# Patient Record
Sex: FEMALE | Race: BLACK OR AFRICAN AMERICAN | NOT HISPANIC OR LATINO
[De-identification: names, ages, dates, MRNs, and addresses within clinical notes are randomized per-mention and may not be internally consistent; named-entity substitution may affect disease eponyms.]

---

## 2017-01-05 ENCOUNTER — APPOINTMENT (OUTPATIENT)
Dept: OBGYN | Facility: HOSPITAL | Age: 27
End: 2017-01-05

## 2017-01-05 ENCOUNTER — OUTPATIENT (OUTPATIENT)
Dept: OUTPATIENT SERVICES | Facility: HOSPITAL | Age: 27
LOS: 1 days | End: 2017-01-05

## 2017-01-05 VITALS
HEIGHT: 63 IN | SYSTOLIC BLOOD PRESSURE: 100 MMHG | WEIGHT: 212.4 LBS | DIASTOLIC BLOOD PRESSURE: 82 MMHG | BODY MASS INDEX: 37.63 KG/M2

## 2017-01-05 DIAGNOSIS — O03.9 COMPLETE OR UNSPECIFIED SPONTANEOUS ABORTION WITHOUT COMPLICATION: Chronic | ICD-10-CM

## 2017-01-05 DIAGNOSIS — Z98.89 OTHER SPECIFIED POSTPROCEDURAL STATES: Chronic | ICD-10-CM

## 2017-01-05 DIAGNOSIS — Z34.83 ENCOUNTER FOR SUPERVISION OF OTHER NORMAL PREGNANCY, THIRD TRIMESTER: ICD-10-CM

## 2017-01-08 ENCOUNTER — OUTPATIENT (OUTPATIENT)
Dept: OUTPATIENT SERVICES | Facility: HOSPITAL | Age: 27
LOS: 1 days | End: 2017-01-08
Payer: MEDICAID

## 2017-01-08 DIAGNOSIS — Z98.89 OTHER SPECIFIED POSTPROCEDURAL STATES: Chronic | ICD-10-CM

## 2017-01-08 DIAGNOSIS — O03.9 COMPLETE OR UNSPECIFIED SPONTANEOUS ABORTION WITHOUT COMPLICATION: Chronic | ICD-10-CM

## 2017-01-08 DIAGNOSIS — O26.90 PREGNANCY RELATED CONDITIONS, UNSPECIFIED, UNSPECIFIED TRIMESTER: ICD-10-CM

## 2017-01-08 PROCEDURE — 99214 OFFICE O/P EST MOD 30 MIN: CPT

## 2017-01-12 ENCOUNTER — APPOINTMENT (OUTPATIENT)
Dept: OBGYN | Facility: HOSPITAL | Age: 27
End: 2017-01-12

## 2017-01-12 ENCOUNTER — OUTPATIENT (OUTPATIENT)
Dept: OUTPATIENT SERVICES | Facility: HOSPITAL | Age: 27
LOS: 1 days | End: 2017-01-12

## 2017-01-12 VITALS
DIASTOLIC BLOOD PRESSURE: 80 MMHG | BODY MASS INDEX: 37.92 KG/M2 | WEIGHT: 214 LBS | SYSTOLIC BLOOD PRESSURE: 116 MMHG | HEIGHT: 63 IN

## 2017-01-12 DIAGNOSIS — Z98.89 OTHER SPECIFIED POSTPROCEDURAL STATES: Chronic | ICD-10-CM

## 2017-01-12 DIAGNOSIS — O03.9 COMPLETE OR UNSPECIFIED SPONTANEOUS ABORTION WITHOUT COMPLICATION: Chronic | ICD-10-CM

## 2017-01-13 DIAGNOSIS — Z34.83 ENCOUNTER FOR SUPERVISION OF OTHER NORMAL PREGNANCY, THIRD TRIMESTER: ICD-10-CM

## 2017-01-19 ENCOUNTER — APPOINTMENT (OUTPATIENT)
Dept: OBGYN | Facility: HOSPITAL | Age: 27
End: 2017-01-19

## 2017-01-19 ENCOUNTER — OUTPATIENT (OUTPATIENT)
Dept: OUTPATIENT SERVICES | Facility: HOSPITAL | Age: 27
LOS: 1 days | End: 2017-01-19

## 2017-01-19 VITALS — WEIGHT: 210 LBS | SYSTOLIC BLOOD PRESSURE: 120 MMHG | DIASTOLIC BLOOD PRESSURE: 79 MMHG | BODY MASS INDEX: 37.2 KG/M2

## 2017-01-19 DIAGNOSIS — Z98.89 OTHER SPECIFIED POSTPROCEDURAL STATES: Chronic | ICD-10-CM

## 2017-01-19 DIAGNOSIS — Z34.83 ENCOUNTER FOR SUPERVISION OF OTHER NORMAL PREGNANCY, THIRD TRIMESTER: ICD-10-CM

## 2017-01-19 DIAGNOSIS — O03.9 COMPLETE OR UNSPECIFIED SPONTANEOUS ABORTION WITHOUT COMPLICATION: Chronic | ICD-10-CM

## 2017-01-24 ENCOUNTER — INPATIENT (INPATIENT)
Facility: HOSPITAL | Age: 27
LOS: 2 days | Discharge: HOME | End: 2017-01-27
Attending: OBSTETRICS & GYNECOLOGY | Admitting: OBSTETRICS & GYNECOLOGY
Payer: MEDICAID

## 2017-01-24 ENCOUNTER — APPOINTMENT (OUTPATIENT)
Dept: OBGYN | Facility: HOSPITAL | Age: 27
End: 2017-01-24

## 2017-01-24 ENCOUNTER — OTHER (OUTPATIENT)
Age: 27
End: 2017-01-24

## 2017-01-24 ENCOUNTER — OUTPATIENT (OUTPATIENT)
Dept: OUTPATIENT SERVICES | Facility: HOSPITAL | Age: 27
LOS: 1 days | End: 2017-01-24

## 2017-01-24 ENCOUNTER — RESULT REVIEW (OUTPATIENT)
Age: 27
End: 2017-01-24

## 2017-01-24 VITALS — SYSTOLIC BLOOD PRESSURE: 136 MMHG | DIASTOLIC BLOOD PRESSURE: 94 MMHG | BODY MASS INDEX: 38.26 KG/M2 | WEIGHT: 216 LBS

## 2017-01-24 VITALS — WEIGHT: 213.85 LBS | HEIGHT: 63 IN

## 2017-01-24 DIAGNOSIS — Z98.89 OTHER SPECIFIED POSTPROCEDURAL STATES: Chronic | ICD-10-CM

## 2017-01-24 DIAGNOSIS — O03.9 COMPLETE OR UNSPECIFIED SPONTANEOUS ABORTION WITHOUT COMPLICATION: Chronic | ICD-10-CM

## 2017-01-24 DIAGNOSIS — O26.90 PREGNANCY RELATED CONDITIONS, UNSPECIFIED, UNSPECIFIED TRIMESTER: ICD-10-CM

## 2017-01-24 LAB
ALBUMIN SERPL ELPH-MCNC: 3.6 G/DL — SIGNIFICANT CHANGE UP (ref 3.3–5)
ALP SERPL-CCNC: 235 U/L — HIGH (ref 40–120)
ALT FLD-CCNC: 10 U/L — SIGNIFICANT CHANGE UP (ref 4–33)
APPEARANCE UR: SIGNIFICANT CHANGE UP
APTT BLD: 31 SEC — SIGNIFICANT CHANGE UP (ref 27.5–37.4)
AST SERPL-CCNC: 16 U/L — SIGNIFICANT CHANGE UP (ref 4–32)
BACTERIA # UR AUTO: SIGNIFICANT CHANGE UP
BASOPHILS # BLD AUTO: 0.05 K/UL — SIGNIFICANT CHANGE UP (ref 0–0.2)
BASOPHILS NFR BLD AUTO: 0.9 % — SIGNIFICANT CHANGE UP (ref 0–2)
BILIRUB SERPL-MCNC: 0.2 MG/DL — SIGNIFICANT CHANGE UP (ref 0.2–1.2)
BILIRUB UR-MCNC: NEGATIVE — SIGNIFICANT CHANGE UP
BLD GP AB SCN SERPL QL: NEGATIVE — SIGNIFICANT CHANGE UP
BLOOD UR QL VISUAL: HIGH
BUN SERPL-MCNC: 9 MG/DL — SIGNIFICANT CHANGE UP (ref 7–23)
CALCIUM SERPL-MCNC: 9.3 MG/DL — SIGNIFICANT CHANGE UP (ref 8.4–10.5)
CHLORIDE SERPL-SCNC: 101 MMOL/L — SIGNIFICANT CHANGE UP (ref 98–107)
CO2 SERPL-SCNC: 21 MMOL/L — LOW (ref 22–31)
COLOR SPEC: SIGNIFICANT CHANGE UP
CREAT SERPL-MCNC: 0.45 MG/DL — LOW (ref 0.5–1.3)
EOSINOPHIL # BLD AUTO: 0.16 K/UL — SIGNIFICANT CHANGE UP (ref 0–0.5)
EOSINOPHIL NFR BLD AUTO: 3 % — SIGNIFICANT CHANGE UP (ref 0–6)
FIBRINOGEN PPP-MCNC: 662 MG/DL — HIGH (ref 255–510)
GLUCOSE SERPL-MCNC: 84 MG/DL — SIGNIFICANT CHANGE UP (ref 70–99)
GLUCOSE UR-MCNC: NEGATIVE — SIGNIFICANT CHANGE UP
HCT VFR BLD CALC: 36.9 % — SIGNIFICANT CHANGE UP (ref 34.5–45)
HGB BLD-MCNC: 12 G/DL — SIGNIFICANT CHANGE UP (ref 11.5–15.5)
IMM GRANULOCYTES NFR BLD AUTO: 0.6 % — SIGNIFICANT CHANGE UP (ref 0–1.5)
INR BLD: 0.95 — SIGNIFICANT CHANGE UP (ref 0.87–1.18)
KETONES UR-MCNC: NEGATIVE — SIGNIFICANT CHANGE UP
LDH SERPL L TO P-CCNC: 185 U/L — SIGNIFICANT CHANGE UP (ref 135–225)
LEUKOCYTE ESTERASE UR-ACNC: HIGH
LYMPHOCYTES # BLD AUTO: 1.84 K/UL — SIGNIFICANT CHANGE UP (ref 1–3.3)
LYMPHOCYTES # BLD AUTO: 34.1 % — SIGNIFICANT CHANGE UP (ref 13–44)
MAGNESIUM SERPL-MCNC: 4.4 MG/DL — HIGH (ref 1.6–2.6)
MCHC RBC-ENTMCNC: 27.7 PG — SIGNIFICANT CHANGE UP (ref 27–34)
MCHC RBC-ENTMCNC: 32.5 % — SIGNIFICANT CHANGE UP (ref 32–36)
MCV RBC AUTO: 85.2 FL — SIGNIFICANT CHANGE UP (ref 80–100)
MONOCYTES # BLD AUTO: 0.83 K/UL — SIGNIFICANT CHANGE UP (ref 0–0.9)
MONOCYTES NFR BLD AUTO: 15.4 % — HIGH (ref 2–14)
MUCOUS THREADS # UR AUTO: SIGNIFICANT CHANGE UP
NEUTROPHILS # BLD AUTO: 2.49 K/UL — SIGNIFICANT CHANGE UP (ref 1.8–7.4)
NEUTROPHILS NFR BLD AUTO: 46 % — SIGNIFICANT CHANGE UP (ref 43–77)
NITRITE UR-MCNC: NEGATIVE — SIGNIFICANT CHANGE UP
PH UR: 7 — SIGNIFICANT CHANGE UP (ref 4.6–8)
PLATELET # BLD AUTO: 239 K/UL — SIGNIFICANT CHANGE UP (ref 150–400)
PMV BLD: 12.4 FL — SIGNIFICANT CHANGE UP (ref 7–13)
POTASSIUM SERPL-MCNC: 4.5 MMOL/L — SIGNIFICANT CHANGE UP (ref 3.5–5.3)
POTASSIUM SERPL-SCNC: 4.5 MMOL/L — SIGNIFICANT CHANGE UP (ref 3.5–5.3)
PROT SERPL-MCNC: 7.2 G/DL — SIGNIFICANT CHANGE UP (ref 6–8.3)
PROT UR-MCNC: 10 — SIGNIFICANT CHANGE UP
PROTHROM AB SERPL-ACNC: 10.8 SEC — SIGNIFICANT CHANGE UP (ref 10–13.1)
RBC # BLD: 4.33 M/UL — SIGNIFICANT CHANGE UP (ref 3.8–5.2)
RBC # FLD: 14.8 % — HIGH (ref 10.3–14.5)
RBC CASTS # UR COMP ASSIST: SIGNIFICANT CHANGE UP (ref 0–?)
RH IG SCN BLD-IMP: POSITIVE — SIGNIFICANT CHANGE UP
SODIUM SERPL-SCNC: 139 MMOL/L — SIGNIFICANT CHANGE UP (ref 135–145)
SP GR SPEC: 1.02 — SIGNIFICANT CHANGE UP (ref 1–1.03)
SQUAMOUS # UR AUTO: SIGNIFICANT CHANGE UP
T PALLIDUM AB TITR SER: NEGATIVE — SIGNIFICANT CHANGE UP
URATE SERPL-MCNC: 5.5 MG/DL — SIGNIFICANT CHANGE UP (ref 2.5–7)
UROBILINOGEN FLD QL: NORMAL E.U. — SIGNIFICANT CHANGE UP (ref 0.1–0.2)
WBC # BLD: 5.4 K/UL — SIGNIFICANT CHANGE UP (ref 3.8–10.5)
WBC # FLD AUTO: 5.4 K/UL — SIGNIFICANT CHANGE UP (ref 3.8–10.5)
WBC UR QL: SIGNIFICANT CHANGE UP (ref 0–?)

## 2017-01-24 RX ORDER — OXYTOCIN 10 UNIT/ML
2 VIAL (ML) INJECTION
Qty: 30 | Refills: 0 | Status: DISCONTINUED | OUTPATIENT
Start: 2017-01-24 | End: 2017-01-25

## 2017-01-24 RX ORDER — SODIUM CHLORIDE 9 MG/ML
1000 INJECTION, SOLUTION INTRAVENOUS ONCE
Qty: 0 | Refills: 0 | Status: COMPLETED | OUTPATIENT
Start: 2017-01-24 | End: 2017-01-24

## 2017-01-24 RX ORDER — OXYTOCIN 10 UNIT/ML
333.33 VIAL (ML) INJECTION
Qty: 20 | Refills: 0 | Status: DISCONTINUED | OUTPATIENT
Start: 2017-01-24 | End: 2017-01-25

## 2017-01-24 RX ORDER — PENICILLIN G POTASSIUM 5000000 [IU]/1
5 POWDER, FOR SOLUTION INTRAMUSCULAR; INTRAPLEURAL; INTRATHECAL; INTRAVENOUS ONCE
Qty: 0 | Refills: 0 | Status: DISCONTINUED | OUTPATIENT
Start: 2017-01-24 | End: 2017-01-24

## 2017-01-24 RX ORDER — LABETALOL HCL 100 MG
20 TABLET ORAL ONCE
Qty: 0 | Refills: 0 | Status: COMPLETED | OUTPATIENT
Start: 2017-01-24 | End: 2017-01-24

## 2017-01-24 RX ORDER — SODIUM CHLORIDE 9 MG/ML
1000 INJECTION, SOLUTION INTRAVENOUS
Qty: 0 | Refills: 0 | Status: DISCONTINUED | OUTPATIENT
Start: 2017-01-24 | End: 2017-01-25

## 2017-01-24 RX ORDER — ACETAMINOPHEN 500 MG
975 TABLET ORAL EVERY 6 HOURS
Qty: 0 | Refills: 0 | Status: DISCONTINUED | OUTPATIENT
Start: 2017-01-24 | End: 2017-01-27

## 2017-01-24 RX ORDER — MAGNESIUM SULFATE 500 MG/ML
4 VIAL (ML) INJECTION ONCE
Qty: 0 | Refills: 0 | Status: COMPLETED | OUTPATIENT
Start: 2017-01-24 | End: 2017-01-24

## 2017-01-24 RX ORDER — ACETAMINOPHEN 500 MG
975 TABLET ORAL ONCE
Qty: 0 | Refills: 0 | Status: COMPLETED | OUTPATIENT
Start: 2017-01-24 | End: 2017-01-24

## 2017-01-24 RX ORDER — MAGNESIUM SULFATE 500 MG/ML
2 VIAL (ML) INJECTION
Qty: 40 | Refills: 0 | Status: DISCONTINUED | OUTPATIENT
Start: 2017-01-24 | End: 2017-01-27

## 2017-01-24 RX ORDER — PENICILLIN G POTASSIUM 5000000 [IU]/1
2.5 POWDER, FOR SOLUTION INTRAMUSCULAR; INTRAPLEURAL; INTRATHECAL; INTRAVENOUS EVERY 4 HOURS
Qty: 0 | Refills: 0 | Status: DISCONTINUED | OUTPATIENT
Start: 2017-01-24 | End: 2017-01-24

## 2017-01-24 RX ORDER — PENICILLIN G POTASSIUM 5000000 [IU]/1
POWDER, FOR SOLUTION INTRAMUSCULAR; INTRAPLEURAL; INTRATHECAL; INTRAVENOUS
Qty: 0 | Refills: 0 | Status: DISCONTINUED | OUTPATIENT
Start: 2017-01-24 | End: 2017-01-24

## 2017-01-24 RX ADMIN — Medication 300 GRAM(S): at 11:44

## 2017-01-24 RX ADMIN — SODIUM CHLORIDE 2000 MILLILITER(S): 9 INJECTION, SOLUTION INTRAVENOUS at 14:40

## 2017-01-24 RX ADMIN — SODIUM CHLORIDE 50 MILLILITER(S): 9 INJECTION, SOLUTION INTRAVENOUS at 19:40

## 2017-01-24 RX ADMIN — Medication 2 MILLIUNIT(S)/MIN: at 17:26

## 2017-01-24 RX ADMIN — SODIUM CHLORIDE 50 MILLILITER(S): 9 INJECTION, SOLUTION INTRAVENOUS at 11:00

## 2017-01-24 RX ADMIN — Medication 50 GM/HR: at 19:10

## 2017-01-24 RX ADMIN — Medication 975 MILLIGRAM(S): at 11:12

## 2017-01-24 RX ADMIN — Medication 20 MILLIGRAM(S): at 10:45

## 2017-01-24 RX ADMIN — Medication 975 MILLIGRAM(S): at 11:47

## 2017-01-24 RX ADMIN — Medication 50 GM/HR: at 12:05

## 2017-01-24 RX ADMIN — Medication 975 MILLIGRAM(S): at 17:25

## 2017-01-25 DIAGNOSIS — Z34.82 ENCOUNTER FOR SUPERVISION OF OTHER NORMAL PREGNANCY, SECOND TRIMESTER: ICD-10-CM

## 2017-01-25 DIAGNOSIS — I10 ESSENTIAL (PRIMARY) HYPERTENSION: ICD-10-CM

## 2017-01-25 LAB
MAGNESIUM SERPL-MCNC: 5.6 MG/DL — HIGH (ref 1.6–2.6)
MAGNESIUM SERPL-MCNC: 5.7 MG/DL — HIGH (ref 1.6–2.6)
MAGNESIUM SERPL-MCNC: 5.9 MG/DL — HIGH (ref 1.6–2.6)
MAGNESIUM SERPL-MCNC: 6.1 MG/DL — HIGH (ref 1.6–2.6)

## 2017-01-25 PROCEDURE — 59409 OBSTETRICAL CARE: CPT | Mod: U9

## 2017-01-25 PROCEDURE — 88307 TISSUE EXAM BY PATHOLOGIST: CPT | Mod: 26

## 2017-01-25 RX ORDER — DIPHENHYDRAMINE HCL 50 MG
25 CAPSULE ORAL EVERY 6 HOURS
Qty: 0 | Refills: 0 | Status: DISCONTINUED | OUTPATIENT
Start: 2017-01-25 | End: 2017-01-27

## 2017-01-25 RX ORDER — GLYCERIN ADULT
1 SUPPOSITORY, RECTAL RECTAL AT BEDTIME
Qty: 0 | Refills: 0 | Status: DISCONTINUED | OUTPATIENT
Start: 2017-01-25 | End: 2017-01-27

## 2017-01-25 RX ORDER — SODIUM CHLORIDE 9 MG/ML
3 INJECTION INTRAMUSCULAR; INTRAVENOUS; SUBCUTANEOUS EVERY 8 HOURS
Qty: 0 | Refills: 0 | Status: DISCONTINUED | OUTPATIENT
Start: 2017-01-25 | End: 2017-01-27

## 2017-01-25 RX ORDER — PRAMOXINE HYDROCHLORIDE 150 MG/15G
1 AEROSOL, FOAM RECTAL EVERY 4 HOURS
Qty: 0 | Refills: 0 | Status: DISCONTINUED | OUTPATIENT
Start: 2017-01-25 | End: 2017-01-25

## 2017-01-25 RX ORDER — MAGNESIUM HYDROXIDE 400 MG/1
30 TABLET, CHEWABLE ORAL
Qty: 0 | Refills: 0 | Status: DISCONTINUED | OUTPATIENT
Start: 2017-01-25 | End: 2017-01-27

## 2017-01-25 RX ORDER — OXYTOCIN 10 UNIT/ML
41.67 VIAL (ML) INJECTION
Qty: 20 | Refills: 0 | Status: DISCONTINUED | OUTPATIENT
Start: 2017-01-25 | End: 2017-01-25

## 2017-01-25 RX ORDER — OXYCODONE HYDROCHLORIDE 5 MG/1
5 TABLET ORAL
Qty: 0 | Refills: 0 | Status: DISCONTINUED | OUTPATIENT
Start: 2017-01-25 | End: 2017-01-27

## 2017-01-25 RX ORDER — IBUPROFEN 200 MG
600 TABLET ORAL EVERY 6 HOURS
Qty: 0 | Refills: 0 | Status: DISCONTINUED | OUTPATIENT
Start: 2017-01-25 | End: 2017-01-27

## 2017-01-25 RX ORDER — SODIUM CHLORIDE 9 MG/ML
1000 INJECTION, SOLUTION INTRAVENOUS
Qty: 0 | Refills: 0 | Status: DISCONTINUED | OUTPATIENT
Start: 2017-01-25 | End: 2017-01-26

## 2017-01-25 RX ORDER — KETOROLAC TROMETHAMINE 30 MG/ML
30 SYRINGE (ML) INJECTION ONCE
Qty: 0 | Refills: 0 | Status: DISCONTINUED | OUTPATIENT
Start: 2017-01-25 | End: 2017-01-25

## 2017-01-25 RX ORDER — OXYTOCIN 10 UNIT/ML
16.67 VIAL (ML) INJECTION
Qty: 20 | Refills: 0 | Status: DISCONTINUED | OUTPATIENT
Start: 2017-01-25 | End: 2017-01-27

## 2017-01-25 RX ORDER — MAGNESIUM SULFATE 500 MG/ML
2 VIAL (ML) INJECTION
Qty: 40 | Refills: 0 | Status: DISCONTINUED | OUTPATIENT
Start: 2017-01-25 | End: 2017-01-26

## 2017-01-25 RX ORDER — DIBUCAINE 1 %
1 OINTMENT (GRAM) RECTAL EVERY 4 HOURS
Qty: 0 | Refills: 0 | Status: DISCONTINUED | OUTPATIENT
Start: 2017-01-25 | End: 2017-01-25

## 2017-01-25 RX ORDER — OXYTOCIN 10 UNIT/ML
333.33 VIAL (ML) INJECTION
Qty: 20 | Refills: 0 | Status: DISCONTINUED | OUTPATIENT
Start: 2017-01-25 | End: 2017-01-27

## 2017-01-25 RX ORDER — SODIUM CHLORIDE 9 MG/ML
3 INJECTION INTRAMUSCULAR; INTRAVENOUS; SUBCUTANEOUS EVERY 8 HOURS
Qty: 0 | Refills: 0 | Status: DISCONTINUED | OUTPATIENT
Start: 2017-01-25 | End: 2017-01-25

## 2017-01-25 RX ORDER — AER TRAVELER 0.5 G/1
1 SOLUTION RECTAL; TOPICAL EVERY 4 HOURS
Qty: 0 | Refills: 0 | Status: DISCONTINUED | OUTPATIENT
Start: 2017-01-25 | End: 2017-01-27

## 2017-01-25 RX ORDER — OXYCODONE HYDROCHLORIDE 5 MG/1
5 TABLET ORAL EVERY 4 HOURS
Qty: 0 | Refills: 0 | Status: DISCONTINUED | OUTPATIENT
Start: 2017-01-25 | End: 2017-01-27

## 2017-01-25 RX ORDER — AER TRAVELER 0.5 G/1
1 SOLUTION RECTAL; TOPICAL EVERY 4 HOURS
Qty: 0 | Refills: 0 | Status: DISCONTINUED | OUTPATIENT
Start: 2017-01-25 | End: 2017-01-25

## 2017-01-25 RX ORDER — DOCUSATE SODIUM 100 MG
100 CAPSULE ORAL
Qty: 0 | Refills: 0 | Status: DISCONTINUED | OUTPATIENT
Start: 2017-01-25 | End: 2017-01-27

## 2017-01-25 RX ORDER — OXYTOCIN 10 UNIT/ML
333.33 VIAL (ML) INJECTION
Qty: 20 | Refills: 0 | Status: COMPLETED | OUTPATIENT
Start: 2017-01-25

## 2017-01-25 RX ORDER — SIMETHICONE 80 MG/1
80 TABLET, CHEWABLE ORAL EVERY 6 HOURS
Qty: 0 | Refills: 0 | Status: DISCONTINUED | OUTPATIENT
Start: 2017-01-25 | End: 2017-01-27

## 2017-01-25 RX ORDER — HYDROCORTISONE 1 %
1 OINTMENT (GRAM) TOPICAL EVERY 4 HOURS
Qty: 0 | Refills: 0 | Status: DISCONTINUED | OUTPATIENT
Start: 2017-01-25 | End: 2017-01-27

## 2017-01-25 RX ORDER — LANOLIN
1 OINTMENT (GRAM) TOPICAL EVERY 6 HOURS
Qty: 0 | Refills: 0 | Status: DISCONTINUED | OUTPATIENT
Start: 2017-01-25 | End: 2017-01-27

## 2017-01-25 RX ORDER — PRAMOXINE HYDROCHLORIDE 150 MG/15G
1 AEROSOL, FOAM RECTAL EVERY 4 HOURS
Qty: 0 | Refills: 0 | Status: DISCONTINUED | OUTPATIENT
Start: 2017-01-25 | End: 2017-01-27

## 2017-01-25 RX ORDER — HYDROCORTISONE 1 %
1 OINTMENT (GRAM) TOPICAL EVERY 4 HOURS
Qty: 0 | Refills: 0 | Status: DISCONTINUED | OUTPATIENT
Start: 2017-01-25 | End: 2017-01-25

## 2017-01-25 RX ORDER — DIBUCAINE 1 %
1 OINTMENT (GRAM) RECTAL EVERY 4 HOURS
Qty: 0 | Refills: 0 | Status: DISCONTINUED | OUTPATIENT
Start: 2017-01-25 | End: 2017-01-27

## 2017-01-25 RX ADMIN — Medication 100 MILLIGRAM(S): at 15:18

## 2017-01-25 RX ADMIN — OXYCODONE HYDROCHLORIDE 5 MILLIGRAM(S): 5 TABLET ORAL at 19:52

## 2017-01-25 RX ADMIN — Medication 50 GM/HR: at 20:37

## 2017-01-25 RX ADMIN — OXYCODONE HYDROCHLORIDE 5 MILLIGRAM(S): 5 TABLET ORAL at 12:21

## 2017-01-25 RX ADMIN — OXYCODONE HYDROCHLORIDE 5 MILLIGRAM(S): 5 TABLET ORAL at 16:21

## 2017-01-25 RX ADMIN — Medication 600 MILLIGRAM(S): at 20:38

## 2017-01-25 RX ADMIN — Medication 600 MILLIGRAM(S): at 15:18

## 2017-01-25 RX ADMIN — OXYCODONE HYDROCHLORIDE 5 MILLIGRAM(S): 5 TABLET ORAL at 07:49

## 2017-01-25 RX ADMIN — Medication 1 TABLET(S): at 12:21

## 2017-01-25 RX ADMIN — SODIUM CHLORIDE 3 MILLILITER(S): 9 INJECTION INTRAMUSCULAR; INTRAVENOUS; SUBCUTANEOUS at 09:51

## 2017-01-25 RX ADMIN — Medication 125 MILLIUNIT(S)/MIN: at 06:15

## 2017-01-25 RX ADMIN — Medication 50 GM/HR: at 09:47

## 2017-01-25 RX ADMIN — OXYCODONE HYDROCHLORIDE 5 MILLIGRAM(S): 5 TABLET ORAL at 08:14

## 2017-01-25 RX ADMIN — Medication 600 MILLIGRAM(S): at 15:51

## 2017-01-25 RX ADMIN — OXYCODONE HYDROCHLORIDE 5 MILLIGRAM(S): 5 TABLET ORAL at 20:38

## 2017-01-25 RX ADMIN — Medication 50 MILLIUNIT(S)/MIN: at 08:24

## 2017-01-25 RX ADMIN — SODIUM CHLORIDE 50 MILLILITER(S): 9 INJECTION, SOLUTION INTRAVENOUS at 20:36

## 2017-01-25 RX ADMIN — Medication 1000 MILLIUNIT(S)/MIN: at 06:14

## 2017-01-25 RX ADMIN — Medication 50 GM/HR: at 07:15

## 2017-01-25 RX ADMIN — Medication 600 MILLIGRAM(S): at 19:52

## 2017-01-25 RX ADMIN — Medication 30 MILLIGRAM(S): at 07:50

## 2017-01-25 RX ADMIN — Medication 30 MILLIGRAM(S): at 07:20

## 2017-01-25 RX ADMIN — OXYCODONE HYDROCHLORIDE 5 MILLIGRAM(S): 5 TABLET ORAL at 15:51

## 2017-01-25 RX ADMIN — Medication 975 MILLIGRAM(S): at 10:28

## 2017-01-25 RX ADMIN — OXYCODONE HYDROCHLORIDE 5 MILLIGRAM(S): 5 TABLET ORAL at 12:51

## 2017-01-25 RX ADMIN — SODIUM CHLORIDE 3 MILLILITER(S): 9 INJECTION INTRAMUSCULAR; INTRAVENOUS; SUBCUTANEOUS at 20:39

## 2017-01-25 NOTE — PROVIDER CONTACT NOTE (OTHER) - ACTION/TREATMENT ORDERED:
MD at bedside, assessed pt., felt for uterus, and saw vaginal bleeding on pad. No new orders given, will continue to monitor status.

## 2017-01-26 ENCOUNTER — TRANSCRIPTION ENCOUNTER (OUTPATIENT)
Age: 27
End: 2017-01-26

## 2017-01-26 LAB — MAGNESIUM SERPL-MCNC: 5.7 MG/DL — HIGH (ref 1.6–2.6)

## 2017-01-26 RX ORDER — ACETAMINOPHEN 500 MG
3 TABLET ORAL
Qty: 0 | Refills: 0 | COMMUNITY
Start: 2017-01-26

## 2017-01-26 RX ORDER — IBUPROFEN 200 MG
1 TABLET ORAL
Qty: 0 | Refills: 0 | COMMUNITY
Start: 2017-01-26

## 2017-01-26 RX ADMIN — Medication 1 TABLET(S): at 13:50

## 2017-01-26 RX ADMIN — Medication 600 MILLIGRAM(S): at 13:49

## 2017-01-26 RX ADMIN — OXYCODONE HYDROCHLORIDE 5 MILLIGRAM(S): 5 TABLET ORAL at 03:19

## 2017-01-26 RX ADMIN — Medication 100 MILLIGRAM(S): at 01:07

## 2017-01-26 RX ADMIN — SODIUM CHLORIDE 3 MILLILITER(S): 9 INJECTION INTRAMUSCULAR; INTRAVENOUS; SUBCUTANEOUS at 06:17

## 2017-01-26 RX ADMIN — Medication 600 MILLIGRAM(S): at 01:06

## 2017-01-26 RX ADMIN — Medication 600 MILLIGRAM(S): at 02:00

## 2017-01-26 RX ADMIN — OXYCODONE HYDROCHLORIDE 5 MILLIGRAM(S): 5 TABLET ORAL at 04:00

## 2017-01-26 RX ADMIN — Medication 975 MILLIGRAM(S): at 01:07

## 2017-01-26 RX ADMIN — Medication 975 MILLIGRAM(S): at 13:49

## 2017-01-26 RX ADMIN — SODIUM CHLORIDE 3 MILLILITER(S): 9 INJECTION INTRAMUSCULAR; INTRAVENOUS; SUBCUTANEOUS at 13:51

## 2017-01-26 RX ADMIN — Medication 600 MILLIGRAM(S): at 14:40

## 2017-01-26 NOTE — DISCHARGE NOTE OB - PLAN OF CARE
Recovery Regular diet.  Resume normal activity as tolerated. Follow up at [facility] in 6 weeks.  No heavy lifting, driving, or strenuous activity for 6 weeks.  Nothing per vagina such as tampons, intercourse, douches or tub baths for 6 weeks or until you see your doctor.  Call your doctor with any signs and symptoms of infection such as fever, chills, nausea or vomiting.  Call your doctor if you're unable to tolerate food, increase in vaginal bleeding or have difficulty urinating.  Call your doctor if you have pain that is not relieved by your prescribed medications.  Notify your doctor with any other concerns.

## 2017-01-26 NOTE — DISCHARGE NOTE OB - CARE PROVIDERS DIRECT ADDRESSES
,DirectAddress_Unknown,[email or web address]

## 2017-01-26 NOTE — LACTATION INITIAL EVALUATION - INTERVENTION OUTCOME
verbalizes understanding/good return demonstration/demonstrates understanding of teaching/Worked with mother on deeper latch and position and  latched well and swallowing noted , mother educated on feeding cues and to breastfeed 8- 12 times in 24 hours and discussed breastfeeding log and assist prn, Rn aware of plan Worked with mother on deeper latch and position and  latched well and swallowing noted , mother educated on feeding cues and to breastfeed 8- 12 times in 24 hours and discussed breastfeeding log and assist prn, Rn aware of plan/demonstrates understanding of teaching/verbalizes understanding/good return demonstration/needs met

## 2017-01-26 NOTE — DISCHARGE NOTE OB - CARE PLAN
Principal Discharge DX:	Vaginal delivery  Goal:	Recovery  Instructions for follow-up, activity and diet:	Regular diet.  Resume normal activity as tolerated. Follow up at [facility] in 6 weeks.  No heavy lifting, driving, or strenuous activity for 6 weeks.  Nothing per vagina such as tampons, intercourse, douches or tub baths for 6 weeks or until you see your doctor.  Call your doctor with any signs and symptoms of infection such as fever, chills, nausea or vomiting.  Call your doctor if you're unable to tolerate food, increase in vaginal bleeding or have difficulty urinating.  Call your doctor if you have pain that is not relieved by your prescribed medications.  Notify your doctor with any other concerns.

## 2017-01-26 NOTE — DISCHARGE NOTE OB - HOSPITAL COURSE
Patient underwent uncomplicated  of male infant.    HCT 36.9  Course complicated by severe PEC by BP with normal HELLP labs requiring Labetolol 20 mg IVP x 1 and Magnesium 24 hour infusion postpartum.  Patient desired male circumcision which was performed.  Patient counseled on contraceptive options and desired 6 wk Paraguard PP.    The patient met all appropriate post partum milestones.  The patient was able to void, tolerated a regular diet, and ambulated on her own.  The patient was discharged on PPD#2 afebrile, with stable vital signs, and appropriate pain control.

## 2017-01-26 NOTE — DISCHARGE NOTE OB - PATIENT PORTAL LINK FT
“You can access the [facility] Patient Portal, offered by [facility], by registering with the following website: [email or web address]”

## 2017-01-26 NOTE — DISCHARGE NOTE OB - MEDICATION SUMMARY - MEDICATIONS TO TAKE
I will START or STAY ON the medications listed below when I get home from the hospital:    acetaminophen 325 mg oral tablet  -- 3 tab(s) by mouth every 6 hours  -- Indication: For Pain    ibuprofen 600 mg oral tablet  -- 1 tab(s) by mouth every 6 hours  -- Indication: For Pain    Prenatabs Rx oral tablet  -- 1 tab(s) by mouth once a day  -- Indication: For Breastfeeding

## 2017-01-27 ENCOUNTER — OTHER (OUTPATIENT)
Age: 27
End: 2017-01-27

## 2017-01-27 VITALS
TEMPERATURE: 99 F | HEART RATE: 108 BPM | OXYGEN SATURATION: 100 % | SYSTOLIC BLOOD PRESSURE: 129 MMHG | DIASTOLIC BLOOD PRESSURE: 81 MMHG | RESPIRATION RATE: 18 BRPM

## 2017-01-27 RX ADMIN — Medication 975 MILLIGRAM(S): at 09:11

## 2017-01-27 RX ADMIN — Medication 600 MILLIGRAM(S): at 09:11

## 2017-01-27 RX ADMIN — SODIUM CHLORIDE 3 MILLILITER(S): 9 INJECTION INTRAMUSCULAR; INTRAVENOUS; SUBCUTANEOUS at 00:06

## 2017-01-27 RX ADMIN — SODIUM CHLORIDE 3 MILLILITER(S): 9 INJECTION INTRAMUSCULAR; INTRAVENOUS; SUBCUTANEOUS at 05:44

## 2017-01-27 RX ADMIN — Medication 600 MILLIGRAM(S): at 10:10

## 2017-01-27 NOTE — PROVIDER CONTACT NOTE (OTHER) - ASSESSMENT
VS WNL, /81, , complains of perineum pain/soreness, fundus is 1 below, vaginal bleeding is moderate.

## 2017-01-31 ENCOUNTER — APPOINTMENT (OUTPATIENT)
Dept: OBGYN | Facility: HOSPITAL | Age: 27
End: 2017-01-31

## 2017-02-01 LAB — SURGICAL PATHOLOGY STUDY: SIGNIFICANT CHANGE UP

## 2017-03-03 ENCOUNTER — LABORATORY RESULT (OUTPATIENT)
Age: 27
End: 2017-03-03

## 2017-03-03 ENCOUNTER — OUTPATIENT (OUTPATIENT)
Dept: OUTPATIENT SERVICES | Facility: HOSPITAL | Age: 27
LOS: 1 days | End: 2017-03-03

## 2017-03-03 ENCOUNTER — APPOINTMENT (OUTPATIENT)
Dept: OBGYN | Facility: HOSPITAL | Age: 27
End: 2017-03-03

## 2017-03-03 VITALS
BODY MASS INDEX: 33.13 KG/M2 | HEART RATE: 75 BPM | DIASTOLIC BLOOD PRESSURE: 64 MMHG | WEIGHT: 187 LBS | SYSTOLIC BLOOD PRESSURE: 128 MMHG | HEIGHT: 63 IN

## 2017-03-03 DIAGNOSIS — Z98.89 OTHER SPECIFIED POSTPROCEDURAL STATES: Chronic | ICD-10-CM

## 2017-03-03 DIAGNOSIS — O03.9 COMPLETE OR UNSPECIFIED SPONTANEOUS ABORTION WITHOUT COMPLICATION: Chronic | ICD-10-CM

## 2017-03-04 LAB
C TRACH RRNA SPEC QL NAA+PROBE: SIGNIFICANT CHANGE UP
N GONORRHOEA RRNA SPEC QL NAA+PROBE: SIGNIFICANT CHANGE UP
SPECIMEN SOURCE: SIGNIFICANT CHANGE UP

## 2017-03-22 ENCOUNTER — APPOINTMENT (OUTPATIENT)
Dept: OBGYN | Facility: HOSPITAL | Age: 27
End: 2017-03-22

## 2017-03-29 ENCOUNTER — LABORATORY RESULT (OUTPATIENT)
Age: 27
End: 2017-03-29

## 2017-03-29 LAB
BASOPHILS # BLD AUTO: 0.1 K/UL — SIGNIFICANT CHANGE UP (ref 0–0.2)
BASOPHILS NFR BLD AUTO: 1.9 % — SIGNIFICANT CHANGE UP (ref 0–2)
EOSINOPHIL # BLD AUTO: 0.27 K/UL — SIGNIFICANT CHANGE UP (ref 0–0.5)
EOSINOPHIL NFR BLD AUTO: 5.1 % — SIGNIFICANT CHANGE UP (ref 0–6)
HCT VFR BLD CALC: 34.3 % — LOW (ref 34.5–45)
HGB BLD-MCNC: 10.6 G/DL — LOW (ref 11.5–15.5)
IMM GRANULOCYTES NFR BLD AUTO: 0.2 % — SIGNIFICANT CHANGE UP (ref 0–1.5)
LYMPHOCYTES # BLD AUTO: 2.04 K/UL — SIGNIFICANT CHANGE UP (ref 1–3.3)
LYMPHOCYTES # BLD AUTO: 38.3 % — SIGNIFICANT CHANGE UP (ref 13–44)
MCHC RBC-ENTMCNC: 25.8 PG — LOW (ref 27–34)
MCHC RBC-ENTMCNC: 30.9 % — LOW (ref 32–36)
MCV RBC AUTO: 83.5 FL — SIGNIFICANT CHANGE UP (ref 80–100)
MONOCYTES # BLD AUTO: 0.47 K/UL — SIGNIFICANT CHANGE UP (ref 0–0.9)
MONOCYTES NFR BLD AUTO: 8.8 % — SIGNIFICANT CHANGE UP (ref 2–14)
NEUTROPHILS # BLD AUTO: 2.43 K/UL — SIGNIFICANT CHANGE UP (ref 1.8–7.4)
NEUTROPHILS NFR BLD AUTO: 45.7 % — SIGNIFICANT CHANGE UP (ref 43–77)
PLATELET # BLD AUTO: 353 K/UL — SIGNIFICANT CHANGE UP (ref 150–400)
PMV BLD: 11.8 FL — SIGNIFICANT CHANGE UP (ref 7–13)
RBC # BLD: 4.11 M/UL — SIGNIFICANT CHANGE UP (ref 3.8–5.2)
RBC # FLD: 15.4 % — HIGH (ref 10.3–14.5)
WBC # BLD: 5.32 K/UL — SIGNIFICANT CHANGE UP (ref 3.8–10.5)
WBC # FLD AUTO: 5.32 K/UL — SIGNIFICANT CHANGE UP (ref 3.8–10.5)

## 2017-11-07 ENCOUNTER — TRANSCRIPTION ENCOUNTER (OUTPATIENT)
Age: 27
End: 2017-11-07

## 2018-04-14 ENCOUNTER — TRANSCRIPTION ENCOUNTER (OUTPATIENT)
Age: 28
End: 2018-04-14

## 2018-07-02 ENCOUNTER — EMERGENCY (EMERGENCY)
Facility: HOSPITAL | Age: 28
LOS: 1 days | Discharge: HOME | End: 2018-07-02
Attending: EMERGENCY MEDICINE | Admitting: EMERGENCY MEDICINE
Payer: COMMERCIAL

## 2018-07-02 VITALS
RESPIRATION RATE: 16 BRPM | SYSTOLIC BLOOD PRESSURE: 137 MMHG | OXYGEN SATURATION: 100 % | DIASTOLIC BLOOD PRESSURE: 94 MMHG | HEART RATE: 78 BPM | TEMPERATURE: 98 F

## 2018-07-02 VITALS
DIASTOLIC BLOOD PRESSURE: 78 MMHG | HEART RATE: 76 BPM | TEMPERATURE: 99 F | SYSTOLIC BLOOD PRESSURE: 128 MMHG | OXYGEN SATURATION: 100 % | RESPIRATION RATE: 16 BRPM

## 2018-07-02 DIAGNOSIS — Z98.89 OTHER SPECIFIED POSTPROCEDURAL STATES: Chronic | ICD-10-CM

## 2018-07-02 DIAGNOSIS — O03.9 COMPLETE OR UNSPECIFIED SPONTANEOUS ABORTION WITHOUT COMPLICATION: Chronic | ICD-10-CM

## 2018-07-02 LAB
ALBUMIN SERPL ELPH-MCNC: 4.2 G/DL — SIGNIFICANT CHANGE UP (ref 3.3–5)
ALP SERPL-CCNC: 76 U/L — SIGNIFICANT CHANGE UP (ref 40–120)
ALT FLD-CCNC: 8 U/L — SIGNIFICANT CHANGE UP (ref 4–33)
APPEARANCE UR: SIGNIFICANT CHANGE UP
AST SERPL-CCNC: 20 U/L — SIGNIFICANT CHANGE UP (ref 4–32)
BACTERIA # UR AUTO: SIGNIFICANT CHANGE UP
BASOPHILS # BLD AUTO: 0.1 K/UL — SIGNIFICANT CHANGE UP (ref 0–0.2)
BASOPHILS NFR BLD AUTO: 1.4 % — SIGNIFICANT CHANGE UP (ref 0–2)
BILIRUB SERPL-MCNC: < 0.2 MG/DL — LOW (ref 0.2–1.2)
BILIRUB UR-MCNC: NEGATIVE — SIGNIFICANT CHANGE UP
BLD GP AB SCN SERPL QL: NEGATIVE — SIGNIFICANT CHANGE UP
BLOOD UR QL VISUAL: HIGH
BUN SERPL-MCNC: 12 MG/DL — SIGNIFICANT CHANGE UP (ref 7–23)
CALCIUM SERPL-MCNC: 9.4 MG/DL — SIGNIFICANT CHANGE UP (ref 8.4–10.5)
CHLORIDE SERPL-SCNC: 99 MMOL/L — SIGNIFICANT CHANGE UP (ref 98–107)
CO2 SERPL-SCNC: 24 MMOL/L — SIGNIFICANT CHANGE UP (ref 22–31)
COLOR SPEC: YELLOW — SIGNIFICANT CHANGE UP
CREAT SERPL-MCNC: 0.66 MG/DL — SIGNIFICANT CHANGE UP (ref 0.5–1.3)
EOSINOPHIL # BLD AUTO: 0.19 K/UL — SIGNIFICANT CHANGE UP (ref 0–0.5)
EOSINOPHIL NFR BLD AUTO: 2.7 % — SIGNIFICANT CHANGE UP (ref 0–6)
GLUCOSE SERPL-MCNC: 89 MG/DL — SIGNIFICANT CHANGE UP (ref 70–99)
GLUCOSE UR-MCNC: NEGATIVE — SIGNIFICANT CHANGE UP
HCG SERPL-ACNC: SIGNIFICANT CHANGE UP MIU/ML
HCT VFR BLD CALC: 34 % — LOW (ref 34.5–45)
HGB BLD-MCNC: 10.7 G/DL — LOW (ref 11.5–15.5)
IMM GRANULOCYTES # BLD AUTO: 0.02 # — SIGNIFICANT CHANGE UP
IMM GRANULOCYTES NFR BLD AUTO: 0.3 % — SIGNIFICANT CHANGE UP (ref 0–1.5)
KETONES UR-MCNC: NEGATIVE — SIGNIFICANT CHANGE UP
LEUKOCYTE ESTERASE UR-ACNC: HIGH
LYMPHOCYTES # BLD AUTO: 2.34 K/UL — SIGNIFICANT CHANGE UP (ref 1–3.3)
LYMPHOCYTES # BLD AUTO: 33.2 % — SIGNIFICANT CHANGE UP (ref 13–44)
MCHC RBC-ENTMCNC: 27.1 PG — SIGNIFICANT CHANGE UP (ref 27–34)
MCHC RBC-ENTMCNC: 31.5 % — LOW (ref 32–36)
MCV RBC AUTO: 86.1 FL — SIGNIFICANT CHANGE UP (ref 80–100)
MONOCYTES # BLD AUTO: 0.7 K/UL — SIGNIFICANT CHANGE UP (ref 0–0.9)
MONOCYTES NFR BLD AUTO: 9.9 % — SIGNIFICANT CHANGE UP (ref 2–14)
NEUTROPHILS # BLD AUTO: 3.7 K/UL — SIGNIFICANT CHANGE UP (ref 1.8–7.4)
NEUTROPHILS NFR BLD AUTO: 52.5 % — SIGNIFICANT CHANGE UP (ref 43–77)
NITRITE UR-MCNC: NEGATIVE — SIGNIFICANT CHANGE UP
NRBC # FLD: 0 — SIGNIFICANT CHANGE UP
PH UR: 6.5 — SIGNIFICANT CHANGE UP (ref 4.6–8)
PLATELET # BLD AUTO: 337 K/UL — SIGNIFICANT CHANGE UP (ref 150–400)
PMV BLD: 11.5 FL — SIGNIFICANT CHANGE UP (ref 7–13)
POTASSIUM SERPL-MCNC: 4.5 MMOL/L — SIGNIFICANT CHANGE UP (ref 3.5–5.3)
POTASSIUM SERPL-SCNC: 4.5 MMOL/L — SIGNIFICANT CHANGE UP (ref 3.5–5.3)
PROT SERPL-MCNC: 7.1 G/DL — SIGNIFICANT CHANGE UP (ref 6–8.3)
PROT UR-MCNC: 100 MG/DL — HIGH
RBC # BLD: 3.95 M/UL — SIGNIFICANT CHANGE UP (ref 3.8–5.2)
RBC # FLD: 15.2 % — HIGH (ref 10.3–14.5)
RBC CASTS # UR COMP ASSIST: >50 — HIGH (ref 0–?)
RH IG SCN BLD-IMP: POSITIVE — SIGNIFICANT CHANGE UP
SODIUM SERPL-SCNC: 135 MMOL/L — SIGNIFICANT CHANGE UP (ref 135–145)
SP GR SPEC: 1.02 — SIGNIFICANT CHANGE UP (ref 1–1.04)
SQUAMOUS # UR AUTO: SIGNIFICANT CHANGE UP
UROBILINOGEN FLD QL: NORMAL MG/DL — SIGNIFICANT CHANGE UP
WBC # BLD: 7.05 K/UL — SIGNIFICANT CHANGE UP (ref 3.8–10.5)
WBC # FLD AUTO: 7.05 K/UL — SIGNIFICANT CHANGE UP (ref 3.8–10.5)
WBC CLUMPS #/AREA URNS HPF: PRESENT — HIGH (ref 0–?)
WBC UR QL: SIGNIFICANT CHANGE UP (ref 0–?)

## 2018-07-02 PROCEDURE — 76830 TRANSVAGINAL US NON-OB: CPT | Mod: 26

## 2018-07-02 PROCEDURE — 99284 EMERGENCY DEPT VISIT MOD MDM: CPT | Mod: 25

## 2018-07-02 RX ORDER — CEPHALEXIN 500 MG
1 CAPSULE ORAL
Qty: 14 | Refills: 0 | OUTPATIENT
Start: 2018-07-02 | End: 2018-07-08

## 2018-07-02 RX ORDER — CEPHALEXIN 500 MG
500 CAPSULE ORAL EVERY 12 HOURS
Qty: 0 | Refills: 0 | Status: DISCONTINUED | OUTPATIENT
Start: 2018-07-02 | End: 2018-07-06

## 2018-07-02 RX ORDER — ACETAMINOPHEN 500 MG
650 TABLET ORAL ONCE
Qty: 0 | Refills: 0 | Status: COMPLETED | OUTPATIENT
Start: 2018-07-02 | End: 2018-07-02

## 2018-07-02 RX ADMIN — Medication 650 MILLIGRAM(S): at 07:41

## 2018-07-02 RX ADMIN — Medication 500 MILLIGRAM(S): at 07:41

## 2018-07-02 NOTE — ED PROVIDER NOTE - CARE PLAN
Principal Discharge DX:	Threatened  in first trimester Principal Discharge DX:	UTI (urinary tract infection)  Assessment and plan of treatment:	Drink plenty of fluids - stay hydrated. Take Keflex 500mg twice daily for 7 days. Follow up with your OBGYN within 1 week for further evaluation. Return to the Emergency Department for any new, worsening or concerning symptoms.

## 2018-07-02 NOTE — ED PROVIDER NOTE - PLAN OF CARE
Drink plenty of fluids - stay hydrated. Take Keflex 500mg twice daily for 7 days. Follow up with your OBGYN within 1 week for further evaluation. Return to the Emergency Department for any new, worsening or concerning symptoms.

## 2018-07-02 NOTE — ED PROVIDER NOTE - OBJECTIVE STATEMENT
27y F  EGA 5w5d by LMP  now p/w mid lower abd pain assoc w/dysuria/frequency, w/ blood on the TP.  No OB care this pregnancy, OB apt scheduled for later today.

## 2018-07-02 NOTE — ED ADULT TRIAGE NOTE - CHIEF COMPLAINT QUOTE
5 wks preg, complaining of pelvic pain, vaginal bleeding and discomfort starting yesterday.  She notice some blood in the urine and a clot when she wiped.  Experienced nausea and vomiting yesterday.  No fever or chills.  LMP: May 23

## 2018-07-02 NOTE — ED ADULT NURSE NOTE - OBJECTIVE STATEMENT
Patient endorsing vaginal spotting in urine and when she wipes today at 300 am accompanied with dysuria. Endorses recent +home pregnancy test. LMP  . Denies any fever, chills. Odor or discharge. Urine appears cloudy. IV placed. MD Dubin aware patient requesting pain medication. Report endorsed to ELLEN maria.

## 2018-07-02 NOTE — ED PROVIDER NOTE - PROGRESS NOTE DETAILS
ROBBI Austin: pt was signed out to me by the night team. Pt NAD, VSS - discussed the results of the labs/imaging. Will dc pt with abx. Pt stable for dc.

## 2018-07-03 LAB — SPECIMEN SOURCE: SIGNIFICANT CHANGE UP

## 2018-07-04 LAB
-  AMIKACIN: SIGNIFICANT CHANGE UP
-  AMPICILLIN/SULBACTAM: SIGNIFICANT CHANGE UP
-  AMPICILLIN: SIGNIFICANT CHANGE UP
-  AZTREONAM: SIGNIFICANT CHANGE UP
-  CEFAZOLIN: SIGNIFICANT CHANGE UP
-  CEFEPIME: SIGNIFICANT CHANGE UP
-  CEFOXITIN: SIGNIFICANT CHANGE UP
-  CEFTAZIDIME: SIGNIFICANT CHANGE UP
-  CEFTRIAXONE: SIGNIFICANT CHANGE UP
-  CIPROFLOXACIN: SIGNIFICANT CHANGE UP
-  ERTAPENEM: SIGNIFICANT CHANGE UP
-  GENTAMICIN: SIGNIFICANT CHANGE UP
-  IMIPENEM: SIGNIFICANT CHANGE UP
-  LEVOFLOXACIN: SIGNIFICANT CHANGE UP
-  MEROPENEM: SIGNIFICANT CHANGE UP
-  NITROFURANTOIN: SIGNIFICANT CHANGE UP
-  PIPERACILLIN/TAZOBACTAM: SIGNIFICANT CHANGE UP
-  TIGECYCLINE: SIGNIFICANT CHANGE UP
-  TOBRAMYCIN: SIGNIFICANT CHANGE UP
-  TRIMETHOPRIM/SULFAMETHOXAZOLE: SIGNIFICANT CHANGE UP
BACTERIA UR CULT: SIGNIFICANT CHANGE UP
METHOD TYPE: SIGNIFICANT CHANGE UP
ORGANISM # SPEC MICROSCOPIC CNT: SIGNIFICANT CHANGE UP

## 2018-07-05 NOTE — ED POST DISCHARGE NOTE - RESULT SUMMARY
UCX: Klebsiella pneumonia > 100,000.  Pt discharged on Keflex which is sensitive.  Appropriate care in ED.  No call back necessary.

## 2018-08-24 ENCOUNTER — APPOINTMENT (OUTPATIENT)
Dept: ANTEPARTUM | Facility: CLINIC | Age: 28
End: 2018-08-24
Payer: COMMERCIAL

## 2018-08-24 ENCOUNTER — ASOB RESULT (OUTPATIENT)
Age: 28
End: 2018-08-24

## 2018-08-24 PROCEDURE — 76801 OB US < 14 WKS SINGLE FETUS: CPT

## 2018-08-24 PROCEDURE — 36416 COLLJ CAPILLARY BLOOD SPEC: CPT

## 2018-08-24 PROCEDURE — 76813 OB US NUCHAL MEAS 1 GEST: CPT

## 2018-10-10 ENCOUNTER — ASOB RESULT (OUTPATIENT)
Age: 28
End: 2018-10-10

## 2018-10-10 ENCOUNTER — APPOINTMENT (OUTPATIENT)
Dept: ANTEPARTUM | Facility: CLINIC | Age: 28
End: 2018-10-10
Payer: COMMERCIAL

## 2018-10-10 PROCEDURE — 76811 OB US DETAILED SNGL FETUS: CPT

## 2018-11-10 ENCOUNTER — APPOINTMENT (OUTPATIENT)
Dept: MATERNAL FETAL MEDICINE | Facility: CLINIC | Age: 28
End: 2018-11-10

## 2018-11-14 ENCOUNTER — APPOINTMENT (OUTPATIENT)
Dept: ANTEPARTUM | Facility: CLINIC | Age: 28
End: 2018-11-14

## 2018-12-03 ENCOUNTER — OUTPATIENT (OUTPATIENT)
Dept: OUTPATIENT SERVICES | Facility: HOSPITAL | Age: 28
LOS: 1 days | End: 2018-12-03
Payer: COMMERCIAL

## 2018-12-03 DIAGNOSIS — O03.9 COMPLETE OR UNSPECIFIED SPONTANEOUS ABORTION WITHOUT COMPLICATION: Chronic | ICD-10-CM

## 2018-12-03 DIAGNOSIS — Z98.89 OTHER SPECIFIED POSTPROCEDURAL STATES: Chronic | ICD-10-CM

## 2018-12-03 DIAGNOSIS — O26.899 OTHER SPECIFIED PREGNANCY RELATED CONDITIONS, UNSPECIFIED TRIMESTER: ICD-10-CM

## 2018-12-03 DIAGNOSIS — Z3A.00 WEEKS OF GESTATION OF PREGNANCY NOT SPECIFIED: ICD-10-CM

## 2018-12-03 LAB
ALBUMIN SERPL ELPH-MCNC: 3.8 G/DL — SIGNIFICANT CHANGE UP (ref 3.3–5)
ALP SERPL-CCNC: 76 U/L — SIGNIFICANT CHANGE UP (ref 40–120)
ALT FLD-CCNC: 8 U/L — SIGNIFICANT CHANGE UP (ref 4–33)
APPEARANCE UR: SIGNIFICANT CHANGE UP
APTT BLD: 30.5 SEC — SIGNIFICANT CHANGE UP (ref 27.5–36.3)
AST SERPL-CCNC: 14 U/L — SIGNIFICANT CHANGE UP (ref 4–32)
BACTERIA # UR AUTO: NEGATIVE — SIGNIFICANT CHANGE UP
BASOPHILS # BLD AUTO: 0.1 K/UL — SIGNIFICANT CHANGE UP (ref 0–0.2)
BASOPHILS NFR BLD AUTO: 1.2 % — SIGNIFICANT CHANGE UP (ref 0–2)
BILIRUB SERPL-MCNC: 0.2 MG/DL — SIGNIFICANT CHANGE UP (ref 0.2–1.2)
BILIRUB UR-MCNC: NEGATIVE — SIGNIFICANT CHANGE UP
BLOOD UR QL VISUAL: SIGNIFICANT CHANGE UP
BUN SERPL-MCNC: 9 MG/DL — SIGNIFICANT CHANGE UP (ref 7–23)
CALCIUM SERPL-MCNC: 9.4 MG/DL — SIGNIFICANT CHANGE UP (ref 8.4–10.5)
CHLORIDE SERPL-SCNC: 102 MMOL/L — SIGNIFICANT CHANGE UP (ref 98–107)
CO2 SERPL-SCNC: 21 MMOL/L — LOW (ref 22–31)
COLOR SPEC: YELLOW — SIGNIFICANT CHANGE UP
CREAT ?TM UR-MCNC: 158.5 MG/DL — SIGNIFICANT CHANGE UP
CREAT SERPL-MCNC: 0.5 MG/DL — SIGNIFICANT CHANGE UP (ref 0.5–1.3)
EOSINOPHIL # BLD AUTO: 0.2 K/UL — SIGNIFICANT CHANGE UP (ref 0–0.5)
EOSINOPHIL NFR BLD AUTO: 2.4 % — SIGNIFICANT CHANGE UP (ref 0–6)
FIBRINOGEN PPP-MCNC: 650.5 MG/DL — HIGH (ref 350–510)
GLUCOSE SERPL-MCNC: 84 MG/DL — SIGNIFICANT CHANGE UP (ref 70–99)
GLUCOSE UR-MCNC: NEGATIVE — SIGNIFICANT CHANGE UP
HCT VFR BLD CALC: 36.2 % — SIGNIFICANT CHANGE UP (ref 34.5–45)
HGB BLD-MCNC: 11.6 G/DL — SIGNIFICANT CHANGE UP (ref 11.5–15.5)
HYALINE CASTS # UR AUTO: SIGNIFICANT CHANGE UP
IMM GRANULOCYTES # BLD AUTO: 0.12 # — SIGNIFICANT CHANGE UP
IMM GRANULOCYTES NFR BLD AUTO: 1.4 % — SIGNIFICANT CHANGE UP (ref 0–1.5)
INR BLD: 1.07 — SIGNIFICANT CHANGE UP (ref 0.88–1.17)
KETONES UR-MCNC: HIGH
LDH SERPL L TO P-CCNC: 182 U/L — SIGNIFICANT CHANGE UP (ref 135–225)
LEUKOCYTE ESTERASE UR-ACNC: NEGATIVE — SIGNIFICANT CHANGE UP
LYMPHOCYTES # BLD AUTO: 2.09 K/UL — SIGNIFICANT CHANGE UP (ref 1–3.3)
LYMPHOCYTES # BLD AUTO: 25 % — SIGNIFICANT CHANGE UP (ref 13–44)
MCHC RBC-ENTMCNC: 28.4 PG — SIGNIFICANT CHANGE UP (ref 27–34)
MCHC RBC-ENTMCNC: 32 % — SIGNIFICANT CHANGE UP (ref 32–36)
MCV RBC AUTO: 88.7 FL — SIGNIFICANT CHANGE UP (ref 80–100)
MONOCYTES # BLD AUTO: 0.99 K/UL — HIGH (ref 0–0.9)
MONOCYTES NFR BLD AUTO: 11.8 % — SIGNIFICANT CHANGE UP (ref 2–14)
NEUTROPHILS # BLD AUTO: 4.86 K/UL — SIGNIFICANT CHANGE UP (ref 1.8–7.4)
NEUTROPHILS NFR BLD AUTO: 58.2 % — SIGNIFICANT CHANGE UP (ref 43–77)
NITRITE UR-MCNC: NEGATIVE — SIGNIFICANT CHANGE UP
NRBC # FLD: 0 — SIGNIFICANT CHANGE UP
PH UR: 7 — SIGNIFICANT CHANGE UP (ref 5–8)
PLATELET # BLD AUTO: 254 K/UL — SIGNIFICANT CHANGE UP (ref 150–400)
PMV BLD: 11.2 FL — SIGNIFICANT CHANGE UP (ref 7–13)
POTASSIUM SERPL-MCNC: 3.9 MMOL/L — SIGNIFICANT CHANGE UP (ref 3.5–5.3)
POTASSIUM SERPL-SCNC: 3.9 MMOL/L — SIGNIFICANT CHANGE UP (ref 3.5–5.3)
PROT SERPL-MCNC: 7.4 G/DL — SIGNIFICANT CHANGE UP (ref 6–8.3)
PROT UR-MCNC: 20 — SIGNIFICANT CHANGE UP
PROT UR-MCNC: 22.2 MG/DL — SIGNIFICANT CHANGE UP
PROTHROM AB SERPL-ACNC: 11.9 SEC — SIGNIFICANT CHANGE UP (ref 9.8–13.1)
RBC # BLD: 4.08 M/UL — SIGNIFICANT CHANGE UP (ref 3.8–5.2)
RBC # FLD: 13.5 % — SIGNIFICANT CHANGE UP (ref 10.3–14.5)
RBC CASTS # UR COMP ASSIST: SIGNIFICANT CHANGE UP (ref 0–?)
SODIUM SERPL-SCNC: 138 MMOL/L — SIGNIFICANT CHANGE UP (ref 135–145)
SP GR SPEC: 1.02 — SIGNIFICANT CHANGE UP (ref 1–1.04)
SQUAMOUS # UR AUTO: SIGNIFICANT CHANGE UP
URATE SERPL-MCNC: 5 MG/DL — SIGNIFICANT CHANGE UP (ref 2.5–7)
UROBILINOGEN FLD QL: NORMAL — SIGNIFICANT CHANGE UP
WBC # BLD: 8.36 K/UL — SIGNIFICANT CHANGE UP (ref 3.8–10.5)
WBC # FLD AUTO: 8.36 K/UL — SIGNIFICANT CHANGE UP (ref 3.8–10.5)
WBC UR QL: HIGH (ref 0–?)

## 2018-12-03 PROCEDURE — 59025 FETAL NON-STRESS TEST: CPT | Mod: 26

## 2018-12-03 PROCEDURE — 93010 ELECTROCARDIOGRAM REPORT: CPT

## 2018-12-03 RX ORDER — ACETAMINOPHEN 500 MG
650 TABLET ORAL ONCE
Qty: 0 | Refills: 0 | Status: DISCONTINUED | OUTPATIENT
Start: 2018-12-03 | End: 2018-12-03

## 2018-12-03 RX ORDER — ACETAMINOPHEN 500 MG
650 TABLET ORAL ONCE
Qty: 0 | Refills: 0 | Status: COMPLETED | OUTPATIENT
Start: 2018-12-03 | End: 2018-12-03

## 2018-12-03 RX ADMIN — Medication 650 MILLIGRAM(S): at 21:37

## 2018-12-03 RX ADMIN — Medication 650 MILLIGRAM(S): at 22:28

## 2018-12-05 ENCOUNTER — ASOB RESULT (OUTPATIENT)
Age: 28
End: 2018-12-05

## 2018-12-05 ENCOUNTER — APPOINTMENT (OUTPATIENT)
Dept: ANTEPARTUM | Facility: CLINIC | Age: 28
End: 2018-12-05
Payer: COMMERCIAL

## 2018-12-05 PROCEDURE — 76816 OB US FOLLOW-UP PER FETUS: CPT

## 2018-12-05 PROCEDURE — 76819 FETAL BIOPHYS PROFIL W/O NST: CPT

## 2018-12-10 ENCOUNTER — TRANSCRIPTION ENCOUNTER (OUTPATIENT)
Age: 28
End: 2018-12-10

## 2019-01-02 ENCOUNTER — APPOINTMENT (OUTPATIENT)
Dept: ANTEPARTUM | Facility: CLINIC | Age: 29
End: 2019-01-02
Payer: COMMERCIAL

## 2019-01-02 ENCOUNTER — ASOB RESULT (OUTPATIENT)
Age: 29
End: 2019-01-02

## 2019-01-02 PROCEDURE — 76819 FETAL BIOPHYS PROFIL W/O NST: CPT

## 2019-01-02 PROCEDURE — 76816 OB US FOLLOW-UP PER FETUS: CPT

## 2019-01-30 ENCOUNTER — APPOINTMENT (OUTPATIENT)
Dept: ANTEPARTUM | Facility: CLINIC | Age: 29
End: 2019-01-30
Payer: COMMERCIAL

## 2019-01-30 ENCOUNTER — ASOB RESULT (OUTPATIENT)
Age: 29
End: 2019-01-30

## 2019-01-30 PROCEDURE — 76816 OB US FOLLOW-UP PER FETUS: CPT

## 2019-01-30 PROCEDURE — 76819 FETAL BIOPHYS PROFIL W/O NST: CPT

## 2019-02-10 ENCOUNTER — INPATIENT (INPATIENT)
Facility: HOSPITAL | Age: 29
LOS: 3 days | Discharge: HOME | End: 2019-02-14
Attending: OBSTETRICS & GYNECOLOGY | Admitting: OBSTETRICS & GYNECOLOGY

## 2019-02-10 DIAGNOSIS — O26.899 OTHER SPECIFIED PREGNANCY RELATED CONDITIONS, UNSPECIFIED TRIMESTER: ICD-10-CM

## 2019-02-10 DIAGNOSIS — O03.9 COMPLETE OR UNSPECIFIED SPONTANEOUS ABORTION WITHOUT COMPLICATION: Chronic | ICD-10-CM

## 2019-02-10 DIAGNOSIS — Z3A.00 WEEKS OF GESTATION OF PREGNANCY NOT SPECIFIED: ICD-10-CM

## 2019-02-10 DIAGNOSIS — Z98.89 OTHER SPECIFIED POSTPROCEDURAL STATES: Chronic | ICD-10-CM

## 2019-02-10 LAB
ALBUMIN SERPL ELPH-MCNC: 3.5 G/DL — SIGNIFICANT CHANGE UP (ref 3.3–5)
ALP SERPL-CCNC: 156 U/L — HIGH (ref 40–120)
ALT FLD-CCNC: 7 U/L — SIGNIFICANT CHANGE UP (ref 4–33)
ANION GAP SERPL CALC-SCNC: 14 MMO/L — SIGNIFICANT CHANGE UP (ref 7–14)
APPEARANCE UR: CLEAR — SIGNIFICANT CHANGE UP
APTT BLD: 30 SEC — SIGNIFICANT CHANGE UP (ref 27.5–36.3)
AST SERPL-CCNC: 15 U/L — SIGNIFICANT CHANGE UP (ref 4–32)
BACTERIA # UR AUTO: NEGATIVE — SIGNIFICANT CHANGE UP
BASOPHILS # BLD AUTO: 0.08 K/UL — SIGNIFICANT CHANGE UP (ref 0–0.2)
BASOPHILS NFR BLD AUTO: 1.1 % — SIGNIFICANT CHANGE UP (ref 0–2)
BILIRUB SERPL-MCNC: < 0.2 MG/DL — LOW (ref 0.2–1.2)
BILIRUB UR-MCNC: NEGATIVE — SIGNIFICANT CHANGE UP
BLOOD UR QL VISUAL: NEGATIVE — SIGNIFICANT CHANGE UP
BUN SERPL-MCNC: 8 MG/DL — SIGNIFICANT CHANGE UP (ref 7–23)
CALCIUM SERPL-MCNC: 9.3 MG/DL — SIGNIFICANT CHANGE UP (ref 8.4–10.5)
CHLORIDE SERPL-SCNC: 100 MMOL/L — SIGNIFICANT CHANGE UP (ref 98–107)
CO2 SERPL-SCNC: 22 MMOL/L — SIGNIFICANT CHANGE UP (ref 22–31)
COLOR SPEC: YELLOW — SIGNIFICANT CHANGE UP
CREAT ?TM UR-MCNC: 115.3 MG/DL — SIGNIFICANT CHANGE UP
CREAT SERPL-MCNC: 0.53 MG/DL — SIGNIFICANT CHANGE UP (ref 0.5–1.3)
EOSINOPHIL # BLD AUTO: 0.25 K/UL — SIGNIFICANT CHANGE UP (ref 0–0.5)
EOSINOPHIL NFR BLD AUTO: 3.5 % — SIGNIFICANT CHANGE UP (ref 0–6)
FIBRINOGEN PPP-MCNC: 679.6 MG/DL — HIGH (ref 350–510)
GLUCOSE SERPL-MCNC: 125 MG/DL — HIGH (ref 70–99)
GLUCOSE UR-MCNC: NEGATIVE — SIGNIFICANT CHANGE UP
HCT VFR BLD CALC: 34.4 % — LOW (ref 34.5–45)
HGB BLD-MCNC: 10.9 G/DL — LOW (ref 11.5–15.5)
HYALINE CASTS # UR AUTO: NEGATIVE — SIGNIFICANT CHANGE UP
IMM GRANULOCYTES NFR BLD AUTO: 1.2 % — SIGNIFICANT CHANGE UP (ref 0–1.5)
INR BLD: 0.99 — SIGNIFICANT CHANGE UP (ref 0.88–1.17)
KETONES UR-MCNC: NEGATIVE — SIGNIFICANT CHANGE UP
LDH SERPL L TO P-CCNC: 178 U/L — SIGNIFICANT CHANGE UP (ref 135–225)
LEUKOCYTE ESTERASE UR-ACNC: NEGATIVE — SIGNIFICANT CHANGE UP
LYMPHOCYTES # BLD AUTO: 2.26 K/UL — SIGNIFICANT CHANGE UP (ref 1–3.3)
LYMPHOCYTES # BLD AUTO: 31.2 % — SIGNIFICANT CHANGE UP (ref 13–44)
MCHC RBC-ENTMCNC: 27.4 PG — SIGNIFICANT CHANGE UP (ref 27–34)
MCHC RBC-ENTMCNC: 31.7 % — LOW (ref 32–36)
MCV RBC AUTO: 86.4 FL — SIGNIFICANT CHANGE UP (ref 80–100)
MONOCYTES # BLD AUTO: 1.06 K/UL — HIGH (ref 0–0.9)
MONOCYTES NFR BLD AUTO: 14.6 % — HIGH (ref 2–14)
NEUTROPHILS # BLD AUTO: 3.5 K/UL — SIGNIFICANT CHANGE UP (ref 1.8–7.4)
NEUTROPHILS NFR BLD AUTO: 48.4 % — SIGNIFICANT CHANGE UP (ref 43–77)
NITRITE UR-MCNC: NEGATIVE — SIGNIFICANT CHANGE UP
NRBC # FLD: 0 K/UL — LOW (ref 25–125)
PH UR: 7 — SIGNIFICANT CHANGE UP (ref 5–8)
PLATELET # BLD AUTO: 242 K/UL — SIGNIFICANT CHANGE UP (ref 150–400)
PMV BLD: 11.7 FL — SIGNIFICANT CHANGE UP (ref 7–13)
POTASSIUM SERPL-MCNC: 4.2 MMOL/L — SIGNIFICANT CHANGE UP (ref 3.5–5.3)
POTASSIUM SERPL-SCNC: 4.2 MMOL/L — SIGNIFICANT CHANGE UP (ref 3.5–5.3)
PROT SERPL-MCNC: 6.6 G/DL — SIGNIFICANT CHANGE UP (ref 6–8.3)
PROT UR-MCNC: 20 — SIGNIFICANT CHANGE UP
PROT UR-MCNC: 21.1 MG/DL — SIGNIFICANT CHANGE UP
PROTHROM AB SERPL-ACNC: 11 SEC — SIGNIFICANT CHANGE UP (ref 9.8–13.1)
RBC # BLD: 3.98 M/UL — SIGNIFICANT CHANGE UP (ref 3.8–5.2)
RBC # FLD: 14.9 % — HIGH (ref 10.3–14.5)
RBC CASTS # UR COMP ASSIST: SIGNIFICANT CHANGE UP (ref 0–?)
SODIUM SERPL-SCNC: 136 MMOL/L — SIGNIFICANT CHANGE UP (ref 135–145)
SP GR SPEC: 1.02 — SIGNIFICANT CHANGE UP (ref 1–1.04)
SQUAMOUS # UR AUTO: SIGNIFICANT CHANGE UP
URATE SERPL-MCNC: 4.9 MG/DL — SIGNIFICANT CHANGE UP (ref 2.5–7)
UROBILINOGEN FLD QL: NORMAL — SIGNIFICANT CHANGE UP
WBC # BLD: 7.24 K/UL — SIGNIFICANT CHANGE UP (ref 3.8–10.5)
WBC # FLD AUTO: 7.24 K/UL — SIGNIFICANT CHANGE UP (ref 3.8–10.5)
WBC UR QL: SIGNIFICANT CHANGE UP (ref 0–?)

## 2019-02-10 RX ORDER — SODIUM CHLORIDE 9 MG/ML
1000 INJECTION, SOLUTION INTRAVENOUS
Qty: 0 | Refills: 0 | Status: DISCONTINUED | OUTPATIENT
Start: 2019-02-10 | End: 2019-02-11

## 2019-02-10 RX ORDER — LABETALOL HCL 100 MG
200 TABLET ORAL
Qty: 0 | Refills: 0 | Status: DISCONTINUED | OUTPATIENT
Start: 2019-02-10 | End: 2019-02-11

## 2019-02-10 RX ADMIN — Medication 200 MILLIGRAM(S): at 22:45

## 2019-02-11 VITALS — WEIGHT: 218.26 LBS | HEIGHT: 63 IN

## 2019-02-11 DIAGNOSIS — Z04.9 ENCOUNTER FOR EXAMINATION AND OBSERVATION FOR UNSPECIFIED REASON: ICD-10-CM

## 2019-02-11 LAB
ALBUMIN SERPL ELPH-MCNC: 3.2 G/DL — LOW (ref 3.3–5)
ALP SERPL-CCNC: 138 U/L — HIGH (ref 40–120)
ALT FLD-CCNC: 9 U/L — SIGNIFICANT CHANGE UP (ref 4–33)
ANION GAP SERPL CALC-SCNC: 12 MMO/L — SIGNIFICANT CHANGE UP (ref 7–14)
APTT BLD: 28.8 SEC — SIGNIFICANT CHANGE UP (ref 27.5–36.3)
AST SERPL-CCNC: 16 U/L — SIGNIFICANT CHANGE UP (ref 4–32)
BASOPHILS # BLD AUTO: 0.06 K/UL — SIGNIFICANT CHANGE UP (ref 0–0.2)
BASOPHILS NFR BLD AUTO: 1 % — SIGNIFICANT CHANGE UP (ref 0–2)
BILIRUB SERPL-MCNC: < 0.2 MG/DL — LOW (ref 0.2–1.2)
BLD GP AB SCN SERPL QL: NEGATIVE — SIGNIFICANT CHANGE UP
BUN SERPL-MCNC: 6 MG/DL — LOW (ref 7–23)
CALCIUM SERPL-MCNC: 8.5 MG/DL — SIGNIFICANT CHANGE UP (ref 8.4–10.5)
CHLORIDE SERPL-SCNC: 103 MMOL/L — SIGNIFICANT CHANGE UP (ref 98–107)
CO2 SERPL-SCNC: 22 MMOL/L — SIGNIFICANT CHANGE UP (ref 22–31)
CREAT SERPL-MCNC: 0.47 MG/DL — LOW (ref 0.5–1.3)
EOSINOPHIL # BLD AUTO: 0.16 K/UL — SIGNIFICANT CHANGE UP (ref 0–0.5)
EOSINOPHIL NFR BLD AUTO: 2.6 % — SIGNIFICANT CHANGE UP (ref 0–6)
FIBRINOGEN PPP-MCNC: 694.9 MG/DL — HIGH (ref 350–510)
GLUCOSE SERPL-MCNC: 85 MG/DL — SIGNIFICANT CHANGE UP (ref 70–99)
HCT VFR BLD CALC: 33.3 % — LOW (ref 34.5–45)
HGB BLD-MCNC: 10.4 G/DL — LOW (ref 11.5–15.5)
IMM GRANULOCYTES NFR BLD AUTO: 1.1 % — SIGNIFICANT CHANGE UP (ref 0–1.5)
INR BLD: 0.97 — SIGNIFICANT CHANGE UP (ref 0.88–1.17)
LDH SERPL L TO P-CCNC: 162 U/L — SIGNIFICANT CHANGE UP (ref 135–225)
LYMPHOCYTES # BLD AUTO: 1.61 K/UL — SIGNIFICANT CHANGE UP (ref 1–3.3)
LYMPHOCYTES # BLD AUTO: 26 % — SIGNIFICANT CHANGE UP (ref 13–44)
MAGNESIUM SERPL-MCNC: 4.3 MG/DL — HIGH (ref 1.6–2.6)
MAGNESIUM SERPL-MCNC: 4.8 MG/DL — HIGH (ref 1.6–2.6)
MCHC RBC-ENTMCNC: 26.9 PG — LOW (ref 27–34)
MCHC RBC-ENTMCNC: 31.2 % — LOW (ref 32–36)
MCV RBC AUTO: 86 FL — SIGNIFICANT CHANGE UP (ref 80–100)
MONOCYTES # BLD AUTO: 0.88 K/UL — SIGNIFICANT CHANGE UP (ref 0–0.9)
MONOCYTES NFR BLD AUTO: 14.2 % — HIGH (ref 2–14)
NEUTROPHILS # BLD AUTO: 3.42 K/UL — SIGNIFICANT CHANGE UP (ref 1.8–7.4)
NEUTROPHILS NFR BLD AUTO: 55.1 % — SIGNIFICANT CHANGE UP (ref 43–77)
NRBC # FLD: 0 K/UL — LOW (ref 25–125)
PLATELET # BLD AUTO: 231 K/UL — SIGNIFICANT CHANGE UP (ref 150–400)
PMV BLD: 12 FL — SIGNIFICANT CHANGE UP (ref 7–13)
POTASSIUM SERPL-MCNC: 3.8 MMOL/L — SIGNIFICANT CHANGE UP (ref 3.5–5.3)
POTASSIUM SERPL-SCNC: 3.8 MMOL/L — SIGNIFICANT CHANGE UP (ref 3.5–5.3)
PROT SERPL-MCNC: 6.3 G/DL — SIGNIFICANT CHANGE UP (ref 6–8.3)
PROTHROM AB SERPL-ACNC: 10.8 SEC — SIGNIFICANT CHANGE UP (ref 9.8–13.1)
RBC # BLD: 3.87 M/UL — SIGNIFICANT CHANGE UP (ref 3.8–5.2)
RBC # FLD: 15.1 % — HIGH (ref 10.3–14.5)
RH IG SCN BLD-IMP: POSITIVE — SIGNIFICANT CHANGE UP
SODIUM SERPL-SCNC: 137 MMOL/L — SIGNIFICANT CHANGE UP (ref 135–145)
T PALLIDUM AB TITR SER: NEGATIVE — SIGNIFICANT CHANGE UP
URATE SERPL-MCNC: 5.7 MG/DL — SIGNIFICANT CHANGE UP (ref 2.5–7)
WBC # BLD: 6.2 K/UL — SIGNIFICANT CHANGE UP (ref 3.8–10.5)
WBC # FLD AUTO: 6.2 K/UL — SIGNIFICANT CHANGE UP (ref 3.8–10.5)

## 2019-02-11 RX ORDER — SODIUM CHLORIDE 9 MG/ML
1000 INJECTION, SOLUTION INTRAVENOUS
Qty: 0 | Refills: 0 | Status: DISCONTINUED | OUTPATIENT
Start: 2019-02-11 | End: 2019-02-12

## 2019-02-11 RX ORDER — MAGNESIUM SULFATE 500 MG/ML
2 VIAL (ML) INJECTION
Qty: 40 | Refills: 0 | Status: COMPLETED | OUTPATIENT
Start: 2019-02-11 | End: 2019-02-12

## 2019-02-11 RX ORDER — DIPHENHYDRAMINE HCL 50 MG
25 CAPSULE ORAL ONCE
Qty: 0 | Refills: 0 | Status: COMPLETED | OUTPATIENT
Start: 2019-02-11 | End: 2019-02-11

## 2019-02-11 RX ORDER — CITRIC ACID/SODIUM CITRATE 300-500 MG
15 SOLUTION, ORAL ORAL EVERY 4 HOURS
Qty: 0 | Refills: 0 | Status: DISCONTINUED | OUTPATIENT
Start: 2019-02-11 | End: 2019-02-12

## 2019-02-11 RX ORDER — OXYTOCIN 10 UNIT/ML
333.33 VIAL (ML) INJECTION
Qty: 20 | Refills: 0 | Status: COMPLETED | OUTPATIENT
Start: 2019-02-11

## 2019-02-11 RX ORDER — SODIUM CHLORIDE 9 MG/ML
1000 INJECTION, SOLUTION INTRAVENOUS ONCE
Qty: 0 | Refills: 0 | Status: COMPLETED | OUTPATIENT
Start: 2019-02-11 | End: 2019-02-12

## 2019-02-11 RX ORDER — ACETAMINOPHEN 500 MG
975 TABLET ORAL ONCE
Qty: 0 | Refills: 0 | Status: COMPLETED | OUTPATIENT
Start: 2019-02-11 | End: 2019-02-11

## 2019-02-11 RX ORDER — MAGNESIUM SULFATE 500 MG/ML
4 VIAL (ML) INJECTION ONCE
Qty: 0 | Refills: 0 | Status: COMPLETED | OUTPATIENT
Start: 2019-02-11 | End: 2019-02-11

## 2019-02-11 RX ORDER — METOCLOPRAMIDE HCL 10 MG
10 TABLET ORAL ONCE
Qty: 0 | Refills: 0 | Status: COMPLETED | OUTPATIENT
Start: 2019-02-11 | End: 2019-02-11

## 2019-02-11 RX ADMIN — Medication 10 MILLIGRAM(S): at 05:17

## 2019-02-11 RX ADMIN — SODIUM CHLORIDE 50 MILLILITER(S): 9 INJECTION, SOLUTION INTRAVENOUS at 06:00

## 2019-02-11 RX ADMIN — SODIUM CHLORIDE 125 MILLILITER(S): 9 INJECTION, SOLUTION INTRAVENOUS at 00:10

## 2019-02-11 RX ADMIN — Medication 15 MILLILITER(S): at 21:14

## 2019-02-11 RX ADMIN — Medication 25 MILLIGRAM(S): at 05:17

## 2019-02-11 RX ADMIN — Medication 50 GM/HR: at 08:04

## 2019-02-11 RX ADMIN — Medication 975 MILLIGRAM(S): at 03:30

## 2019-02-11 RX ADMIN — Medication 975 MILLIGRAM(S): at 02:32

## 2019-02-11 RX ADMIN — Medication 50 GM/HR: at 05:49

## 2019-02-11 RX ADMIN — Medication 300 GRAM(S): at 05:24

## 2019-02-12 LAB
ALBUMIN SERPL ELPH-MCNC: 3.3 G/DL — SIGNIFICANT CHANGE UP (ref 3.3–5)
ALBUMIN SERPL ELPH-MCNC: 3.6 G/DL — SIGNIFICANT CHANGE UP (ref 3.3–5)
ALP SERPL-CCNC: 150 U/L — HIGH (ref 40–120)
ALP SERPL-CCNC: 156 U/L — HIGH (ref 40–120)
ALT FLD-CCNC: 6 U/L — SIGNIFICANT CHANGE UP (ref 4–33)
ALT FLD-CCNC: 9 U/L — SIGNIFICANT CHANGE UP (ref 4–33)
ANION GAP SERPL CALC-SCNC: 15 MMO/L — HIGH (ref 7–14)
ANION GAP SERPL CALC-SCNC: 16 MMO/L — HIGH (ref 7–14)
APTT BLD: 25.9 SEC — LOW (ref 27.5–36.3)
APTT BLD: 30.1 SEC — SIGNIFICANT CHANGE UP (ref 27.5–36.3)
AST SERPL-CCNC: 17 U/L — SIGNIFICANT CHANGE UP (ref 4–32)
AST SERPL-CCNC: 18 U/L — SIGNIFICANT CHANGE UP (ref 4–32)
BASOPHILS # BLD AUTO: 0.06 K/UL — SIGNIFICANT CHANGE UP (ref 0–0.2)
BASOPHILS # BLD AUTO: 0.08 K/UL — SIGNIFICANT CHANGE UP (ref 0–0.2)
BASOPHILS NFR BLD AUTO: 0.9 % — SIGNIFICANT CHANGE UP (ref 0–2)
BASOPHILS NFR BLD AUTO: 1.4 % — SIGNIFICANT CHANGE UP (ref 0–2)
BILIRUB SERPL-MCNC: < 0.2 MG/DL — LOW (ref 0.2–1.2)
BILIRUB SERPL-MCNC: < 0.2 MG/DL — LOW (ref 0.2–1.2)
BUN SERPL-MCNC: 4 MG/DL — LOW (ref 7–23)
BUN SERPL-MCNC: 4 MG/DL — LOW (ref 7–23)
CALCIUM SERPL-MCNC: 8 MG/DL — LOW (ref 8.4–10.5)
CALCIUM SERPL-MCNC: 8.2 MG/DL — LOW (ref 8.4–10.5)
CHLORIDE SERPL-SCNC: 98 MMOL/L — SIGNIFICANT CHANGE UP (ref 98–107)
CHLORIDE SERPL-SCNC: 99 MMOL/L — SIGNIFICANT CHANGE UP (ref 98–107)
CO2 SERPL-SCNC: 21 MMOL/L — LOW (ref 22–31)
CO2 SERPL-SCNC: 24 MMOL/L — SIGNIFICANT CHANGE UP (ref 22–31)
CREAT SERPL-MCNC: 0.46 MG/DL — LOW (ref 0.5–1.3)
CREAT SERPL-MCNC: 0.56 MG/DL — SIGNIFICANT CHANGE UP (ref 0.5–1.3)
EOSINOPHIL # BLD AUTO: 0.2 K/UL — SIGNIFICANT CHANGE UP (ref 0–0.5)
EOSINOPHIL # BLD AUTO: 0.28 K/UL — SIGNIFICANT CHANGE UP (ref 0–0.5)
EOSINOPHIL NFR BLD AUTO: 3.4 % — SIGNIFICANT CHANGE UP (ref 0–6)
EOSINOPHIL NFR BLD AUTO: 4.3 % — SIGNIFICANT CHANGE UP (ref 0–6)
FIBRINOGEN PPP-MCNC: 754.5 MG/DL — HIGH (ref 350–510)
FIBRINOGEN PPP-MCNC: 776 MG/DL — HIGH (ref 350–510)
GLUCOSE SERPL-MCNC: 100 MG/DL — HIGH (ref 70–99)
GLUCOSE SERPL-MCNC: 87 MG/DL — SIGNIFICANT CHANGE UP (ref 70–99)
HCT VFR BLD CALC: 33.8 % — LOW (ref 34.5–45)
HCT VFR BLD CALC: 35.9 % — SIGNIFICANT CHANGE UP (ref 34.5–45)
HGB BLD-MCNC: 10.8 G/DL — LOW (ref 11.5–15.5)
HGB BLD-MCNC: 11.1 G/DL — LOW (ref 11.5–15.5)
IMM GRANULOCYTES NFR BLD AUTO: 0.7 % — SIGNIFICANT CHANGE UP (ref 0–1.5)
IMM GRANULOCYTES NFR BLD AUTO: 0.8 % — SIGNIFICANT CHANGE UP (ref 0–1.5)
INR BLD: 0.94 — SIGNIFICANT CHANGE UP (ref 0.88–1.17)
INR BLD: 0.97 — SIGNIFICANT CHANGE UP (ref 0.88–1.17)
LDH SERPL L TO P-CCNC: 182 U/L — SIGNIFICANT CHANGE UP (ref 135–225)
LDH SERPL L TO P-CCNC: 188 U/L — SIGNIFICANT CHANGE UP (ref 135–225)
LYMPHOCYTES # BLD AUTO: 1.24 K/UL — SIGNIFICANT CHANGE UP (ref 1–3.3)
LYMPHOCYTES # BLD AUTO: 1.5 K/UL — SIGNIFICANT CHANGE UP (ref 1–3.3)
LYMPHOCYTES # BLD AUTO: 20.9 % — SIGNIFICANT CHANGE UP (ref 13–44)
LYMPHOCYTES # BLD AUTO: 23.1 % — SIGNIFICANT CHANGE UP (ref 13–44)
M PROTEIN 24H MFR UR ELPH: 263 MG/24 HR — SIGNIFICANT CHANGE UP
MAGNESIUM SERPL-MCNC: 4.8 MG/DL — HIGH (ref 1.6–2.6)
MAGNESIUM SERPL-MCNC: 5.2 MG/DL — HIGH (ref 1.6–2.6)
MAGNESIUM SERPL-MCNC: 5.3 MG/DL — HIGH (ref 1.6–2.6)
MAGNESIUM SERPL-MCNC: 5.4 MG/DL — HIGH (ref 1.6–2.6)
MCHC RBC-ENTMCNC: 27.1 PG — SIGNIFICANT CHANGE UP (ref 27–34)
MCHC RBC-ENTMCNC: 27.1 PG — SIGNIFICANT CHANGE UP (ref 27–34)
MCHC RBC-ENTMCNC: 30.9 % — LOW (ref 32–36)
MCHC RBC-ENTMCNC: 32 % — SIGNIFICANT CHANGE UP (ref 32–36)
MCV RBC AUTO: 84.9 FL — SIGNIFICANT CHANGE UP (ref 80–100)
MCV RBC AUTO: 87.6 FL — SIGNIFICANT CHANGE UP (ref 80–100)
MONOCYTES # BLD AUTO: 0.65 K/UL — SIGNIFICANT CHANGE UP (ref 0–0.9)
MONOCYTES # BLD AUTO: 0.88 K/UL — SIGNIFICANT CHANGE UP (ref 0–0.9)
MONOCYTES NFR BLD AUTO: 11 % — SIGNIFICANT CHANGE UP (ref 2–14)
MONOCYTES NFR BLD AUTO: 13.6 % — SIGNIFICANT CHANGE UP (ref 2–14)
NEUTROPHILS # BLD AUTO: 3.71 K/UL — SIGNIFICANT CHANGE UP (ref 1.8–7.4)
NEUTROPHILS # BLD AUTO: 3.72 K/UL — SIGNIFICANT CHANGE UP (ref 1.8–7.4)
NEUTROPHILS NFR BLD AUTO: 57.3 % — SIGNIFICANT CHANGE UP (ref 43–77)
NEUTROPHILS NFR BLD AUTO: 62.6 % — SIGNIFICANT CHANGE UP (ref 43–77)
NRBC # FLD: 0 K/UL — LOW (ref 25–125)
NRBC # FLD: 0 K/UL — LOW (ref 25–125)
PLATELET # BLD AUTO: 232 K/UL — SIGNIFICANT CHANGE UP (ref 150–400)
PLATELET # BLD AUTO: 274 K/UL — SIGNIFICANT CHANGE UP (ref 150–400)
PMV BLD: 12 FL — SIGNIFICANT CHANGE UP (ref 7–13)
PMV BLD: 12.2 FL — SIGNIFICANT CHANGE UP (ref 7–13)
POTASSIUM SERPL-MCNC: 4 MMOL/L — SIGNIFICANT CHANGE UP (ref 3.5–5.3)
POTASSIUM SERPL-MCNC: 4.4 MMOL/L — SIGNIFICANT CHANGE UP (ref 3.5–5.3)
POTASSIUM SERPL-SCNC: 4 MMOL/L — SIGNIFICANT CHANGE UP (ref 3.5–5.3)
POTASSIUM SERPL-SCNC: 4.4 MMOL/L — SIGNIFICANT CHANGE UP (ref 3.5–5.3)
PROT SERPL-MCNC: 6.5 G/DL — SIGNIFICANT CHANGE UP (ref 6–8.3)
PROT SERPL-MCNC: 6.7 G/DL — SIGNIFICANT CHANGE UP (ref 6–8.3)
PROTHROM AB SERPL-ACNC: 10.7 SEC — SIGNIFICANT CHANGE UP (ref 9.8–13.1)
PROTHROM AB SERPL-ACNC: 10.7 SEC — SIGNIFICANT CHANGE UP (ref 9.8–13.1)
RBC # BLD: 3.98 M/UL — SIGNIFICANT CHANGE UP (ref 3.8–5.2)
RBC # BLD: 4.1 M/UL — SIGNIFICANT CHANGE UP (ref 3.8–5.2)
RBC # FLD: 15.1 % — HIGH (ref 10.3–14.5)
RBC # FLD: 15.3 % — HIGH (ref 10.3–14.5)
SODIUM SERPL-SCNC: 136 MMOL/L — SIGNIFICANT CHANGE UP (ref 135–145)
SODIUM SERPL-SCNC: 137 MMOL/L — SIGNIFICANT CHANGE UP (ref 135–145)
SPECIMEN VOL 24H UR: 2925 ML — SIGNIFICANT CHANGE UP
URATE SERPL-MCNC: 5.8 MG/DL — SIGNIFICANT CHANGE UP (ref 2.5–7)
URATE SERPL-MCNC: 5.8 MG/DL — SIGNIFICANT CHANGE UP (ref 2.5–7)
WBC # BLD: 5.92 K/UL — SIGNIFICANT CHANGE UP (ref 3.8–10.5)
WBC # BLD: 6.49 K/UL — SIGNIFICANT CHANGE UP (ref 3.8–10.5)
WBC # FLD AUTO: 5.92 K/UL — SIGNIFICANT CHANGE UP (ref 3.8–10.5)
WBC # FLD AUTO: 6.49 K/UL — SIGNIFICANT CHANGE UP (ref 3.8–10.5)

## 2019-02-12 RX ORDER — OXYTOCIN 10 UNIT/ML
2 VIAL (ML) INJECTION
Qty: 30 | Refills: 0 | Status: DISCONTINUED | OUTPATIENT
Start: 2019-02-12 | End: 2019-02-13

## 2019-02-12 RX ORDER — MAGNESIUM SULFATE 500 MG/ML
2 VIAL (ML) INJECTION
Qty: 40 | Refills: 0 | Status: DISCONTINUED | OUTPATIENT
Start: 2019-02-12 | End: 2019-02-13

## 2019-02-12 RX ORDER — MAGNESIUM HYDROXIDE 400 MG/1
30 TABLET, CHEWABLE ORAL
Qty: 0 | Refills: 0 | Status: DISCONTINUED | OUTPATIENT
Start: 2019-02-12 | End: 2019-02-14

## 2019-02-12 RX ORDER — DIBUCAINE 1 %
1 OINTMENT (GRAM) RECTAL EVERY 4 HOURS
Qty: 0 | Refills: 0 | Status: DISCONTINUED | OUTPATIENT
Start: 2019-02-12 | End: 2019-02-12

## 2019-02-12 RX ORDER — AER TRAVELER 0.5 G/1
1 SOLUTION RECTAL; TOPICAL EVERY 4 HOURS
Qty: 0 | Refills: 0 | Status: DISCONTINUED | OUTPATIENT
Start: 2019-02-12 | End: 2019-02-12

## 2019-02-12 RX ORDER — DOCUSATE SODIUM 100 MG
100 CAPSULE ORAL
Qty: 0 | Refills: 0 | Status: DISCONTINUED | OUTPATIENT
Start: 2019-02-12 | End: 2019-02-14

## 2019-02-12 RX ORDER — IBUPROFEN 200 MG
600 TABLET ORAL EVERY 6 HOURS
Qty: 0 | Refills: 0 | Status: COMPLETED | OUTPATIENT
Start: 2019-02-12 | End: 2020-01-11

## 2019-02-12 RX ORDER — HYDROCORTISONE 1 %
1 OINTMENT (GRAM) TOPICAL EVERY 4 HOURS
Qty: 0 | Refills: 0 | Status: DISCONTINUED | OUTPATIENT
Start: 2019-02-12 | End: 2019-02-12

## 2019-02-12 RX ORDER — PRAMOXINE HYDROCHLORIDE 150 MG/15G
1 AEROSOL, FOAM RECTAL EVERY 4 HOURS
Qty: 0 | Refills: 0 | Status: DISCONTINUED | OUTPATIENT
Start: 2019-02-12 | End: 2019-02-12

## 2019-02-12 RX ORDER — HYDROCORTISONE 1 %
1 OINTMENT (GRAM) TOPICAL EVERY 4 HOURS
Qty: 0 | Refills: 0 | Status: DISCONTINUED | OUTPATIENT
Start: 2019-02-12 | End: 2019-02-13

## 2019-02-12 RX ORDER — DIPHENHYDRAMINE HCL 50 MG
25 CAPSULE ORAL EVERY 6 HOURS
Qty: 0 | Refills: 0 | Status: DISCONTINUED | OUTPATIENT
Start: 2019-02-12 | End: 2019-02-14

## 2019-02-12 RX ORDER — OXYCODONE HYDROCHLORIDE 5 MG/1
5 TABLET ORAL
Qty: 0 | Refills: 0 | Status: DISCONTINUED | OUTPATIENT
Start: 2019-02-12 | End: 2019-02-14

## 2019-02-12 RX ORDER — ACETAMINOPHEN 500 MG
975 TABLET ORAL EVERY 6 HOURS
Qty: 0 | Refills: 0 | Status: DISCONTINUED | OUTPATIENT
Start: 2019-02-12 | End: 2019-02-14

## 2019-02-12 RX ORDER — GLYCERIN ADULT
1 SUPPOSITORY, RECTAL RECTAL AT BEDTIME
Qty: 0 | Refills: 0 | Status: DISCONTINUED | OUTPATIENT
Start: 2019-02-12 | End: 2019-02-14

## 2019-02-12 RX ORDER — ACETAMINOPHEN 500 MG
975 TABLET ORAL EVERY 6 HOURS
Qty: 0 | Refills: 0 | Status: COMPLETED | OUTPATIENT
Start: 2019-02-12 | End: 2020-01-11

## 2019-02-12 RX ORDER — LANOLIN
1 OINTMENT (GRAM) TOPICAL EVERY 6 HOURS
Qty: 0 | Refills: 0 | Status: DISCONTINUED | OUTPATIENT
Start: 2019-02-12 | End: 2019-02-14

## 2019-02-12 RX ORDER — DIBUCAINE 1 %
1 OINTMENT (GRAM) RECTAL EVERY 4 HOURS
Qty: 0 | Refills: 0 | Status: DISCONTINUED | OUTPATIENT
Start: 2019-02-12 | End: 2019-02-14

## 2019-02-12 RX ORDER — OXYTOCIN 10 UNIT/ML
333.33 VIAL (ML) INJECTION
Qty: 20 | Refills: 0 | Status: COMPLETED | OUTPATIENT
Start: 2019-02-12 | End: 2019-02-12

## 2019-02-12 RX ORDER — IBUPROFEN 200 MG
600 TABLET ORAL EVERY 6 HOURS
Qty: 0 | Refills: 0 | Status: DISCONTINUED | OUTPATIENT
Start: 2019-02-12 | End: 2019-02-14

## 2019-02-12 RX ORDER — TETANUS TOXOID, REDUCED DIPHTHERIA TOXOID AND ACELLULAR PERTUSSIS VACCINE, ADSORBED 5; 2.5; 8; 8; 2.5 [IU]/.5ML; [IU]/.5ML; UG/.5ML; UG/.5ML; UG/.5ML
0.5 SUSPENSION INTRAMUSCULAR ONCE
Qty: 0 | Refills: 0 | Status: DISCONTINUED | OUTPATIENT
Start: 2019-02-12 | End: 2019-02-14

## 2019-02-12 RX ORDER — LABETALOL HCL 100 MG
200 TABLET ORAL EVERY 8 HOURS
Qty: 0 | Refills: 0 | Status: DISCONTINUED | OUTPATIENT
Start: 2019-02-12 | End: 2019-02-14

## 2019-02-12 RX ORDER — ACETAMINOPHEN 500 MG
975 TABLET ORAL ONCE
Qty: 0 | Refills: 0 | Status: COMPLETED | OUTPATIENT
Start: 2019-02-12 | End: 2019-02-12

## 2019-02-12 RX ORDER — OXYTOCIN 10 UNIT/ML
41.67 VIAL (ML) INJECTION
Qty: 20 | Refills: 0 | Status: DISCONTINUED | OUTPATIENT
Start: 2019-02-12 | End: 2019-02-13

## 2019-02-12 RX ORDER — KETOROLAC TROMETHAMINE 30 MG/ML
30 SYRINGE (ML) INJECTION ONCE
Qty: 0 | Refills: 0 | Status: DISCONTINUED | OUTPATIENT
Start: 2019-02-12 | End: 2019-02-13

## 2019-02-12 RX ORDER — OXYCODONE HYDROCHLORIDE 5 MG/1
5 TABLET ORAL EVERY 4 HOURS
Qty: 0 | Refills: 0 | Status: DISCONTINUED | OUTPATIENT
Start: 2019-02-12 | End: 2019-02-14

## 2019-02-12 RX ORDER — SODIUM CHLORIDE 9 MG/ML
3 INJECTION INTRAMUSCULAR; INTRAVENOUS; SUBCUTANEOUS EVERY 8 HOURS
Qty: 0 | Refills: 0 | Status: DISCONTINUED | OUTPATIENT
Start: 2019-02-12 | End: 2019-02-12

## 2019-02-12 RX ORDER — LABETALOL HCL 100 MG
20 TABLET ORAL ONCE
Qty: 0 | Refills: 0 | Status: COMPLETED | OUTPATIENT
Start: 2019-02-12 | End: 2019-02-12

## 2019-02-12 RX ORDER — OXYTOCIN 10 UNIT/ML
41.67 VIAL (ML) INJECTION
Qty: 20 | Refills: 0 | Status: DISCONTINUED | OUTPATIENT
Start: 2019-02-12 | End: 2019-02-12

## 2019-02-12 RX ORDER — ONDANSETRON 8 MG/1
4 TABLET, FILM COATED ORAL ONCE
Qty: 0 | Refills: 0 | Status: COMPLETED | OUTPATIENT
Start: 2019-02-12 | End: 2019-02-12

## 2019-02-12 RX ORDER — SODIUM CHLORIDE 9 MG/ML
3 INJECTION INTRAMUSCULAR; INTRAVENOUS; SUBCUTANEOUS EVERY 8 HOURS
Qty: 0 | Refills: 0 | Status: DISCONTINUED | OUTPATIENT
Start: 2019-02-12 | End: 2019-02-14

## 2019-02-12 RX ORDER — AER TRAVELER 0.5 G/1
1 SOLUTION RECTAL; TOPICAL EVERY 4 HOURS
Qty: 0 | Refills: 0 | Status: DISCONTINUED | OUTPATIENT
Start: 2019-02-12 | End: 2019-02-14

## 2019-02-12 RX ORDER — PRAMOXINE HYDROCHLORIDE 150 MG/15G
1 AEROSOL, FOAM RECTAL EVERY 4 HOURS
Qty: 0 | Refills: 0 | Status: DISCONTINUED | OUTPATIENT
Start: 2019-02-12 | End: 2019-02-13

## 2019-02-12 RX ORDER — SIMETHICONE 80 MG/1
80 TABLET, CHEWABLE ORAL EVERY 6 HOURS
Qty: 0 | Refills: 0 | Status: DISCONTINUED | OUTPATIENT
Start: 2019-02-12 | End: 2019-02-14

## 2019-02-12 RX ADMIN — Medication 975 MILLIGRAM(S): at 06:48

## 2019-02-12 RX ADMIN — Medication 2 MILLIUNIT(S)/MIN: at 02:56

## 2019-02-12 RX ADMIN — ONDANSETRON 4 MILLIGRAM(S): 8 TABLET, FILM COATED ORAL at 20:30

## 2019-02-12 RX ADMIN — Medication 200 MILLIGRAM(S): at 14:06

## 2019-02-12 RX ADMIN — SODIUM CHLORIDE 2000 MILLILITER(S): 9 INJECTION, SOLUTION INTRAVENOUS at 09:49

## 2019-02-12 RX ADMIN — Medication 200 MILLIGRAM(S): at 22:20

## 2019-02-12 RX ADMIN — Medication 125 MILLIUNIT(S)/MIN: at 19:19

## 2019-02-12 RX ADMIN — Medication 50 GM/HR: at 07:16

## 2019-02-12 RX ADMIN — Medication 975 MILLIGRAM(S): at 17:00

## 2019-02-12 RX ADMIN — Medication 975 MILLIGRAM(S): at 07:30

## 2019-02-12 RX ADMIN — Medication 50 GM/HR: at 21:24

## 2019-02-12 RX ADMIN — Medication 20 MILLIGRAM(S): at 05:40

## 2019-02-12 RX ADMIN — Medication 1000 MILLIUNIT(S)/MIN: at 18:21

## 2019-02-12 RX ADMIN — Medication 50 GM/HR: at 13:16

## 2019-02-12 RX ADMIN — SODIUM CHLORIDE 3 MILLILITER(S): 9 INJECTION INTRAMUSCULAR; INTRAVENOUS; SUBCUTANEOUS at 22:20

## 2019-02-12 RX ADMIN — Medication 975 MILLIGRAM(S): at 16:20

## 2019-02-13 ENCOUNTER — TRANSCRIPTION ENCOUNTER (OUTPATIENT)
Age: 29
End: 2019-02-13

## 2019-02-13 LAB
MAGNESIUM SERPL-MCNC: 4.8 MG/DL — HIGH (ref 1.6–2.6)
MAGNESIUM SERPL-MCNC: 5 MG/DL — HIGH (ref 1.6–2.6)
MAGNESIUM SERPL-MCNC: 5.3 MG/DL — HIGH (ref 1.6–2.6)

## 2019-02-13 RX ORDER — SODIUM CHLORIDE 9 MG/ML
1000 INJECTION, SOLUTION INTRAVENOUS
Qty: 0 | Refills: 0 | Status: DISCONTINUED | OUTPATIENT
Start: 2019-02-13 | End: 2019-02-14

## 2019-02-13 RX ORDER — HYDROCORTISONE 1 %
1 OINTMENT (GRAM) TOPICAL EVERY 4 HOURS
Qty: 0 | Refills: 0 | Status: DISCONTINUED | OUTPATIENT
Start: 2019-02-13 | End: 2019-02-14

## 2019-02-13 RX ORDER — PRAMOXINE HYDROCHLORIDE 150 MG/15G
1 AEROSOL, FOAM RECTAL EVERY 4 HOURS
Qty: 0 | Refills: 0 | Status: DISCONTINUED | OUTPATIENT
Start: 2019-02-13 | End: 2019-02-14

## 2019-02-13 RX ORDER — LABETALOL HCL 100 MG
1 TABLET ORAL
Qty: 90 | Refills: 1
Start: 2019-02-13 | End: 2019-04-13

## 2019-02-13 RX ORDER — IBUPROFEN 200 MG
1 TABLET ORAL
Qty: 20 | Refills: 0 | OUTPATIENT
Start: 2019-02-13 | End: 2019-02-17

## 2019-02-13 RX ADMIN — Medication 975 MILLIGRAM(S): at 10:30

## 2019-02-13 RX ADMIN — Medication 975 MILLIGRAM(S): at 17:56

## 2019-02-13 RX ADMIN — Medication 975 MILLIGRAM(S): at 11:30

## 2019-02-13 RX ADMIN — SODIUM CHLORIDE 3 MILLILITER(S): 9 INJECTION INTRAMUSCULAR; INTRAVENOUS; SUBCUTANEOUS at 17:43

## 2019-02-13 RX ADMIN — SIMETHICONE 80 MILLIGRAM(S): 80 TABLET, CHEWABLE ORAL at 17:44

## 2019-02-13 RX ADMIN — Medication 200 MILLIGRAM(S): at 21:30

## 2019-02-13 RX ADMIN — Medication 50 GM/HR: at 07:30

## 2019-02-13 RX ADMIN — Medication 600 MILLIGRAM(S): at 11:30

## 2019-02-13 RX ADMIN — Medication 200 MILLIGRAM(S): at 14:49

## 2019-02-13 RX ADMIN — Medication 975 MILLIGRAM(S): at 18:56

## 2019-02-13 RX ADMIN — SODIUM CHLORIDE 3 MILLILITER(S): 9 INJECTION INTRAMUSCULAR; INTRAVENOUS; SUBCUTANEOUS at 06:19

## 2019-02-13 RX ADMIN — Medication 1 TABLET(S): at 12:07

## 2019-02-13 RX ADMIN — Medication 200 MILLIGRAM(S): at 06:18

## 2019-02-13 RX ADMIN — Medication 600 MILLIGRAM(S): at 10:30

## 2019-02-13 RX ADMIN — SODIUM CHLORIDE 3 MILLILITER(S): 9 INJECTION INTRAMUSCULAR; INTRAVENOUS; SUBCUTANEOUS at 21:30

## 2019-02-13 NOTE — DISCHARGE NOTE OB - CARE PLAN
Principal Discharge DX:	 (normal spontaneous vaginal delivery)  Goal:	Full recovery  Assessment and plan of treatment:	Regular diet, activity as tolerated, Pelvic rest x 6 weeks  F/U with Dr. Evans in 1 week for BP check  Secondary Diagnosis:	Preeclampsia, third trimester  Goal:	Resolution  Assessment and plan of treatment:	Labetalol 200 mg PO TID  F/U BP daily

## 2019-02-13 NOTE — DISCHARGE NOTE OB - PATIENT PORTAL LINK FT
You can access the [facility], offered by [facility], by registering with the following website: [email or web address]

## 2019-02-13 NOTE — DISCHARGE NOTE OB - MATERIALS PROVIDED
Guide to Postpartum Care/Shaken Baby Prevention Handout/Vaccinations/Birth Certificate Instructions/[facility] Hearing Screen Program/  Immunization Record

## 2019-02-13 NOTE — PROGRESS NOTE ADULT - ASSESSMENT
A/P: 27yo PPD#1 s/p . Antepartum c/b SPEC, currently on Mg. HELLP labs wnl. BPs well controlled postpartum.  Patient is doing well postpartum.

## 2019-02-13 NOTE — DISCHARGE NOTE OB - HOSPITAL COURSE
27 yo  at 37+4/7 weeks IUP admitted on 19 for IOL due to PEC. Started with PO Cytotec then Pitocin. Delivered a live Male on 19 via Vaginal Delivery. Apgar was 8/9 in 1/5 minutes. Weight 3240 Gms. She was magnesium before and 24 hours after the delivery. Overall PEC labs were normal.  PP H/H was 11.1/35.9. Her BP was well controlled on PO labetalol 200 mg TID. Her pain was controlled, VS were stable. She was D/Cd home on PPD # 2 in stable condition.

## 2019-02-13 NOTE — DISCHARGE NOTE OB - PLAN OF CARE
Full recovery Regular diet, activity as tolerated, Pelvic rest x 6 weeks  F/U with Dr. Evans in 1 week for BP check Resolution Labetalol 200 mg PO TID  F/U BP daily

## 2019-02-13 NOTE — DISCHARGE NOTE OB - CARE PROVIDER_API CALL
Kohanim, Behnam (MD)  Obstetrics and Gynecology  [address], [address]  [place] [address]  Phone: [phone]  Fax: [phone]  Follow Up Time:

## 2019-02-13 NOTE — PROGRESS NOTE ADULT - ASSESSMENT
A/P : PPD#1 s/p , IOL for PEC    BP better  D/C Mag  Doing well  Anticipate D/C in am  D/C instruction given  F/U with Dr. Evans in 1 week for BP check  Labetalol 200 mg BID and Motrin PRN A/P : PPD#1 s/p , IOL for PEC    BP better  D/C Mag  Doing well  Anticipate D/C in am  D/C instruction given  F/U with Dr. Evans in 1 week for BP check  Labetalol 200 mg TID and Motrin PRN

## 2019-02-13 NOTE — DISCHARGE NOTE OB - MEDICATION SUMMARY - MEDICATIONS TO TAKE
I will START or STAY ON the medications listed below when I get home from the hospital:    ibuprofen 600 mg oral tablet  -- 1 tab(s) by mouth every 6 hours, As Needed MDD:4  -- Indication: For  (normal spontaneous vaginal delivery)    labetalol 200 mg oral tablet  -- 1 tab(s) by mouth every 8 hours  -- Indication: For  (normal spontaneous vaginal delivery)

## 2019-02-13 NOTE — PROGRESS NOTE ADULT - SUBJECTIVE AND OBJECTIVE BOX
INTERVAL HPI/OVERNIGHT EVENTS:  28y Female s/p labor epidural on     Vital Signs Last 24 Hrs  T(C): 36.9 (13 Feb 2019 06:12), Max: 37.2 (12 Feb 2019 18:05)  T(F): 98.4 (13 Feb 2019 06:12), Max: 99 (12 Feb 2019 18:05)  HR: 88 (13 Feb 2019 06:12) (83 - 97)  BP: 118/64 (13 Feb 2019 06:12) (105/56 - 143/78)  BP(mean): --  RR: 17 (13 Feb 2019 06:12) (16 - 18)  SpO2: 98% (13 Feb 2019 06:12) (94% - 100%)    Patient seen, doing well, no headache, no residual numbness or weakness, no anesthetic complications or complaints noted or reported.

## 2019-02-13 NOTE — PROGRESS NOTE ADULT - SUBJECTIVE AND OBJECTIVE BOX
OB Attending on call: PPD#1 s/p , IOL for PEC    Patient evaluated at bedside. Patient's pain is well controlled with Motrin. S/P Magnesium.   Patient denies headache, dizziness, chest pain, palpitations, shortness of breath, nausea, vomiting, heavy vaginal bleeding or perineal discomfort.  Patient has been ambulating without assistance, voiding spontaneously, tolerating diet, and is breastfeeding.    Physical Exam:  Vital Signs Last 24 Hrs  T(C): 36.8 (2019 14:15), Max: 37.2 (2019 18:05)  T(F): 98.2 (2019 14:15), Max: 99 (2019 18:05)  HR: 84 (2019 16:00) (79 - 97)  BP: 126/62 (2019 16:00) (105/56 - 143/78)  BP(mean): --  RR: 18 (2019 16:00) (16 - 20)  SpO2: 99% (2019 16:00) (94% - 100%)  I&O's Summary    2019 07:01  -  2019 07:00  --------------------------------------------------------  IN: 2650 mL / OUT: 3485 mL / NET: -835 mL    2019 07:01  -  2019 17:16  --------------------------------------------------------  IN: 1000 mL / OUT: 900 mL / NET: 100 mL        NAD, A+0 x 3  Breasts: soft, nontender, no palpable masses, nipples intact and everted  Abd:  soft, nontender, nondistended, no rebound or guarding, uterus firm at midline,   : lochia WNL  Extremities: no edema or calf tenderness bilaterally                        11.1   5.92  )-----------( 274      ( 2019 10:45 )             35.9                         10.8   6.49  )-----------( 232      ( 2019 05:50 )             33.8                         10.4   6.20  )-----------( 231      ( 2019 12:35 )             33.3                         10.9   7.24  )-----------( 242      ( 10 Feb 2019 20:59 )             34.4     02-12    137  |  98  |  4<L>  ----------------------------<  87  4.4   |  24  |  0.56    Ca    8.2<L>      2019 10:45  Mg     5.3     02-13    TPro  6.7  /  Alb  3.6  /  TBili  < 0.2<L>  /  DBili  x   /  AST  17  /  ALT  6   /  AlkPhos  156<H>  02-12    PT/INR - ( 2019 10:45 )   PT: 10.7 SEC;   INR: 0.94          PTT - ( 2019 10:45 )  PTT:30.1 SEC      Assessment:  stable postpartum  labs wnl  breastfeeding well    Plan:  encourage ambulation and po fluids  Encourage breastfeeding  anticipate discharge home tomorrow OB Attending on call: PPD#1 s/p , IOL for PEC    Patient evaluated at bedside. Patient's pain is well controlled with Motrin. S/P Magnesium.   Patient denies headache, dizziness, chest pain, palpitations, shortness of breath, nausea, vomiting, heavy vaginal bleeding or perineal discomfort.  Patient has been ambulating without assistance, voiding spontaneously, tolerating diet, and is breastfeeding.     Physical Exam:  Vital Signs Last 24 Hrs  T(C): 36.8 (2019 14:15), Max: 37.2 (2019 18:05)  T(F): 98.2 (2019 14:15), Max: 99 (2019 18:05)  HR: 84 (2019 16:00) (79 - 97)  BP: 126/62 (2019 16:00) (105/56 - 143/78)  BP(mean): --  RR: 18 (2019 16:00) (16 - 20)  SpO2: 99% (2019 16:00) (94% - 100%)  I&O's Summary    2019 07:01  -  2019 07:00  --------------------------------------------------------  IN: 2650 mL / OUT: 3485 mL / NET: -835 mL    2019 07:01  -  2019 17:16  --------------------------------------------------------  IN: 1000 mL / OUT: 900 mL / NET: 100 mL        NAD, A+0 x 3  Breasts: soft, nontender, no palpable masses, nipples intact and everted  Abd:  soft, nontender, nondistended, no rebound or guarding, uterus firm at midline,   : lochia WNL  Extremities: no edema or calf tenderness bilaterally                        11.1   5.92  )-----------( 274      ( 2019 10:45 )             35.9                         10.8   6.49  )-----------( 232      ( 2019 05:50 )             33.8                         10.4   6.20  )-----------( 231      ( 2019 12:35 )             33.3                         10.9   7.24  )-----------( 242      ( 10 Feb 2019 20:59 )             34.4     02-12    137  |  98  |  4<L>  ----------------------------<  87  4.4   |  24  |  0.56    Ca    8.2<L>      2019 10:45  Mg     5.3     02-13    TPro  6.7  /  Alb  3.6  /  TBili  < 0.2<L>  /  DBili  x   /  AST  17  /  ALT  6   /  AlkPhos  156<H>  02-12    PT/INR - ( 2019 10:45 )   PT: 10.7 SEC;   INR: 0.94          PTT - ( 2019 10:45 )  PTT:30.1 SEC

## 2019-02-13 NOTE — PROGRESS NOTE ADULT - SUBJECTIVE AND OBJECTIVE BOX
OB Progress Note:  PPD#1    S: 27yo  PPD#1 s/p . Patient feels well. Pain is well controlled. She is tolerating a regular diet and passing flatus. She is ambulating without difficulty. Denies CP/SOB. Denies lightheadedness/dizziness. Denies N/V.    O:  Vitals:  Vital Signs Last 24 Hrs  T(C): 36.8 (2019 04:18), Max: 37.2 (2019 18:05)  T(F): 98.3 (2019 04:18), Max: 99 (2019 18:05)  HR: 94 (2019 04:18) (83 - 97)  BP: 131/72 (2019 04:18) (105/56 - 143/78)  BP(mean): --  RR: 17 (2019 04:18) (16 - 18)  SpO2: 98% (2019 04:18) (94% - 100%)    MEDICATIONS  (STANDING):  acetaminophen   Tablet .. 975 milliGRAM(s) Oral every 6 hours  diphtheria/tetanus/pertussis (acellular) Vaccine (ADAcel) 0.5 milliLiter(s) IntraMuscular once  ibuprofen  Tablet. 600 milliGRAM(s) Oral every 6 hours  ketorolac   Injectable 30 milliGRAM(s) IV Push once  labetalol 200 milliGRAM(s) Oral every 8 hours  Lysine 1000mg Tablet 1 Tablet(s) 1 Tablet(s) Oral daily  magnesium sulfate Infusion 2 Gm/Hr (50 mL/Hr) IV Continuous <Continuous>  oxyCODONE    IR 5 milliGRAM(s) Oral every 3 hours  prenatal multivitamin 1 Tablet(s) Oral daily  sodium chloride 0.9% lock flush 3 milliLiter(s) IV Push every 8 hours      Labs:  Blood type: O Positive  Rubella IgG: RPR: Negative                          11.1<L>   5.92 >-----------< 274    (  @ 10:45 )             35.9                        10.8<L>   6.49 >-----------< 232    (  @ 05:50 )             33.8<L>                        10.4<L>   6.20 >-----------< 231    (  @ 12:35 )             33.3<L>                        10.9<L>   7.24 >-----------< 242    ( 02-10 @ 20:59 )             34.4<L>    19 @ 10:45      137  |  98  |  4<L>  ----------------------------<  87  4.4   |  24  |  0.56    19 @ 05:50      136  |  99  |  4<L>  ----------------------------<  100<H>  4.0   |  21<L>  |  0.46<L>    19 @ 12:35      137  |  103  |  6<L>  ----------------------------<  85  3.8   |  22  |  0.47<L>    02-10-19 @ 20:59      136  |  100  |  8   ----------------------------<  125<H>  4.2   |  22  |  0.53        Ca    8.2<L>      2019 10:45  Ca    8.0<L>      2019 05:50  Ca    8.5      2019 12:35  Ca    9.3      10 Feb 2019 20:59  Mg     4.8<H>     02-13  Mg     5.2<H>     02-12  Mg     5.3<H>     02-12  Mg     5.4<H>     02-12  Mg     4.8<H>     02-12  Mg     4.8<H>     02-11  Mg     4.3<H>         TPro  6.7  /  Alb  3.6  /  TBili  < 0.2<L>  /  DBili  x   /  AST  17  /  ALT  6   /  AlkPhos  156<H>  19 @ 10:45  TPro  6.5  /  Alb  3.3  /  TBili  < 0.2<L>  /  DBili  x   /  AST  18  /  ALT  9   /  AlkPhos  150<H>  19 @ 05:50  TPro  6.3  /  Alb  3.2<L>  /  TBili  < 0.2<L>  /  DBili  x   /  AST  16  /  ALT  9   /  AlkPhos  138<H>  19 @ 12:35  TPro  6.6  /  Alb  3.5  /  TBili  < 0.2<L>  /  DBili  x   /  AST  15  /  ALT  7   /  AlkPhos  156<H>  02-10-19 @ 20:59          Physical Exam:  General: NAD  Abdomen: soft, non-tender, non-distended, fundus firm  Vaginal: Lochia wnl  Extremities: No erythema/edema

## 2019-02-14 VITALS — HEART RATE: 84 BPM

## 2019-02-14 DIAGNOSIS — O14.93 UNSPECIFIED PRE-ECLAMPSIA, THIRD TRIMESTER: ICD-10-CM

## 2019-02-14 RX ADMIN — Medication 1 TABLET(S): at 12:07

## 2019-02-14 RX ADMIN — Medication 200 MILLIGRAM(S): at 06:25

## 2019-02-14 RX ADMIN — SODIUM CHLORIDE 3 MILLILITER(S): 9 INJECTION INTRAMUSCULAR; INTRAVENOUS; SUBCUTANEOUS at 06:27

## 2019-02-14 RX ADMIN — SIMETHICONE 80 MILLIGRAM(S): 80 TABLET, CHEWABLE ORAL at 00:25

## 2019-02-14 RX ADMIN — Medication 100 MILLIGRAM(S): at 00:25

## 2019-02-14 RX ADMIN — Medication 975 MILLIGRAM(S): at 09:30

## 2019-02-14 RX ADMIN — Medication 975 MILLIGRAM(S): at 08:50

## 2019-02-14 NOTE — PROGRESS NOTE ADULT - SUBJECTIVE AND OBJECTIVE BOX
S: 28y PPD#2  Patient doing well. Minimal to moderate lochia. Pain controlled. Voiding. Passing Flatus. Tolerating a regular diet.   Denies chest pain, palpitations, h/a, changes in vision or RUQ pain, no leg pain  O: Vital Signs Last 24 Hrs  T(C): 36.9 (2019 10:03), Max: 36.9 (2019 05:15)  T(F): 98.4 (2019 10:03), Max: 98.4 (2019 05:15)  HR: 84 (2019 10:20) (79 - 105)  BP: 120/75 (2019 10:03) (119/66 - 140/80)  RR: 18 (2019 10:03) (16 - 18)  SpO2: 100% (2019 10:03) (98% - 100%)    Gen: NAD  Abd: soft, NT, ND, fundus firm below umbilicus  Lochia: moderate  Ext: no tenderness    Labs: reviewed        A: 28y PPD#2 s/p  doing well. Diagnosed and treated with PEC. s/p Mg SO4 for seizure prophylaxis. Blood pressures controlled with Labetalol. Patient is asymptomatic. Plan to discharge home to follow up with obstetrician in 7-10 days.

## 2019-02-14 NOTE — PROGRESS NOTE ADULT - SUBJECTIVE AND OBJECTIVE BOX
OB Progress Note:  PPD#2    S: 29yo PPD#2 s/p . Patient feels well. Pain is well controlled. She is tolerating a regular diet and passing flatus. She is voiding spontaneously, and ambulating without difficulty. She is breastfeeding. Denies CP/SOB. Denies lightheadedness/dizziness. Denies N/V.    O:  Vitals:  Vital Signs Last 24 Hrs  T(C): 36.9 (2019 05:15), Max: 36.9 (2019 10:00)  T(F): 98.4 (2019 05:15), Max: 98.4 (2019 10:00)  HR: 86 (2019 06:24) (79 - 90)  BP: 121/72 (2019 06:24) (118/72 - 140/80)  BP(mean): --  RR: 17 (2019 06:24) (16 - 20)  SpO2: 98% (2019 06:24) (98% - 100%)    MEDICATIONS  (STANDING):  acetaminophen   Tablet .. 975 milliGRAM(s) Oral every 6 hours  diphtheria/tetanus/pertussis (acellular) Vaccine (ADAcel) 0.5 milliLiter(s) IntraMuscular once  ibuprofen  Tablet. 600 milliGRAM(s) Oral every 6 hours  labetalol 200 milliGRAM(s) Oral every 8 hours  lactated ringers. 1000 milliLiter(s) (50 mL/Hr) IV Continuous <Continuous>  Lysine 1000mg Tablet 1 Tablet(s) 1 Tablet(s) Oral daily  oxyCODONE    IR 5 milliGRAM(s) Oral every 3 hours  prenatal multivitamin 1 Tablet(s) Oral daily  sodium chloride 0.9% lock flush 3 milliLiter(s) IV Push every 8 hours      Labs:  Blood type: O Positive  Rubella IgG: RPR: Negative                          11.1<L>   5.92 >-----------< 274    (  @ 10:45 )             35.9                        10.8<L>   6.49 >-----------< 232    (  @ 05:50 )             33.8<L>                        10.4<L>   6.20 >-----------< 231    (  @ 12:35 )             33.3<L>    19 @ 10:45      137  |  98  |  4<L>  ----------------------------<  87  4.4   |  24  |  0.56    19 @ 05:50      136  |  99  |  4<L>  ----------------------------<  100<H>  4.0   |  21<L>  |  0.46<L>    19 @ 12:35      137  |  103  |  6<L>  ----------------------------<  85  3.8   |  22  |  0.47<L>        Ca    8.2<L>      2019 10:45  Ca    8.0<L>      2019 05:50  Ca    8.5      2019 12:35  Mg     5.3<H>     02-13  Mg     5.0<H>     02-13  Mg     4.8<H>     02-13  Mg     5.2<H>     02-12  Mg     5.3<H>     02-12  Mg     5.4<H>     02-12  Mg     4.8<H>     02-12  Mg     4.8<H>     02-11  Mg     4.3<H>     11    TPro  6.7  /  Alb  3.6  /  TBili  < 0.2<L>  /  DBili  x   /  AST  17  /  ALT  6   /  AlkPhos  156<H>  19 @ 10:45  TPro  6.5  /  Alb  3.3  /  TBili  < 0.2<L>  /  DBili  x   /  AST  18  /  ALT  9   /  AlkPhos  150<H>  19 @ 05:50  TPro  6.3  /  Alb  3.2<L>  /  TBili  < 0.2<L>  /  DBili  x   /  AST  16  /  ALT  9   /  AlkPhos  138<H>  19 @ 12:35          Physical Exam:  General: NAD  Abdomen: soft, non-tender, non-distended, fundus firm  Vaginal: Lochia wnl  Extremities: No erythema/edema

## 2019-02-14 NOTE — PROGRESS NOTE ADULT - ASSESSMENT
A/P: 27yo PPD#2 s/p . Antepartum c/b sPEC s/p Mg. BPs well controlled on Labetalol 200 TID. HELLP labs wnl. Patient asymptomatic. Patient doing well postpartum.

## 2019-02-14 NOTE — PROGRESS NOTE ADULT - PROBLEM SELECTOR PLAN 1
- Pain well controlled, continue current pain regimen  - Increase ambulation, SCDs when not ambulating  - Continue regular diet  - Discharge Planning
- C/w Labetalol 200 BID   - D/c Mg at 6PM  - Pain well controlled, continue current pain regimen  - Increase ambulation, SCDs when not ambulating  - Continue regular diet  - Standing colace

## 2019-02-28 ENCOUNTER — APPOINTMENT (OUTPATIENT)
Dept: ANTEPARTUM | Facility: CLINIC | Age: 29
End: 2019-02-28

## 2019-03-20 ENCOUNTER — EMERGENCY (EMERGENCY)
Age: 29
LOS: 1 days | Discharge: HOME | End: 2019-03-20
Attending: EMERGENCY MEDICINE | Admitting: EMERGENCY MEDICINE
Payer: COMMERCIAL

## 2019-03-20 VITALS
TEMPERATURE: 98 F | SYSTOLIC BLOOD PRESSURE: 130 MMHG | OXYGEN SATURATION: 100 % | RESPIRATION RATE: 15 BRPM | DIASTOLIC BLOOD PRESSURE: 90 MMHG | HEART RATE: 77 BPM

## 2019-03-20 VITALS
RESPIRATION RATE: 18 BRPM | OXYGEN SATURATION: 100 % | DIASTOLIC BLOOD PRESSURE: 86 MMHG | SYSTOLIC BLOOD PRESSURE: 144 MMHG | HEART RATE: 74 BPM | TEMPERATURE: 98 F

## 2019-03-20 DIAGNOSIS — Z98.89 OTHER SPECIFIED POSTPROCEDURAL STATES: Chronic | ICD-10-CM

## 2019-03-20 DIAGNOSIS — O03.9 COMPLETE OR UNSPECIFIED SPONTANEOUS ABORTION WITHOUT COMPLICATION: Chronic | ICD-10-CM

## 2019-03-20 PROCEDURE — 99283 EMERGENCY DEPT VISIT LOW MDM: CPT

## 2019-03-20 RX ORDER — DIAZEPAM 5 MG
5 TABLET ORAL ONCE
Qty: 0 | Refills: 0 | Status: DISCONTINUED | OUTPATIENT
Start: 2019-03-20 | End: 2019-03-20

## 2019-03-20 RX ORDER — IBUPROFEN 200 MG
1 TABLET ORAL
Qty: 12 | Refills: 0 | OUTPATIENT
Start: 2019-03-20 | End: 2019-03-22

## 2019-03-20 RX ORDER — IBUPROFEN 200 MG
600 TABLET ORAL ONCE
Qty: 0 | Refills: 0 | Status: COMPLETED | OUTPATIENT
Start: 2019-03-20 | End: 2019-03-20

## 2019-03-20 RX ORDER — METHOCARBAMOL 500 MG/1
2 TABLET, FILM COATED ORAL
Qty: 30 | Refills: 0 | OUTPATIENT
Start: 2019-03-20 | End: 2019-03-24

## 2019-03-20 RX ORDER — METHOCARBAMOL 500 MG/1
2 TABLET, FILM COATED ORAL
Qty: 30 | Refills: 0 | COMMUNITY
Start: 2019-03-20 | End: 2019-03-24

## 2019-03-20 RX ORDER — KETOROLAC TROMETHAMINE 30 MG/ML
30 SYRINGE (ML) INJECTION ONCE
Qty: 0 | Refills: 0 | Status: DISCONTINUED | OUTPATIENT
Start: 2019-03-20 | End: 2019-03-20

## 2019-03-20 RX ORDER — LIDOCAINE 4 G/100G
1 CREAM TOPICAL ONCE
Qty: 0 | Refills: 0 | Status: COMPLETED | OUTPATIENT
Start: 2019-03-20 | End: 2019-03-20

## 2019-03-20 RX ADMIN — Medication 30 MILLIGRAM(S): at 15:55

## 2019-03-20 RX ADMIN — Medication 5 MILLIGRAM(S): at 12:46

## 2019-03-20 RX ADMIN — LIDOCAINE 1 PATCH: 4 CREAM TOPICAL at 12:46

## 2019-03-20 RX ADMIN — Medication 600 MILLIGRAM(S): at 12:46
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
## 2019-03-20 NOTE — ED PROVIDER NOTE - CONDITION AT DISCHARGE:
"Allyssa Wright  : 1980   MRN: [id number]  Date: 2018     Electroconvulsive Therapy  History & Physical    Chief complaint: MDD, recurrent,in partial remission Procedure: ECT #35    SUBJECTIVE:     HPI:   Allyssa Wright is a 38 y.o. female with MDD, recurrent, in partial remission who presents for ECT. The patient reports that her mood is improved since starting what she calls her "booster series." She is concerned about making sure that she will come back on Monday for her next treatment and how the treatments will be spaced out after that.  Please see Dr. Boyce's full pre-ECT evaluation for further details. The patient does not need any prescriptions as she follows with Dr. Jc. Patient saw Dr. Jc yesterday. Her Remeron is at 7.5mg nightly, and the effexor has been switched to 225mg daily. She is supposed to start Tritellix 5mg later today.The patient has not had anything by mouth since midnight and is ready for ECT.    Psychiatric Review of Systems:  Mood: "better"  Appetite: No problem  Psychomotor: No problem  Cognitive Impairment: minimal memory problems-not worse than normal  Insomnia: None  Psychosis: None  Diurnal Variation: None  Suicidal Ideation: Absent    Medical Review Of Systems:  Constitutional: negative for fatigue  Respiratory: negative for cough  Cardiovascular: negative for chest pain  Gastrointestinal: negative for abdominal pain  Musculoskeletal:negative for muscle weakness    Current Medications:   No current facility-administered medications on file prior to encounter.      Current Outpatient Prescriptions on File Prior to Encounter   Medication Sig Dispense Refill    clozapine (CLOZARIL) 50 MG tablet Take 100 mg by mouth once daily.       dapsone 7.5 % GlwP Apply topically once daily.      dextroamphetamine-amphetamine 30 mg Tab Take 30 mg by mouth 2 (two) times daily.       famciclovir (FAMVIR) 500 MG tablet Take 1 tablet (500 mg total) by mouth 2 (two) times daily. 30 " tablet 12    mirtazapine (REMERON) 15 MG tablet Take 1 tablet (15 mg total) by mouth every evening. (Patient taking differently: Take 7.5 mg by mouth every evening. ) 90 tablet 0    spironolactone (ALDACTONE) 50 MG tablet 50 mg 2 (two) times daily. 50  mg qAM, 50 mg qHS.      thyroid (ARMOUR THYROID) 30 mg Tab Take 1 tablet (30 mg total) by mouth every morning. 30 tablet 11    trazodone (DESYREL) 100 MG tablet Take 200 mg by mouth every evening.       venlafaxine (EFFEXOR) 100 MG Tab Take 225 mg by mouth once daily.       hydrOXYzine pamoate (VISTARIL) 25 MG Cap Take 1 capsule (25 mg total) by mouth every 8 (eight) hours as needed (anxiety). (Patient taking differently: Take 50 mg by mouth every 8 (eight) hours as needed (anxiety). ) 90 capsule 1    UNABLE TO FIND 2 (two) times daily. n-azetyl-cysteine      ZOVIRAX 5 % Crea   5      Allergies:   Benzodiazepines; Ampicillin; Erythromycin; Levaquin [levofloxacin]; Penicillins; Pristiq [desvenlafaxine]; Sulfa (sulfonamide antibiotics); and Azithromycin    Past Medical/Surgical History:   Past Medical History:   Diagnosis Date    Anxiety     Depression     History of psychiatric hospitalization     HSV-1 (herpes simplex virus 1) infection     Hx of psychiatric care     Moderate depressed bipolar II disorder 06/13/2016    reports no history of bipolar    Obsessive-compulsive disorder     Psychiatric problem     Self-harming behavior     Therapy      Past Surgical History:   Procedure Laterality Date    ANKLE SURGERY Right     BREAST augmentation      OVARIAN CYST REMOVAL Bilateral       OBJECTIVE:     Vitals (pre-procedure):  Vitals:    02/23/18 0622   BP: 111/65   Pulse: 95   Resp: 20   Temp: 97.9 °F (36.6 °C)        Labs/Imaging/Studies:   Recent Results (from the past 48 hour(s))   POCT urine pregnancy    Collection Time: 02/23/18  6:42 AM   Result Value Ref Range    POC Preg Test, Ur Negative Negative     Acceptable Yes       No  "results found for: PHENYTOIN, PHENOBARB, VALPROATE, CBMZ    Physical Exam:   Gen: AAOx4, NAD  HEENT: MMM, PERRL, EOMI, O/P clear  CV: RRR, S1/S2 nml, no M/R/G  Chest: CTAB, no R/R/W, unlabored breathing  Abd: S/NT/ND, +BS, no HSM  Ext: No C/C/E, pulse 2+ throughout  Neuro: CN II-XII grossly intact, no focal deficits    Mental Status Exam:   Appearance: unremarkable, age appropriate, casually dressed, neatly groomed  Behavior: friendly and cooperative, eye contact normal  Speech: normal tone, normal rate, normal pitch, normal volume  Mood: "better"  Affect: Normal   Thought Process: normal and logical  Thought Content: normal, no suicidality, no homicidality, delusions, or paranoia  Cognition: grossly intact  Insight: fair  Judgment: fair    ASSESSMENT/PLAN:     Allyssa Wright is a 38 y.o. female with MDD, recurrent, in partial remission who presents for ECT.    Recommendations:   Proceed with ECT #35.      Hanane Remy MD  2/23/2018  " Satisfactory
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
## 2019-04-02 ENCOUNTER — EMERGENCY (EMERGENCY)
Facility: HOSPITAL | Age: 29
LOS: 1 days | Discharge: HOME | End: 2019-04-02
Attending: EMERGENCY MEDICINE | Admitting: INTERNAL MEDICINE
Payer: COMMERCIAL

## 2019-04-02 VITALS
OXYGEN SATURATION: 100 % | TEMPERATURE: 97 F | HEART RATE: 65 BPM | RESPIRATION RATE: 14 BRPM | SYSTOLIC BLOOD PRESSURE: 154 MMHG | DIASTOLIC BLOOD PRESSURE: 97 MMHG

## 2019-04-02 DIAGNOSIS — Z98.89 OTHER SPECIFIED POSTPROCEDURAL STATES: Chronic | ICD-10-CM

## 2019-04-02 DIAGNOSIS — O03.9 COMPLETE OR UNSPECIFIED SPONTANEOUS ABORTION WITHOUT COMPLICATION: Chronic | ICD-10-CM

## 2019-04-02 LAB
ALBUMIN SERPL ELPH-MCNC: 4.6 G/DL — SIGNIFICANT CHANGE UP (ref 3.3–5)
ALP SERPL-CCNC: 109 U/L — SIGNIFICANT CHANGE UP (ref 40–120)
ALT FLD-CCNC: 20 U/L — SIGNIFICANT CHANGE UP (ref 4–33)
ANION GAP SERPL CALC-SCNC: 13 MMO/L — SIGNIFICANT CHANGE UP (ref 7–14)
AST SERPL-CCNC: 18 U/L — SIGNIFICANT CHANGE UP (ref 4–32)
BASOPHILS # BLD AUTO: 0.14 K/UL — SIGNIFICANT CHANGE UP (ref 0–0.2)
BASOPHILS NFR BLD AUTO: 2.5 % — HIGH (ref 0–2)
BILIRUB SERPL-MCNC: < 0.2 MG/DL — LOW (ref 0.2–1.2)
BUN SERPL-MCNC: 14 MG/DL — SIGNIFICANT CHANGE UP (ref 7–23)
CALCIUM SERPL-MCNC: 9.6 MG/DL — SIGNIFICANT CHANGE UP (ref 8.4–10.5)
CHLORIDE SERPL-SCNC: 103 MMOL/L — SIGNIFICANT CHANGE UP (ref 98–107)
CK SERPL-CCNC: 177 U/L — HIGH (ref 25–170)
CO2 SERPL-SCNC: 24 MMOL/L — SIGNIFICANT CHANGE UP (ref 22–31)
CREAT SERPL-MCNC: 0.76 MG/DL — SIGNIFICANT CHANGE UP (ref 0.5–1.3)
D DIMER BLD IA.RAPID-MCNC: < 150 NG/ML — SIGNIFICANT CHANGE UP
EOSINOPHIL # BLD AUTO: 0.3 K/UL — SIGNIFICANT CHANGE UP (ref 0–0.5)
EOSINOPHIL NFR BLD AUTO: 5.3 % — SIGNIFICANT CHANGE UP (ref 0–6)
GLUCOSE SERPL-MCNC: 93 MG/DL — SIGNIFICANT CHANGE UP (ref 70–99)
HCT VFR BLD CALC: 37.3 % — SIGNIFICANT CHANGE UP (ref 34.5–45)
HGB BLD-MCNC: 11 G/DL — LOW (ref 11.5–15.5)
IMM GRANULOCYTES NFR BLD AUTO: 0.2 % — SIGNIFICANT CHANGE UP (ref 0–1.5)
LYMPHOCYTES # BLD AUTO: 2.5 K/UL — SIGNIFICANT CHANGE UP (ref 1–3.3)
LYMPHOCYTES # BLD AUTO: 43.9 % — SIGNIFICANT CHANGE UP (ref 13–44)
MCHC RBC-ENTMCNC: 25.8 PG — LOW (ref 27–34)
MCHC RBC-ENTMCNC: 29.5 % — LOW (ref 32–36)
MCV RBC AUTO: 87.6 FL — SIGNIFICANT CHANGE UP (ref 80–100)
MONOCYTES # BLD AUTO: 0.56 K/UL — SIGNIFICANT CHANGE UP (ref 0–0.9)
MONOCYTES NFR BLD AUTO: 9.8 % — SIGNIFICANT CHANGE UP (ref 2–14)
NEUTROPHILS # BLD AUTO: 2.18 K/UL — SIGNIFICANT CHANGE UP (ref 1.8–7.4)
NEUTROPHILS NFR BLD AUTO: 38.3 % — LOW (ref 43–77)
NRBC # FLD: 0 K/UL — SIGNIFICANT CHANGE UP (ref 0–0)
PLATELET # BLD AUTO: 293 K/UL — SIGNIFICANT CHANGE UP (ref 150–400)
PMV BLD: 11.5 FL — SIGNIFICANT CHANGE UP (ref 7–13)
POTASSIUM SERPL-MCNC: 4 MMOL/L — SIGNIFICANT CHANGE UP (ref 3.5–5.3)
POTASSIUM SERPL-SCNC: 4 MMOL/L — SIGNIFICANT CHANGE UP (ref 3.5–5.3)
PROT SERPL-MCNC: 7.5 G/DL — SIGNIFICANT CHANGE UP (ref 6–8.3)
RBC # BLD: 4.26 M/UL — SIGNIFICANT CHANGE UP (ref 3.8–5.2)
RBC # FLD: 16 % — HIGH (ref 10.3–14.5)
SODIUM SERPL-SCNC: 140 MMOL/L — SIGNIFICANT CHANGE UP (ref 135–145)
TROPONIN T, HIGH SENSITIVITY: 6 NG/L — SIGNIFICANT CHANGE UP (ref ?–14)
WBC # BLD: 5.69 K/UL — SIGNIFICANT CHANGE UP (ref 3.8–10.5)
WBC # FLD AUTO: 5.69 K/UL — SIGNIFICANT CHANGE UP (ref 3.8–10.5)

## 2019-04-02 PROCEDURE — 71046 X-RAY EXAM CHEST 2 VIEWS: CPT | Mod: 26

## 2019-04-02 PROCEDURE — 99285 EMERGENCY DEPT VISIT HI MDM: CPT

## 2019-04-02 RX ORDER — ASPIRIN/CALCIUM CARB/MAGNESIUM 324 MG
162 TABLET ORAL DAILY
Qty: 0 | Refills: 0 | Status: DISCONTINUED | OUTPATIENT
Start: 2019-04-02 | End: 2019-04-03

## 2019-04-02 RX ORDER — LABETALOL HCL 100 MG
10 TABLET ORAL ONCE
Qty: 0 | Refills: 0 | Status: COMPLETED | OUTPATIENT
Start: 2019-04-02 | End: 2019-04-02

## 2019-04-02 RX ADMIN — Medication 162 MILLIGRAM(S): at 23:01

## 2019-04-02 RX ADMIN — Medication 10 MILLIGRAM(S): at 23:01
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
## 2019-04-02 NOTE — ED PROVIDER NOTE - PROGRESS NOTE DETAILS
ROBBI Tee: Paged OB and discussed case, will see pt in ED while R/O ACS. ROBBI Tee: Pt signed out to MD Murry with MD Davalos. Klepfish: initial trop 6, other labs, cxr grossly wnl. Pt well appearing. pain unchanged. BP improving. Awaiting repeat trop, CTA, reassessment. Updated pt. Postpartum 7 weeks. Unlikely postpartum preeclampisa. Klepfish: ct lmtd but no acute pathology. pt currently sleeping. will admit tele dod for futher care (?syncope, H sudden death). resident d/w tele DOD and text paged tele pa

## 2019-04-02 NOTE — ED PROVIDER NOTE - CLINICAL SUMMARY MEDICAL DECISION MAKING FREE TEXT BOX
29 Y/O F presents with CP worse with deep breathing and previously had R arm tingling. Pt neurologically intact on exam EKG concerning for LVH and pt is hypertensive. Discussed case with MD Davalos, pt will be signed out to main ED for further eval.
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
## 2019-04-02 NOTE — ED PROVIDER NOTE - CRANIAL NERVE AND PUPILLARY EXAM
cranial nerves 2-12 intact/tongue is midline/central and peripheral vision intact/peripheral vision intact
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
## 2019-04-02 NOTE — ED PROVIDER NOTE - CARE PLAN
Principal Discharge DX:	Chest pain Principal Discharge DX:	Chest pain  Secondary Diagnosis:	Syncope and collapse  Secondary Diagnosis:	Hypertension

## 2019-04-03 ENCOUNTER — TRANSCRIPTION ENCOUNTER (OUTPATIENT)
Age: 29
End: 2019-04-03

## 2019-04-03 VITALS
TEMPERATURE: 98 F | SYSTOLIC BLOOD PRESSURE: 128 MMHG | OXYGEN SATURATION: 100 % | HEART RATE: 69 BPM | DIASTOLIC BLOOD PRESSURE: 84 MMHG | RESPIRATION RATE: 18 BRPM

## 2019-04-03 DIAGNOSIS — R07.9 CHEST PAIN, UNSPECIFIED: ICD-10-CM

## 2019-04-03 DIAGNOSIS — R55 SYNCOPE AND COLLAPSE: ICD-10-CM

## 2019-04-03 DIAGNOSIS — R07.1 CHEST PAIN ON BREATHING: ICD-10-CM

## 2019-04-03 DIAGNOSIS — Z29.9 ENCOUNTER FOR PROPHYLACTIC MEASURES, UNSPECIFIED: ICD-10-CM

## 2019-04-03 DIAGNOSIS — I24.9 ACUTE ISCHEMIC HEART DISEASE, UNSPECIFIED: ICD-10-CM

## 2019-04-03 DIAGNOSIS — O14.90 UNSPECIFIED PRE-ECLAMPSIA, UNSPECIFIED TRIMESTER: ICD-10-CM

## 2019-04-03 LAB
APTT BLD: 36.1 SEC — SIGNIFICANT CHANGE UP (ref 27.5–36.3)
INR BLD: 1.06 — SIGNIFICANT CHANGE UP (ref 0.88–1.17)
PROTHROM AB SERPL-ACNC: 11.8 SEC — SIGNIFICANT CHANGE UP (ref 9.8–13.1)
TROPONIN T, HIGH SENSITIVITY: < 6 NG/L — SIGNIFICANT CHANGE UP (ref ?–14)

## 2019-04-03 PROCEDURE — 93306 TTE W/DOPPLER COMPLETE: CPT | Mod: 26

## 2019-04-03 PROCEDURE — 71275 CT ANGIOGRAPHY CHEST: CPT | Mod: 26

## 2019-04-03 PROCEDURE — 70450 CT HEAD/BRAIN W/O DYE: CPT | Mod: 26

## 2019-04-03 RX ORDER — LABETALOL HCL 100 MG
200 TABLET ORAL EVERY 8 HOURS
Qty: 0 | Refills: 0 | Status: DISCONTINUED | OUTPATIENT
Start: 2019-04-03 | End: 2019-04-03

## 2019-04-03 RX ORDER — FAMOTIDINE 10 MG/ML
20 INJECTION INTRAVENOUS ONCE
Qty: 0 | Refills: 0 | Status: COMPLETED | OUTPATIENT
Start: 2019-04-03 | End: 2019-04-03

## 2019-04-03 RX ORDER — HEPARIN SODIUM 5000 [USP'U]/ML
5000 INJECTION INTRAVENOUS; SUBCUTANEOUS EVERY 8 HOURS
Qty: 0 | Refills: 0 | Status: DISCONTINUED | OUTPATIENT
Start: 2019-04-03 | End: 2019-04-03

## 2019-04-03 RX ORDER — ACETAMINOPHEN 500 MG
650 TABLET ORAL ONCE
Qty: 0 | Refills: 0 | Status: COMPLETED | OUTPATIENT
Start: 2019-04-03 | End: 2019-04-03

## 2019-04-03 RX ORDER — IBUPROFEN 200 MG
600 TABLET ORAL EVERY 6 HOURS
Qty: 0 | Refills: 0 | Status: DISCONTINUED | OUTPATIENT
Start: 2019-04-03 | End: 2019-04-03

## 2019-04-03 RX ORDER — METHOCARBAMOL 500 MG/1
1500 TABLET, FILM COATED ORAL THREE TIMES A DAY
Qty: 0 | Refills: 0 | Status: DISCONTINUED | OUTPATIENT
Start: 2019-04-03 | End: 2019-04-03

## 2019-04-03 RX ADMIN — Medication 650 MILLIGRAM(S): at 00:46

## 2019-04-03 RX ADMIN — Medication 200 MILLIGRAM(S): at 10:54

## 2019-04-03 RX ADMIN — Medication 162 MILLIGRAM(S): at 13:01

## 2019-04-03 RX ADMIN — FAMOTIDINE 20 MILLIGRAM(S): 10 INJECTION INTRAVENOUS at 00:46

## 2019-04-03 NOTE — CONSULT NOTE ADULT - ASSESSMENT
28F with a history of preeclampsia who is now 7 weeks post-partum who presents to the ED due to chest pain and syncope. Neurological exam unremarkable. She continues to have chest pain. Troponin and EKG were wnl and patient was admitted to cardiology. CTA chest negative for PE. CTH showed no acute abnormalities.     Recommendations:  Telemetry monitoring  Orthostatic vital signs  Echocardiogram  IVF hydration
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
## 2019-04-03 NOTE — H&P ADULT - NEGATIVE ENMT SYMPTOMS
no ear pain/no dysphagia/no throat pain/no hearing difficulty/no tinnitus/no vertigo/no nasal congestion/no nose bleeds

## 2019-04-03 NOTE — H&P ADULT - PROBLEM SELECTOR PROBLEM 4
STREP THROAT INSTRUCTIONS   You have been diagnosed with strep throat. An antibiotic has been prescribed for you today and should be finished entirely, even if you're feeling better.   Probiotics are recommended while taking antibiotics. These can be found in pill form at the pharmacy or in some brands of yogurt.   For sore throat, you should use Ibuprofen, salt water, gargles and throat lozenges. Cool fluids may also ease throat pain. Ibuprofen or Tyleno may also be used for fever. You're considered contagious for 24 hours following starting antibiotics. After 48 hours, you should replace your toothbrush to prevent reinfection.   If you develop shortness of breath, drooling or inability to swallow, call 911 and go to the Emergency Department. Follow up with your primary care provider for worsening or persistent symptoms.   Patient verbalized understanding of plan. Visit Summary given.            Need for prophylactic measure
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
## 2019-04-03 NOTE — H&P ADULT - PROBLEM SELECTOR PLAN 3
Continue Labetalol 200mg  Monitor BP and adjust medications as necessary  GYN consult noted Continue Labetalol 200mg TID  Monitor BP and adjust medications as necessary  GYN consult noted

## 2019-04-03 NOTE — H&P ADULT - NSICDXFAMILYHX_GEN_ALL_CORE_FT
FAMILY HISTORY:  Family history of myocardial infarction, Maternal grandfather  of MI  FH: stroke, Paternal grandmother
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
## 2019-04-03 NOTE — DISCHARGE NOTE NURSING/CASE MANAGEMENT/SOCIAL WORK - NSDCDPATPORTLINK_GEN_ALL_CORE
You can access the [facility] Patient Portal, offered by [facility], by registering with the following website: [email or web address]

## 2019-04-03 NOTE — DISCHARGE NOTE PROVIDER - NSDCCPCAREPLAN_GEN_ALL_CORE_FT
PRINCIPAL DISCHARGE DIAGNOSIS  Diagnosis: Chest pain  Assessment and Plan of Treatment: Please Follow up with Cardiologist Dr Aldrich for further management      SECONDARY DISCHARGE DIAGNOSES  Diagnosis: Hypertension  Assessment and Plan of Treatment: Continue Labetalol    Diagnosis: Syncope and collapse  Assessment and Plan of Treatment: Dicontinue Methocarbamol, keep well hydrated PRINCIPAL DISCHARGE DIAGNOSIS  Diagnosis: Chest pain  Assessment and Plan of Treatment: Please Follow up with Cardiologist Dr Aldrich for further management in 1 week      SECONDARY DISCHARGE DIAGNOSES  Diagnosis: Hypertension  Assessment and Plan of Treatment: Continue Labetalol    Diagnosis: Syncope and collapse  Assessment and Plan of Treatment: Dicontinue Methocarbamol, keep well hydrated
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
## 2019-04-03 NOTE — H&P ADULT - NSHPSOCIALHISTORY_GEN_ALL_CORE
Pt is  with 4 children. Pt is currently a student. Pt admits to occasionally drinking EtOH. Pt does not smoke or use illicit drugs. Pt is post-partum and currently lactating. Pt had PAP smear last year. Pt does not practice self breast exam. Pt states she received her flu shot last year.

## 2019-04-03 NOTE — H&P ADULT - NEUROLOGICAL DETAILS
responds to verbal commands/cranial nerves intact/alert and oriented x 3 cranial nerves intact/responds to verbal commands/sensation intact/alert and oriented x 3
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
## 2019-04-03 NOTE — DISCHARGE NOTE PROVIDER - CARE PROVIDER_API CALL
Matty Aldrich (MD)  Cardiovascular Disease; Internal Medicine  [address] [address]  Phone: [phone]  Fax: [phone]  Follow Up Time:

## 2019-04-03 NOTE — H&P ADULT - PROBLEM SELECTOR PLAN 2
Admit to telemetry  CT head ordered  Check orthostatic vital signs   Serial EKG, Echo  Monitor FBS  Labs Admit to telemetry, monitor for arrhythmia, CT head, Echo, Orthostatics ordered, Call Neuro c/s

## 2019-04-03 NOTE — H&P ADULT - PROBLEM SELECTOR PLAN 1
Admit to telemetry  Continue aspirin 162mg  Give acetaminophen 650mg for pain PRN  CXR and troponin were negative  Pt refused angiogram.   Will get serial EKGs, Echo Admit to telemetry, CXR, CTPA and troponins were negative Give acetaminophen 650mg for pain PRN, start ASA.  Pt refused angiogram. Check Echo. F/U MD note

## 2019-04-03 NOTE — H&P ADULT - RS GEN PE MLT RESP DETAILS PC
chest wall tenderness/breath sounds equal/no wheezes/airway patent/normal/clear to auscultation bilaterally

## 2019-04-03 NOTE — H&P ADULT - NEGATIVE NEUROLOGICAL SYMPTOMS
no tremors/no syncope/no vertigo/no generalized seizures/no loss of consciousness/no hemiparesis/no facial palsy/no focal seizures

## 2019-06-13 PROBLEM — O14.90 UNSPECIFIED PRE-ECLAMPSIA, UNSPECIFIED TRIMESTER: Chronic | Status: ACTIVE | Noted: 2019-04-03

## 2019-06-26 ENCOUNTER — APPOINTMENT (OUTPATIENT)
Dept: UROLOGY | Facility: CLINIC | Age: 29
End: 2019-06-26
Payer: COMMERCIAL

## 2019-06-26 VITALS
BODY MASS INDEX: 33.13 KG/M2 | HEIGHT: 63 IN | SYSTOLIC BLOOD PRESSURE: 120 MMHG | DIASTOLIC BLOOD PRESSURE: 84 MMHG | RESPIRATION RATE: 16 BRPM | HEART RATE: 96 BPM | OXYGEN SATURATION: 97 % | WEIGHT: 187 LBS

## 2019-06-26 DIAGNOSIS — Z80.42 FAMILY HISTORY OF MALIGNANT NEOPLASM OF PROSTATE: ICD-10-CM

## 2019-06-26 PROCEDURE — 99203 OFFICE O/P NEW LOW 30 MIN: CPT

## 2019-06-26 NOTE — LETTER BODY
[Dear  ___] : Dear  [unfilled], [Consult Letter:] : I had the pleasure of evaluating your patient, [unfilled]. [Consult Closing:] : Thank you very much for allowing me to participate in the care of this patient.  If you have any questions, please do not hesitate to contact me. [FreeTextEntry1] : [facility]\par [address] \par [place], [place], [address]  \par [phone] \par

## 2019-06-26 NOTE — PHYSICAL EXAM
[General Appearance - Well Developed] : well developed [General Appearance - Well Nourished] : well nourished [Normal Appearance] : normal appearance [Well Groomed] : well groomed [General Appearance - In No Acute Distress] : no acute distress [Edema] : no peripheral edema [Respiration, Rhythm And Depth] : normal respiratory rhythm and effort [Exaggerated Use Of Accessory Muscles For Inspiration] : no accessory muscle use [Abdomen Soft] : soft [Abdomen Tenderness] : non-tender [Costovertebral Angle Tenderness] : no ~M costovertebral angle tenderness [FreeTextEntry1] : bladder not palpable [Normal Station and Gait] : the gait and station were normal for the patient's age [] : no rash [No Focal Deficits] : no focal deficits [Oriented To Time, Place, And Person] : oriented to person, place, and time [Affect] : the affect was normal [Mood] : the mood was normal [Not Anxious] : not anxious

## 2019-06-26 NOTE — ASSESSMENT
[FreeTextEntry1] : The difference between simple and complex renal cyst were discussed. Bosniak ossification was discussed. She will undergo CT scan with and without contrast for further assessment. Also if there is a small renal stone, it will be evaluated as well. Urinalysis will be sent. She can call me for the results discussed.\par \par Matty Pierre MD, FACS\par [facility]\par  of Urology\par \par [address], [address]\[place] [address]\par \par [address], [address]\[place] [address]\par \par Tel: [phone]\par Fax: [phone]
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
## 2019-06-26 NOTE — REVIEW OF SYSTEMS
[Eyesight Problems] : eyesight problems [Joint Pain] : joint pain [Muscle Weakness] : muscle weakness [Negative] : Heme/Lymph [FreeTextEntry2] : hypertension [FreeTextEntry3] : renal cyst/ pubic pressure

## 2019-06-27 LAB
APPEARANCE: CLEAR
BACTERIA: ABNORMAL
BILIRUBIN URINE: NEGATIVE
BLOOD URINE: ABNORMAL
COLOR: YELLOW
GLUCOSE QUALITATIVE U: NEGATIVE
HYALINE CASTS: 1 /LPF
KETONES URINE: NEGATIVE
LEUKOCYTE ESTERASE URINE: NEGATIVE
MICROSCOPIC-UA: NORMAL
NITRITE URINE: NEGATIVE
PH URINE: 7
PROTEIN URINE: ABNORMAL
RED BLOOD CELLS URINE: 10 /HPF
SPECIFIC GRAVITY URINE: 1.03
SQUAMOUS EPITHELIAL CELLS: 16 /HPF
UROBILINOGEN URINE: NORMAL
WHITE BLOOD CELLS URINE: 5 /HPF

## 2019-07-16 ENCOUNTER — APPOINTMENT (OUTPATIENT)
Dept: ENDOCRINOLOGY | Facility: CLINIC | Age: 29
End: 2019-07-16
Payer: COMMERCIAL

## 2019-07-16 VITALS
BODY MASS INDEX: 33.66 KG/M2 | OXYGEN SATURATION: 98 % | DIASTOLIC BLOOD PRESSURE: 80 MMHG | WEIGHT: 190 LBS | HEIGHT: 63 IN | SYSTOLIC BLOOD PRESSURE: 120 MMHG | HEART RATE: 90 BPM

## 2019-07-16 PROCEDURE — 99244 OFF/OP CNSLTJ NEW/EST MOD 40: CPT

## 2019-07-16 NOTE — PHYSICAL EXAM
[Alert] : alert [No Acute Distress] : no acute distress [Well Nourished] : well nourished [Well Developed] : well developed [Normal Sclera/Conjunctiva] : normal sclera/conjunctiva [PERRL] : pupils equal, round and reactive to light [EOMI] : extra ocular movement intact [No Proptosis] : no proptosis [Normal Outer Ear/Nose] : the ears and nose were normal in appearance [Normal TMs] : both tympanic membranes were normal [Normal Hearing] : hearing was normal [No Neck Mass] : no neck mass was observed [Supple] : the neck was supple [Thyroid Not Enlarged] : the thyroid was not enlarged [No Respiratory Distress] : no respiratory distress [Normal Rate and Effort] : normal respiratory rhythm and effort [Clear to Auscultation] : lungs were clear to auscultation bilaterally [Normal Rate] : heart rate was normal  [Normal S1, S2] : normal S1 and S2 [Regular Rhythm] : with a regular rhythm [Normal Bowel Sounds] : normal bowel sounds [Not Tender] : non-tender [Soft] : abdomen soft [Not Distended] : not distended [Normal Gait] : normal gait [No Joint Swelling] : no joint swelling seen [No Clubbing, Cyanosis] : no clubbing  or cyanosis of the fingernails [Normal Strength/Tone] : muscle strength and tone were normal [No Rash] : no rash [Foot Ulcers] : no foot ulcers [No Skin Lesions] : no skin lesions [Acne] : no acne [Normal Reflexes] : deep tendon reflexes were 2+ and symmetric [No Motor Deficits] : the motor exam was normal [No Tremors] : no tremors [Oriented x3] : oriented to person, place, and time [Normal Insight/Judgement] : insight and judgment were intact [Normal Affect] : the affect was normal [Normal Mood] : the mood was normal

## 2019-07-16 NOTE — REVIEW OF SYSTEMS
[Fatigue] : no fatigue [Decreased Appetite] : appetite not decreased [Recent Weight Gain (___ Lbs)] : no recent weight gain [Recent Weight Loss (___ Lbs)] : no recent weight loss [Visual Field Defect] : no visual field defect [Blurry Vision] : no blurred vision [Dry Eyes] : no dryness of the eyes [Eyes Itch] : no itching of the eyes [Dysphagia] : no dysphagia [Dysphonia] : no dysphonia [Neck Pain] : no neck pain [Chest Pain] : no chest pain [Palpitations] : no palpitations [Heart Rate Is Slow] : the heart rate was not slow [Heart Rate Is Fast] : the heart rate was not fast [Shortness Of Breath] : no shortness of breath [Wheezing] : no wheezing was heard [Cough] : no cough [SOB on Exertion] : no shortness of breath during exertion [Nausea] : no nausea [Vomiting] : no vomiting was observed [Polyuria] : no polyuria [Irregular Menses] : regular menses [Joint Pain] : no joint pain [Joint Stiffness] : no joint stiffness [Muscle Weakness] : no muscle weakness [Muscle Cramps] : no muscle cramps [Acanthosis] : no acanthosis  [Hirsutism] : no hirsutism [Headache] : no headaches [Tremors] : no tremors [Dizziness] : no dizziness [Pain/Numbness of Digits] : no pain/numbness of digits [Depression] : no depression [Anxiety] : no anxiety [Polydipsia] : no polydipsia [Galactorrhea] : no galactorrhea  [Cold Intolerance] : cold tolerant [Heat Intolerance] : heat tolerant [Easy Bleeding] : no ~M tendency for easy bleeding [Easy Bruising] : no tendency for easy bruising [Swelling] : no swelling

## 2019-07-16 NOTE — ASSESSMENT
[FreeTextEntry1] : 28 year old female with history of multiple thyroid nodules which were discovered incidentally on neck MRI. \par Appearance of the nodules are spongiform and cystic, most of them <1 cm. At this time, do not warrant FNA. \par Will check baseline TFTs. \par Thyroid parenchyma with appearance of heterogeneity. \par At this time, will check for underlying thyroid disease given ultrasound appearance and strong family history. \par \par -Follow up in one year for ultrasound

## 2019-07-17 ENCOUNTER — APPOINTMENT (OUTPATIENT)
Dept: CT IMAGING | Facility: CLINIC | Age: 29
End: 2019-07-17

## 2019-07-19 ENCOUNTER — OTHER (OUTPATIENT)
Age: 29
End: 2019-07-19

## 2019-07-22 LAB
T3 SERPL-MCNC: 264 NG/DL
T4 FREE SERPL-MCNC: 2.5 NG/DL
THYROGLOB AB SERPL-ACNC: <20 IU/ML
THYROPEROXIDASE AB SERPL IA-ACNC: <10 IU/ML
TSH SERPL-ACNC: <0.01 UIU/ML
TSI ACT/NOR SER: 0.49 IU/L

## 2019-07-30 ENCOUNTER — OUTPATIENT (OUTPATIENT)
Dept: OUTPATIENT SERVICES | Facility: HOSPITAL | Age: 29
LOS: 1 days | End: 2019-07-30
Payer: COMMERCIAL

## 2019-07-30 ENCOUNTER — FORM ENCOUNTER (OUTPATIENT)
Age: 29
End: 2019-07-30

## 2019-07-30 ENCOUNTER — APPOINTMENT (OUTPATIENT)
Dept: NUCLEAR MEDICINE | Facility: HOSPITAL | Age: 29
End: 2019-07-30
Payer: COMMERCIAL

## 2019-07-30 DIAGNOSIS — E04.2 NONTOXIC MULTINODULAR GOITER: ICD-10-CM

## 2019-07-30 DIAGNOSIS — Z00.00 ENCOUNTER FOR GENERAL ADULT MEDICAL EXAMINATION WITHOUT ABNORMAL FINDINGS: ICD-10-CM

## 2019-07-30 DIAGNOSIS — Z98.89 OTHER SPECIFIED POSTPROCEDURAL STATES: Chronic | ICD-10-CM

## 2019-07-30 DIAGNOSIS — O03.9 COMPLETE OR UNSPECIFIED SPONTANEOUS ABORTION WITHOUT COMPLICATION: Chronic | ICD-10-CM

## 2019-07-31 ENCOUNTER — APPOINTMENT (OUTPATIENT)
Dept: NUCLEAR MEDICINE | Facility: HOSPITAL | Age: 29
End: 2019-07-31

## 2019-07-31 PROCEDURE — 78014 THYROID IMAGING W/BLOOD FLOW: CPT | Mod: 26

## 2019-07-31 PROCEDURE — 78014 THYROID IMAGING W/BLOOD FLOW: CPT

## 2019-07-31 PROCEDURE — A9516: CPT

## 2019-08-19 ENCOUNTER — FORM ENCOUNTER (OUTPATIENT)
Age: 29
End: 2019-08-19

## 2019-08-20 ENCOUNTER — APPOINTMENT (OUTPATIENT)
Dept: CT IMAGING | Facility: CLINIC | Age: 29
End: 2019-08-20
Payer: COMMERCIAL

## 2019-08-20 ENCOUNTER — OUTPATIENT (OUTPATIENT)
Dept: OUTPATIENT SERVICES | Facility: HOSPITAL | Age: 29
LOS: 1 days | End: 2019-08-20
Payer: COMMERCIAL

## 2019-08-20 DIAGNOSIS — O03.9 COMPLETE OR UNSPECIFIED SPONTANEOUS ABORTION WITHOUT COMPLICATION: Chronic | ICD-10-CM

## 2019-08-20 DIAGNOSIS — Z00.8 ENCOUNTER FOR OTHER GENERAL EXAMINATION: ICD-10-CM

## 2019-08-20 DIAGNOSIS — Z98.89 OTHER SPECIFIED POSTPROCEDURAL STATES: Chronic | ICD-10-CM

## 2019-08-20 PROCEDURE — 74178 CT ABD&PLV WO CNTR FLWD CNTR: CPT | Mod: 26

## 2019-08-20 PROCEDURE — 74178 CT ABD&PLV WO CNTR FLWD CNTR: CPT

## 2019-08-21 ENCOUNTER — MESSAGE (OUTPATIENT)
Age: 29
End: 2019-08-21

## 2019-09-24 ENCOUNTER — APPOINTMENT (OUTPATIENT)
Dept: GASTROENTEROLOGY | Facility: CLINIC | Age: 29
End: 2019-09-24
Payer: COMMERCIAL

## 2019-09-24 VITALS
HEART RATE: 68 BPM | DIASTOLIC BLOOD PRESSURE: 70 MMHG | HEIGHT: 63 IN | OXYGEN SATURATION: 99 % | TEMPERATURE: 98.6 F | SYSTOLIC BLOOD PRESSURE: 120 MMHG | BODY MASS INDEX: 32.43 KG/M2 | WEIGHT: 183 LBS

## 2019-09-24 DIAGNOSIS — Z86.79 PERSONAL HISTORY OF OTHER DISEASES OF THE CIRCULATORY SYSTEM: ICD-10-CM

## 2019-09-24 PROCEDURE — 99204 OFFICE O/P NEW MOD 45 MIN: CPT

## 2019-09-24 RX ORDER — IBUPROFEN 600 MG/1
600 TABLET, FILM COATED ORAL
Qty: 12 | Refills: 0 | Status: COMPLETED | COMMUNITY
Start: 2019-03-20 | End: 2019-09-24

## 2019-09-24 RX ORDER — METHOCARBAMOL 750 MG/1
750 TABLET, FILM COATED ORAL
Qty: 30 | Refills: 0 | Status: DISCONTINUED | COMMUNITY
Start: 2019-03-20 | End: 2019-09-24

## 2019-09-24 NOTE — REVIEW OF SYSTEMS
[As Noted in HPI] : as noted in HPI [Abdominal Pain] : no abdominal pain [Vomiting] : no vomiting [Constipation] : constipation [Diarrhea] : no diarrhea [Heartburn] : no heartburn [Melena] : no melena [Negative] : Heme/Lymph

## 2019-09-24 NOTE — PHYSICAL EXAM
[General Appearance - Alert] : alert [General Appearance - In No Acute Distress] : in no acute distress [Sclera] : the sclera and conjunctiva were normal [PERRL With Normal Accommodation] : pupils were equal in size, round, and reactive to light [Extraocular Movements] : extraocular movements were intact [Oropharynx] : the oropharynx was normal [Outer Ear] : the ears and nose were normal in appearance [Neck Cervical Mass (___cm)] : no neck mass was observed [Neck Appearance] : the appearance of the neck was normal [Jugular Venous Distention Increased] : there was no jugular-venous distention [Thyroid Diffuse Enlargement] : the thyroid was not enlarged [Thyroid Nodule] : there were no palpable thyroid nodules [Heart Rate And Rhythm] : heart rate was normal and rhythm regular [Auscultation Breath Sounds / Voice Sounds] : lungs were clear to auscultation bilaterally [Heart Sounds] : normal S1 and S2 [Heart Sounds Gallop] : no gallops [Murmurs] : no murmurs [Bowel Sounds] : normal bowel sounds [Heart Sounds Pericardial Friction Rub] : no pericardial rub [Abdomen Soft] : soft [Abdomen Tenderness] : non-tender [] : no hepato-splenomegaly [Abdomen Mass (___ Cm)] : no abdominal mass palpated [Normal Sphincter Tone] : normal sphincter tone [No Rectal Mass] : no rectal mass [External Hemorrhoid] : external hemorrhoids [Occult Blood Positive] : stool was negative for occult blood [Cervical Lymph Nodes Enlarged Posterior Bilaterally] : posterior cervical [Supraclavicular Lymph Nodes Enlarged Bilaterally] : supraclavicular [Cervical Lymph Nodes Enlarged Anterior Bilaterally] : anterior cervical [No CVA Tenderness] : no ~M costovertebral angle tenderness [No Spinal Tenderness] : no spinal tenderness [Nail Clubbing] : no clubbing  or cyanosis of the fingernails [Abnormal Walk] : normal gait [Musculoskeletal - Swelling] : no joint swelling seen [Motor Tone] : muscle strength and tone were normal [Deep Tendon Reflexes (DTR)] : deep tendon reflexes were 2+ and symmetric [Sensation] : the sensory exam was normal to light touch and pinprick [No Focal Deficits] : no focal deficits [Impaired Insight] : insight and judgment were intact [Oriented To Time, Place, And Person] : oriented to person, place, and time [Affect] : the affect was normal
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
## 2019-10-02 NOTE — PATIENT PROFILE ADULT - ANY SIGNIFICANT CHANGE IN ABILITY TO PERFORM THE FOLLOWING ADL SINCE THE ONSET OF PRESENT ILLNESS?
[>50% of Time Spent on Counseling and Coordination of Care for  ___] : Greater than 50% of the encounter time was spent on counseling and coordination of care for [unfilled] [Time Spent: ___ minutes] : I have spent [unfilled] minutes of face to face time with the patient [FreeTextEntry3] : All medical record entries made by the Scribe were at my, Dr. Shawn Guillen's direction and personally dictated by me.   I have reviewed the chart and agree that the record accurately reflects my personal performance of the history, physical exam, assessment and plan. I have also personally directed, reviewed, and agreed with the chart. no

## 2020-01-27 ENCOUNTER — RX RENEWAL (OUTPATIENT)
Age: 30
End: 2020-01-27

## 2020-04-25 ENCOUNTER — MESSAGE (OUTPATIENT)
Age: 30
End: 2020-04-25

## 2020-05-16 LAB
SARS-COV-2 IGG SERPL IA-ACNC: <0.1 INDEX
SARS-COV-2 IGG SERPL QL IA: NEGATIVE

## 2020-07-27 ENCOUNTER — RX RENEWAL (OUTPATIENT)
Age: 30
End: 2020-07-27

## 2020-12-22 NOTE — ED ADULT NURSE NOTE - TEMPERATURE IN FAHRENHEIT (DEGREES F)
99 Orbicularis Oris Muscle Flap Text: The defect edges were debeveled with a #15 scalpel blade.  Given that the defect affected the competency of the oral sphincter an obicularis oris muscle flap was deemed most appropriate to restore this competency and normal muscle function.  Using a sterile surgical marker, an appropriate flap was drawn incorporating the defect. The area thus outlined was incised with a #15 scalpel blade.

## 2021-02-23 ENCOUNTER — EMERGENCY (EMERGENCY)
Facility: HOSPITAL | Age: 31
LOS: 1 days | Discharge: HOME | End: 2021-02-23
Admitting: EMERGENCY MEDICINE
Payer: COMMERCIAL

## 2021-02-23 VITALS
RESPIRATION RATE: 16 BRPM | HEIGHT: 63 IN | SYSTOLIC BLOOD PRESSURE: 146 MMHG | TEMPERATURE: 97 F | DIASTOLIC BLOOD PRESSURE: 95 MMHG | OXYGEN SATURATION: 100 % | HEART RATE: 91 BPM

## 2021-02-23 DIAGNOSIS — O03.9 COMPLETE OR UNSPECIFIED SPONTANEOUS ABORTION WITHOUT COMPLICATION: Chronic | ICD-10-CM

## 2021-02-23 DIAGNOSIS — Z98.89 OTHER SPECIFIED POSTPROCEDURAL STATES: Chronic | ICD-10-CM

## 2021-02-23 PROCEDURE — 99284 EMERGENCY DEPT VISIT MOD MDM: CPT

## 2021-02-23 NOTE — ED ADULT TRIAGE NOTE - CHIEF COMPLAINT QUOTE
c/o sore throat and left sided neck pain/swelling x a few hours. airway patent. also has been coughing a lot at night x 3 months. denies fever/chills, sob, chest pain. denies sick contacts. hx hyperthyroidism and hypertension, not on medications.

## 2021-02-24 LAB
SARS-COV-2 RNA SPEC QL NAA+PROBE: SIGNIFICANT CHANGE UP
T3 SERPL-MCNC: 140 NG/DL — SIGNIFICANT CHANGE UP (ref 80–200)
T3FREE SERPL-MCNC: 3.75 PG/ML — SIGNIFICANT CHANGE UP (ref 1.8–4.6)
T4 AB SER-ACNC: 9.46 UG/DL — SIGNIFICANT CHANGE UP (ref 5.1–13)
TSH SERPL-MCNC: <0.1 UIU/ML — LOW (ref 0.27–4.2)

## 2021-02-24 RX ORDER — IBUPROFEN 200 MG
600 TABLET ORAL ONCE
Refills: 0 | Status: COMPLETED | OUTPATIENT
Start: 2021-02-24 | End: 2021-02-24

## 2021-02-24 RX ADMIN — Medication 600 MILLIGRAM(S): at 01:17

## 2021-02-24 NOTE — ED PROVIDER NOTE - OBJECTIVE STATEMENT
29 y/o female pmh hyperthyroidism (non compliant with methimazole), c/o throat pain x1 day. Pt admits to pain with swallowing. Pt states that today she started to have some L sided neck pain as well. Admits to non productive cough. Pt denies chest pain, sob, difficulty swallowing, n/v/d, dizziness, syncope, fever or chills.  Of note, pt has not been taking her methimazole and has not had blood work in 1 year.

## 2021-02-24 NOTE — ED PROVIDER NOTE - CLINICAL SUMMARY MEDICAL DECISION MAKING FREE TEXT BOX
29 y/o female w/ sore throat and cough- will check covid, motrin, check thyroid levels. f/u with endo

## 2021-02-24 NOTE — ED PROVIDER NOTE - PATIENT PORTAL LINK FT
You can access the FollowMyHealth Patient Portal offered by [facility] by registering at the following website: [email or web address]. By joining [employer]’s FollowMyHealth portal, you will also be able to view your health information using other applications (apps) compatible with our system.

## 2021-03-07 ENCOUNTER — EMERGENCY (EMERGENCY)
Facility: HOSPITAL | Age: 31
LOS: 1 days | Discharge: HOME | End: 2021-03-07
Attending: EMERGENCY MEDICINE | Admitting: EMERGENCY MEDICINE
Payer: COMMERCIAL

## 2021-03-07 VITALS
TEMPERATURE: 98 F | DIASTOLIC BLOOD PRESSURE: 111 MMHG | SYSTOLIC BLOOD PRESSURE: 150 MMHG | RESPIRATION RATE: 17 BRPM | HEART RATE: 73 BPM | OXYGEN SATURATION: 100 %

## 2021-03-07 VITALS
HEIGHT: 63 IN | OXYGEN SATURATION: 100 % | SYSTOLIC BLOOD PRESSURE: 152 MMHG | TEMPERATURE: 98 F | HEART RATE: 87 BPM | DIASTOLIC BLOOD PRESSURE: 107 MMHG | RESPIRATION RATE: 16 BRPM

## 2021-03-07 DIAGNOSIS — O03.9 COMPLETE OR UNSPECIFIED SPONTANEOUS ABORTION WITHOUT COMPLICATION: Chronic | ICD-10-CM

## 2021-03-07 DIAGNOSIS — Z98.89 OTHER SPECIFIED POSTPROCEDURAL STATES: Chronic | ICD-10-CM

## 2021-03-07 LAB
ALBUMIN SERPL ELPH-MCNC: 4 G/DL — SIGNIFICANT CHANGE UP (ref 3.3–5)
ALP SERPL-CCNC: 65 U/L — SIGNIFICANT CHANGE UP (ref 40–120)
ALT FLD-CCNC: 9 U/L — SIGNIFICANT CHANGE UP (ref 4–33)
ANION GAP SERPL CALC-SCNC: 14 MMOL/L — SIGNIFICANT CHANGE UP (ref 7–14)
AST SERPL-CCNC: 15 U/L — SIGNIFICANT CHANGE UP (ref 4–32)
BASOPHILS # BLD AUTO: 0.1 K/UL — SIGNIFICANT CHANGE UP (ref 0–0.2)
BASOPHILS NFR BLD AUTO: 1.9 % — SIGNIFICANT CHANGE UP (ref 0–2)
BILIRUB SERPL-MCNC: <0.2 MG/DL — SIGNIFICANT CHANGE UP (ref 0.2–1.2)
BUN SERPL-MCNC: 15 MG/DL — SIGNIFICANT CHANGE UP (ref 7–23)
CALCIUM SERPL-MCNC: 9.3 MG/DL — SIGNIFICANT CHANGE UP (ref 8.4–10.5)
CHLORIDE SERPL-SCNC: 104 MMOL/L — SIGNIFICANT CHANGE UP (ref 98–107)
CO2 SERPL-SCNC: 20 MMOL/L — LOW (ref 22–31)
CREAT SERPL-MCNC: 0.83 MG/DL — SIGNIFICANT CHANGE UP (ref 0.5–1.3)
D DIMER BLD IA.RAPID-MCNC: <150 NG/ML DDU — SIGNIFICANT CHANGE UP
EOSINOPHIL # BLD AUTO: 0.21 K/UL — SIGNIFICANT CHANGE UP (ref 0–0.5)
EOSINOPHIL NFR BLD AUTO: 3.9 % — SIGNIFICANT CHANGE UP (ref 0–6)
GLUCOSE SERPL-MCNC: 87 MG/DL — SIGNIFICANT CHANGE UP (ref 70–99)
HCT VFR BLD CALC: 36.1 % — SIGNIFICANT CHANGE UP (ref 34.5–45)
HGB BLD-MCNC: 11.6 G/DL — SIGNIFICANT CHANGE UP (ref 11.5–15.5)
IANC: 1.67 K/UL — SIGNIFICANT CHANGE UP (ref 1.5–8.5)
IMM GRANULOCYTES NFR BLD AUTO: 0.2 % — SIGNIFICANT CHANGE UP (ref 0–1.5)
LYMPHOCYTES # BLD AUTO: 2.83 K/UL — SIGNIFICANT CHANGE UP (ref 1–3.3)
LYMPHOCYTES # BLD AUTO: 52.7 % — HIGH (ref 13–44)
MCHC RBC-ENTMCNC: 28.1 PG — SIGNIFICANT CHANGE UP (ref 27–34)
MCHC RBC-ENTMCNC: 32.1 GM/DL — SIGNIFICANT CHANGE UP (ref 32–36)
MCV RBC AUTO: 87.4 FL — SIGNIFICANT CHANGE UP (ref 80–100)
MONOCYTES # BLD AUTO: 0.55 K/UL — SIGNIFICANT CHANGE UP (ref 0–0.9)
MONOCYTES NFR BLD AUTO: 10.2 % — SIGNIFICANT CHANGE UP (ref 2–14)
NEUTROPHILS # BLD AUTO: 1.67 K/UL — LOW (ref 1.8–7.4)
NEUTROPHILS NFR BLD AUTO: 31.1 % — LOW (ref 43–77)
NRBC # BLD: 0 /100 WBCS — SIGNIFICANT CHANGE UP
NRBC # FLD: 0 K/UL — SIGNIFICANT CHANGE UP
PLATELET # BLD AUTO: 310 K/UL — SIGNIFICANT CHANGE UP (ref 150–400)
POTASSIUM SERPL-MCNC: 3.7 MMOL/L — SIGNIFICANT CHANGE UP (ref 3.5–5.3)
POTASSIUM SERPL-SCNC: 3.7 MMOL/L — SIGNIFICANT CHANGE UP (ref 3.5–5.3)
PROT SERPL-MCNC: 7.3 G/DL — SIGNIFICANT CHANGE UP (ref 6–8.3)
RBC # BLD: 4.13 M/UL — SIGNIFICANT CHANGE UP (ref 3.8–5.2)
RBC # FLD: 12.9 % — SIGNIFICANT CHANGE UP (ref 10.3–14.5)
SODIUM SERPL-SCNC: 138 MMOL/L — SIGNIFICANT CHANGE UP (ref 135–145)
TROPONIN T, HIGH SENSITIVITY RESULT: <6 NG/L — SIGNIFICANT CHANGE UP
WBC # BLD: 5.37 K/UL — SIGNIFICANT CHANGE UP (ref 3.8–10.5)
WBC # FLD AUTO: 5.37 K/UL — SIGNIFICANT CHANGE UP (ref 3.8–10.5)

## 2021-03-07 PROCEDURE — 93010 ELECTROCARDIOGRAM REPORT: CPT

## 2021-03-07 PROCEDURE — 93308 TTE F-UP OR LMTD: CPT | Mod: 26

## 2021-03-07 PROCEDURE — 71046 X-RAY EXAM CHEST 2 VIEWS: CPT | Mod: 26

## 2021-03-07 PROCEDURE — 70360 X-RAY EXAM OF NECK: CPT | Mod: 26

## 2021-03-07 PROCEDURE — 99285 EMERGENCY DEPT VISIT HI MDM: CPT | Mod: 25

## 2021-03-07 NOTE — ED ADULT TRIAGE NOTE - CHIEF COMPLAINT QUOTE
Midsternal chest pain x 3 days intermittently with SOB, chest pain worsens with deep inspirations. No pmh, NKDA - respirations even, unlabored in triage

## 2021-03-07 NOTE — ED ADULT NURSE NOTE - OBJECTIVE STATEMENT
Received patient ot intake for chest pain. 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 Received patient ot intake for chest pain. 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 Breathing equal and unlabored, appears in no acute distress at this time. ROBBI Berry at bedside, left 20G AC placed, labs sent.

## 2021-03-07 NOTE — ED PROVIDER NOTE - ATTENDING CONTRIBUTION TO CARE
30F p/w few weeks of sore throat, then developing CP and SOB.  Had 2 neg covid tests recently.  Pleuritic CP, worse on exertion.  Pt on OCP.  Appt with pulm later this month.  (+)sick contact is RT in this hospital.  Pt appears anxious. Plan check labs, dimer, trop, bedside echo, reass.  VS benign except for HTN.  pMHX HTN, graves dz (off methimazole).  EKG SR at 81 no sunni no std.  twi V2 - poss lead placement.   qtc 406.  (+)LVH.  Pt in no distress - will check labs, EKG, dimer r/o PE, CE r/o myocarditis.  Recently was here and TSH was very low - likely hyperthyroid / graves dz - advised to follow up with endo for medical management.  Pt states stopped methimazole because it was making her nauseous.  Hyperthyroid likely contributor to sx.  Follow up with medical doctor for BP control also.  Left ambulatory in no distress.  VS:  unremarkable except HTN    GEN - NAD; malaise;   A+O x3   HEAD - NC/AT     ENT - PEERL, EOMI, mucous membranes    moist , no discharge      NECK: Neck supple, non-tender without lymphadenopathy, no masses, no JVD  PULM - CTA b/l,  symmetric breath sounds  COR -  normal heart sounds    ABD - , ND, NT, soft,  BACK - no CVA tenderness, nontender spine     EXTREMS - no edema, no deformity, warm and well perfused    SKIN - no rash    or bruising      NEUROLOGIC - alert, face symmetric, speech fluent, sensation nl, motor no focal deficit.

## 2021-03-07 NOTE — ED PROVIDER NOTE - OBJECTIVE STATEMENT
31 y/o female pmh graves disease( does not take prescribed methimazole x 1 year, htn c/o chest pain and sob x3-4 days. Pt admits to left sided pain ,worse with exertion and inspiration. Pt also admits to mild sob associated with chest pain. Pt states that symptoms started about 2 weeks ago with throat irritation and has gradually progressed. 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 Pt also has been tested for covid twice in the last few weeks.

## 2021-04-17 ENCOUNTER — TRANSCRIPTION ENCOUNTER (OUTPATIENT)
Age: 31
End: 2021-04-17

## 2021-05-24 ENCOUNTER — ASOB RESULT (OUTPATIENT)
Age: 31
End: 2021-05-24

## 2021-05-24 ENCOUNTER — APPOINTMENT (OUTPATIENT)
Dept: OBGYN | Facility: CLINIC | Age: 31
End: 2021-05-24
Payer: COMMERCIAL

## 2021-05-24 VITALS
DIASTOLIC BLOOD PRESSURE: 92 MMHG | HEIGHT: 63 IN | BODY MASS INDEX: 34.02 KG/M2 | SYSTOLIC BLOOD PRESSURE: 148 MMHG | WEIGHT: 192 LBS

## 2021-05-24 DIAGNOSIS — Z34.93 ENCOUNTER FOR SUPERVISION OF NORMAL PREGNANCY, UNSPECIFIED, THIRD TRIMESTER: ICD-10-CM

## 2021-05-24 DIAGNOSIS — Z32.00 ENCOUNTER FOR PREGNANCY TEST, RESULT UNKNOWN: ICD-10-CM

## 2021-05-24 DIAGNOSIS — Z34.81 ENCOUNTER FOR SUPERVISION OF OTHER NORMAL PREGNANCY, FIRST TRIMESTER: ICD-10-CM

## 2021-05-24 DIAGNOSIS — Z87.448 PERSONAL HISTORY OF OTHER DISEASES OF URINARY SYSTEM: ICD-10-CM

## 2021-05-24 DIAGNOSIS — K62.89 OTHER SPECIFIED DISEASES OF ANUS AND RECTUM: ICD-10-CM

## 2021-05-24 DIAGNOSIS — Z34.82 ENCOUNTER FOR SUPERVISION OF OTHER NORMAL PREGNANCY, SECOND TRIMESTER: ICD-10-CM

## 2021-05-24 DIAGNOSIS — R19.5 OTHER FECAL ABNORMALITIES: ICD-10-CM

## 2021-05-24 DIAGNOSIS — A74.9 CHLAMYDIAL INFECTION, UNSPECIFIED: ICD-10-CM

## 2021-05-24 DIAGNOSIS — Z23 ENCOUNTER FOR IMMUNIZATION: ICD-10-CM

## 2021-05-24 DIAGNOSIS — Z87.19 PERSONAL HISTORY OF OTHER DISEASES OF THE DIGESTIVE SYSTEM: ICD-10-CM

## 2021-05-24 DIAGNOSIS — K64.4 RESIDUAL HEMORRHOIDAL SKIN TAGS: ICD-10-CM

## 2021-05-24 PROCEDURE — 99072 ADDL SUPL MATRL&STAF TM PHE: CPT

## 2021-05-24 PROCEDURE — 99203 OFFICE O/P NEW LOW 30 MIN: CPT

## 2021-05-24 PROCEDURE — 81025 URINE PREGNANCY TEST: CPT

## 2021-05-24 PROCEDURE — 76817 TRANSVAGINAL US OBSTETRIC: CPT

## 2021-05-24 RX ORDER — HYDROCORTISONE 25 MG/G
2.5 CREAM TOPICAL TWICE DAILY
Qty: 1 | Refills: 2 | Status: COMPLETED | COMMUNITY
Start: 2019-09-24 | End: 2021-05-24

## 2021-05-24 RX ORDER — ATENOLOL 25 MG/1
25 TABLET ORAL
Qty: 15 | Refills: 0 | Status: COMPLETED | COMMUNITY
Start: 2019-07-22 | End: 2021-05-24

## 2021-05-24 RX ORDER — ONDANSETRON 8 MG/1
8 TABLET ORAL
Qty: 9 | Refills: 0 | Status: COMPLETED | COMMUNITY
Start: 2021-05-10

## 2021-05-24 RX ORDER — LEVONORGESTREL AND ETHINYL ESTRADIOL 0.15-0.03
0.15-3 KIT ORAL
Qty: 28 | Refills: 0 | Status: COMPLETED | COMMUNITY
Start: 2020-11-19

## 2021-05-24 RX ORDER — SODIUM SULFATE, POTASSIUM SULFATE, MAGNESIUM SULFATE 17.5; 3.13; 1.6 G/ML; G/ML; G/ML
17.5-3.13-1.6 SOLUTION, CONCENTRATE ORAL
Qty: 1 | Refills: 0 | Status: COMPLETED | COMMUNITY
Start: 2019-09-24 | End: 2021-05-24

## 2021-05-24 RX ORDER — CLOTRIMAZOLE 10 MG/G
1 CREAM TOPICAL
Qty: 15 | Refills: 0 | Status: COMPLETED | COMMUNITY
Start: 2021-04-17

## 2021-05-24 RX ORDER — MISOPROSTOL 200 UG/1
200 TABLET ORAL
Qty: 4 | Refills: 0 | Status: COMPLETED | COMMUNITY
Start: 2021-04-27

## 2021-05-24 RX ORDER — METHIMAZOLE 5 MG/1
5 TABLET ORAL
Qty: 90 | Refills: 0 | Status: COMPLETED | COMMUNITY
Start: 2019-08-01 | End: 2021-05-24

## 2021-05-24 RX ORDER — NITROFURANTOIN (MONOHYDRATE/MACROCRYSTALS) 25; 75 MG/1; MG/1
100 CAPSULE ORAL
Qty: 14 | Refills: 0 | Status: COMPLETED | COMMUNITY
Start: 2021-05-09

## 2021-05-24 RX ORDER — PRENATAL VIT 49/IRON FUM/FOLIC 6.75-0.2MG
TABLET ORAL
Refills: 0 | Status: ACTIVE | COMMUNITY

## 2021-05-24 NOTE — HISTORY OF PRESENT ILLNESS
[TextBox_4] : Pt presents with +ucg LMP 3/15/21\par Pt c/o nausea - states she has had to take zofran.  Happens at 3am when she works nights or when shes home when she wakes up.  Gags a lot but no vomiting.\par Took macrobid recently for a uti\par Pt is a p4 - pt states she went to family planning to terminate the pregnancy and didn’t follow through  [PapSmeardate] : 11/20

## 2021-05-27 LAB
C TRACH RRNA SPEC QL NAA+PROBE: NOT DETECTED
HCG UR QL: POSITIVE
N GONORRHOEA RRNA SPEC QL NAA+PROBE: NOT DETECTED
QUALITY CONTROL: YES
SOURCE AMPLIFICATION: NORMAL
SOURCE AMPLIFICATION: NORMAL
T VAGINALIS RRNA SPEC QL NAA+PROBE: NOT DETECTED

## 2021-06-02 ENCOUNTER — LABORATORY RESULT (OUTPATIENT)
Age: 31
End: 2021-06-02

## 2021-06-03 ENCOUNTER — APPOINTMENT (OUTPATIENT)
Dept: ANTEPARTUM | Facility: CLINIC | Age: 31
End: 2021-06-03
Payer: COMMERCIAL

## 2021-06-03 ENCOUNTER — ASOB RESULT (OUTPATIENT)
Age: 31
End: 2021-06-03

## 2021-06-03 PROCEDURE — 36416 COLLJ CAPILLARY BLOOD SPEC: CPT

## 2021-06-03 PROCEDURE — 76801 OB US < 14 WKS SINGLE FETUS: CPT

## 2021-06-03 PROCEDURE — 99072 ADDL SUPL MATRL&STAF TM PHE: CPT

## 2021-06-03 PROCEDURE — 76813 OB US NUCHAL MEAS 1 GEST: CPT

## 2021-06-17 ENCOUNTER — NON-APPOINTMENT (OUTPATIENT)
Age: 31
End: 2021-06-17

## 2021-06-17 ENCOUNTER — APPOINTMENT (OUTPATIENT)
Dept: OBGYN | Facility: CLINIC | Age: 31
End: 2021-06-17
Payer: COMMERCIAL

## 2021-06-17 VITALS
WEIGHT: 195 LBS | HEIGHT: 63 IN | BODY MASS INDEX: 34.55 KG/M2 | DIASTOLIC BLOOD PRESSURE: 88 MMHG | SYSTOLIC BLOOD PRESSURE: 136 MMHG

## 2021-06-17 LAB
BASOPHILS # BLD AUTO: 0.09 K/UL
BASOPHILS NFR BLD AUTO: 1.2 %
EOSINOPHIL # BLD AUTO: 0.32 K/UL
EOSINOPHIL NFR BLD AUTO: 4.4 %
HCT VFR BLD CALC: 34.2 %
HGB BLD-MCNC: 11.3 G/DL
IMM GRANULOCYTES NFR BLD AUTO: 0.7 %
LYMPHOCYTES # BLD AUTO: 2.83 K/UL
LYMPHOCYTES NFR BLD AUTO: 38.8 %
MAN DIFF?: NORMAL
MCHC RBC-ENTMCNC: 28.5 PG
MCHC RBC-ENTMCNC: 33 GM/DL
MCV RBC AUTO: 86.4 FL
MONOCYTES # BLD AUTO: 0.56 K/UL
MONOCYTES NFR BLD AUTO: 7.7 %
NEUTROPHILS # BLD AUTO: 3.45 K/UL
NEUTROPHILS NFR BLD AUTO: 47.2 %
PLATELET # BLD AUTO: 282 K/UL
RBC # BLD: 3.96 M/UL
RBC # FLD: 13.7 %
WBC # FLD AUTO: 7.3 K/UL

## 2021-06-17 PROCEDURE — 0501F PRENATAL FLOW SHEET: CPT

## 2021-06-17 PROCEDURE — 36415 COLL VENOUS BLD VENIPUNCTURE: CPT

## 2021-06-18 LAB
ABO + RH PNL BLD: NORMAL
ALBUMIN SERPL ELPH-MCNC: 4.1 G/DL
ALP BLD-CCNC: 60 U/L
ALT SERPL-CCNC: 9 U/L
ANION GAP SERPL CALC-SCNC: 16 MMOL/L
AST SERPL-CCNC: 5 U/L
BILIRUB SERPL-MCNC: <0.2 MG/DL
BLD GP AB SCN SERPL QL: NORMAL
BUN SERPL-MCNC: 9 MG/DL
CALCIUM SERPL-MCNC: 8.7 MG/DL
CHLORIDE SERPL-SCNC: 101 MMOL/L
CMV IGG SERPL QL: 2.5 U/ML
CMV IGG SERPL-IMP: POSITIVE
CMV IGM SERPL QL: <8 AU/ML
CMV IGM SERPL QL: NEGATIVE
CO2 SERPL-SCNC: 20 MMOL/L
CREAT SERPL-MCNC: 0.48 MG/DL
ESTIMATED AVERAGE GLUCOSE: 114 MG/DL
GLUCOSE SERPL-MCNC: 88 MG/DL
HBA1C MFR BLD HPLC: 5.6 %
HBV SURFACE AG SER QL: NONREACTIVE
HCV AB SER QL: NONREACTIVE
HCV S/CO RATIO: 0.18 S/CO
HGB A MFR BLD: 97 %
HGB A2 MFR BLD: 3 %
HGB FRACT BLD-IMP: NORMAL
HIV1+2 AB SPEC QL IA.RAPID: NONREACTIVE
LEAD BLD-MCNC: <1 UG/DL
MEV IGG FLD QL IA: 36.7 AU/ML
MEV IGG+IGM SER-IMP: POSITIVE
POTASSIUM SERPL-SCNC: 4 MMOL/L
PROT SERPL-MCNC: 6.8 G/DL
RUBV IGG FLD-ACNC: 8.2 INDEX
RUBV IGG SER-IMP: POSITIVE
SODIUM SERPL-SCNC: 137 MMOL/L
T GONDII AB SER-IMP: NEGATIVE
T GONDII AB SER-IMP: NEGATIVE
T GONDII IGG SER QL: <3 IU/ML
T GONDII IGM SER QL: <3 AU/ML
T PALLIDUM AB SER QL IA: NEGATIVE
T3FREE SERPL-MCNC: 3.3 PG/ML
T4 FREE SERPL-MCNC: 1.1 NG/DL
VZV AB TITR SER: NEGATIVE
VZV IGG SER IF-ACNC: 64.2 INDEX

## 2021-06-21 ENCOUNTER — NON-APPOINTMENT (OUTPATIENT)
Age: 31
End: 2021-06-21

## 2021-06-21 ENCOUNTER — LABORATORY RESULT (OUTPATIENT)
Age: 31
End: 2021-06-21

## 2021-06-22 LAB
BACTERIA UR CULT: NORMAL
BSA DERIVED: 1.73 M2
CREAT 24H UR-MCNC: 1.8 G/24 H
CREAT ?TM UR-MCNC: 129 MG/DL
CREAT CL 24H UR+SERPL-VRATE: 266 ML/MIN
FMR1 GENE MUT ANL BLD/T: NORMAL
MEV IGM SER QL: <0.91 ISR
PROT 24H UR-MRATE: 8 MG/DL
PROT ?TM UR-MCNC: 24 HR
PROT UR-MCNC: 114 MG/24 H
RUBV IGM FLD-ACNC: <20 AU/ML
SPECIMEN VOL 24H UR: 1425 ML
TSH SERPL-ACNC: <0.01 UIU/ML
VZV IGM SER IF-ACNC: <0.91 INDEX

## 2021-06-26 LAB
AR GENE MUT ANL BLD/T: NORMAL
B19V IGG SER QL IA: 0.88 INDEX
B19V IGG+IGM SER-IMP: NEGATIVE
B19V IGG+IGM SER-IMP: NORMAL
B19V IGM FLD-ACNC: 0.08 INDEX
B19V IGM SER-ACNC: NEGATIVE
CREAT SPEC-SCNC: 129 MG/DL
CREAT/PROT UR: 0.1 RATIO
PROT UR-MCNC: 12 MG/DL

## 2021-06-30 ENCOUNTER — NON-APPOINTMENT (OUTPATIENT)
Age: 31
End: 2021-06-30

## 2021-07-01 ENCOUNTER — ASOB RESULT (OUTPATIENT)
Age: 31
End: 2021-07-01

## 2021-07-01 ENCOUNTER — TRANSCRIPTION ENCOUNTER (OUTPATIENT)
Age: 31
End: 2021-07-01

## 2021-07-01 ENCOUNTER — APPOINTMENT (OUTPATIENT)
Dept: ANTEPARTUM | Facility: CLINIC | Age: 31
End: 2021-07-01
Payer: COMMERCIAL

## 2021-07-01 PROCEDURE — 99203 OFFICE O/P NEW LOW 30 MIN: CPT | Mod: 95

## 2021-07-09 ENCOUNTER — NON-APPOINTMENT (OUTPATIENT)
Age: 31
End: 2021-07-09

## 2021-07-09 ENCOUNTER — LABORATORY RESULT (OUTPATIENT)
Age: 31
End: 2021-07-09

## 2021-07-09 ENCOUNTER — APPOINTMENT (OUTPATIENT)
Dept: OBGYN | Facility: CLINIC | Age: 31
End: 2021-07-09
Payer: COMMERCIAL

## 2021-07-09 VITALS — BODY MASS INDEX: 35.61 KG/M2 | WEIGHT: 201 LBS | DIASTOLIC BLOOD PRESSURE: 84 MMHG | SYSTOLIC BLOOD PRESSURE: 132 MMHG

## 2021-07-09 PROCEDURE — 36415 COLL VENOUS BLD VENIPUNCTURE: CPT

## 2021-07-09 PROCEDURE — 0502F SUBSEQUENT PRENATAL CARE: CPT

## 2021-07-12 LAB
BACTERIA UR CULT: NORMAL
T3FREE SERPL-MCNC: 3.17 PG/ML
T4 FREE SERPL-MCNC: 1 NG/DL
TSH SERPL-ACNC: 0.01 UIU/ML

## 2021-07-13 LAB — TSH RECEPTOR AB: <1.1 IU/L

## 2021-08-02 ENCOUNTER — NON-APPOINTMENT (OUTPATIENT)
Age: 31
End: 2021-08-02

## 2021-08-02 ENCOUNTER — APPOINTMENT (OUTPATIENT)
Dept: OBGYN | Facility: CLINIC | Age: 31
End: 2021-08-02
Payer: COMMERCIAL

## 2021-08-02 PROCEDURE — 0502F SUBSEQUENT PRENATAL CARE: CPT

## 2021-08-06 ENCOUNTER — ASOB RESULT (OUTPATIENT)
Age: 31
End: 2021-08-06

## 2021-08-06 ENCOUNTER — APPOINTMENT (OUTPATIENT)
Dept: ANTEPARTUM | Facility: CLINIC | Age: 31
End: 2021-08-06
Payer: COMMERCIAL

## 2021-08-06 PROCEDURE — 76811 OB US DETAILED SNGL FETUS: CPT

## 2021-08-08 LAB — BACTERIA UR CULT: NORMAL

## 2021-08-11 ENCOUNTER — APPOINTMENT (OUTPATIENT)
Dept: ENDOCRINOLOGY | Facility: CLINIC | Age: 31
End: 2021-08-11
Payer: COMMERCIAL

## 2021-08-11 VITALS
SYSTOLIC BLOOD PRESSURE: 130 MMHG | HEART RATE: 87 BPM | DIASTOLIC BLOOD PRESSURE: 90 MMHG | TEMPERATURE: 97.3 F | HEIGHT: 63 IN | WEIGHT: 205 LBS | BODY MASS INDEX: 36.32 KG/M2 | OXYGEN SATURATION: 99 %

## 2021-08-11 PROCEDURE — 99214 OFFICE O/P EST MOD 30 MIN: CPT

## 2021-08-11 NOTE — REVIEW OF SYSTEMS
[Fatigue] : no fatigue [Decreased Appetite] : appetite not decreased [Recent Weight Gain (___ Lbs)] : no recent weight gain [Recent Weight Loss (___ Lbs)] : no recent weight loss [Visual Field Defect] : no visual field defect [Dry Eyes] : no dryness [Dysphagia] : no dysphagia [Neck Pain] : no neck pain [Dysphonia] : no dysphonia [Nasal Congestion] : no nasal congestion [Chest Pain] : no chest pain [Slow Heart Rate] : heart rate is not slow [Palpitations] : no palpitations [Fast Heart Rate] : heart rate is not fast [Shortness Of Breath] : no shortness of breath [Cough] : no cough [Nausea] : no nausea [Constipation] : no constipation [Vomiting] : no vomiting [Polyuria] : no polyuria [Irregular Menses] : regular menses [Joint Pain] : no joint pain [Muscle Weakness] : no muscle weakness [Acanthosis] : no acanthosis  [Headaches] : no headaches [Tremors] : no tremors [Dizziness] : no dizziness [Pain/Numbness of Digits] : no pain/numbness of digits [Depression] : no depression [Polydipsia] : no polydipsia [Cold Intolerance] : no cold intolerance [Easy Bleeding] : no ~M tendency for easy bleeding [Easy Bruising] : no tendency for easy bruising
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
## 2021-08-11 NOTE — ASSESSMENT
[FreeTextEntry1] : 30 year old female with history of subclinical hyperthyroidism ( likely 2/2 ab negative Graves disease) and thyroid nodules currently 20 weeks pregnant ( 5th pregnancy)\par \par \par -Will check baseline TFTs\par -Previously normal FT4 and T3 ( 3 weeks prior) \par -Symptomatically euthyroid \par -Will check official ultrasound post pregnancy \par -Check TFTs again in 6 weeks \par -Follow up in 3 months

## 2021-08-13 LAB
T3 SERPL-MCNC: 174 NG/DL
T4 SERPL-MCNC: 8.8 UG/DL
TSH SERPL-ACNC: 0.14 UIU/ML

## 2021-08-14 ENCOUNTER — NON-APPOINTMENT (OUTPATIENT)
Age: 31
End: 2021-08-14

## 2021-09-03 ENCOUNTER — NON-APPOINTMENT (OUTPATIENT)
Age: 31
End: 2021-09-03

## 2021-09-03 ENCOUNTER — APPOINTMENT (OUTPATIENT)
Dept: OBGYN | Facility: CLINIC | Age: 31
End: 2021-09-03
Payer: COMMERCIAL

## 2021-09-03 PROCEDURE — 0502F SUBSEQUENT PRENATAL CARE: CPT

## 2021-09-03 PROCEDURE — 36415 COLL VENOUS BLD VENIPUNCTURE: CPT

## 2021-09-08 LAB
BASOPHILS # BLD AUTO: 0.1 K/UL
BASOPHILS NFR BLD AUTO: 1.3 %
EOSINOPHIL # BLD AUTO: 0.24 K/UL
EOSINOPHIL NFR BLD AUTO: 3.1 %
GLUCOSE 1H P 50 G GLC PO SERPL-MCNC: 101 MG/DL
HCT VFR BLD CALC: 35.8 %
HGB BLD-MCNC: 11.9 G/DL
IMM GRANULOCYTES NFR BLD AUTO: 1.4 %
LYMPHOCYTES # BLD AUTO: 2.39 K/UL
LYMPHOCYTES NFR BLD AUTO: 31.3 %
MAN DIFF?: NORMAL
MCHC RBC-ENTMCNC: 29.5 PG
MCHC RBC-ENTMCNC: 33.2 GM/DL
MCV RBC AUTO: 88.8 FL
MONOCYTES # BLD AUTO: 0.51 K/UL
MONOCYTES NFR BLD AUTO: 6.7 %
NEUTROPHILS # BLD AUTO: 4.28 K/UL
NEUTROPHILS NFR BLD AUTO: 56.2 %
PLATELET # BLD AUTO: 277 K/UL
RBC # BLD: 4.03 M/UL
RBC # FLD: 13.4 %
WBC # FLD AUTO: 7.63 K/UL

## 2021-09-29 ENCOUNTER — NON-APPOINTMENT (OUTPATIENT)
Age: 31
End: 2021-09-29

## 2021-09-30 ENCOUNTER — INPATIENT (INPATIENT)
Facility: HOSPITAL | Age: 31
LOS: 2 days | Discharge: HOME | End: 2021-10-03
Attending: OBSTETRICS & GYNECOLOGY | Admitting: OBSTETRICS & GYNECOLOGY
Payer: COMMERCIAL

## 2021-09-30 VITALS — DIASTOLIC BLOOD PRESSURE: 96 MMHG | HEART RATE: 95 BPM | SYSTOLIC BLOOD PRESSURE: 142 MMHG

## 2021-09-30 DIAGNOSIS — Z98.890 OTHER SPECIFIED POSTPROCEDURAL STATES: Chronic | ICD-10-CM

## 2021-09-30 DIAGNOSIS — Z98.89 OTHER SPECIFIED POSTPROCEDURAL STATES: Chronic | ICD-10-CM

## 2021-09-30 DIAGNOSIS — O13.9 GESTATIONAL [PREGNANCY-INDUCED] HYPERTENSION WITHOUT SIGNIFICANT PROTEINURIA, UNSPECIFIED TRIMESTER: ICD-10-CM

## 2021-09-30 DIAGNOSIS — Z3A.00 WEEKS OF GESTATION OF PREGNANCY NOT SPECIFIED: ICD-10-CM

## 2021-09-30 DIAGNOSIS — O03.9 COMPLETE OR UNSPECIFIED SPONTANEOUS ABORTION WITHOUT COMPLICATION: Chronic | ICD-10-CM

## 2021-09-30 DIAGNOSIS — O26.899 OTHER SPECIFIED PREGNANCY RELATED CONDITIONS, UNSPECIFIED TRIMESTER: ICD-10-CM

## 2021-09-30 LAB
ALBUMIN SERPL ELPH-MCNC: 4 G/DL — SIGNIFICANT CHANGE UP (ref 3.3–5)
ALP SERPL-CCNC: 69 U/L — SIGNIFICANT CHANGE UP (ref 40–120)
ALT FLD-CCNC: 6 U/L — SIGNIFICANT CHANGE UP (ref 4–33)
ANION GAP SERPL CALC-SCNC: 14 MMOL/L — SIGNIFICANT CHANGE UP (ref 7–14)
APPEARANCE UR: ABNORMAL
APTT BLD: 29.7 SEC — SIGNIFICANT CHANGE UP (ref 27–36.3)
AST SERPL-CCNC: 12 U/L — SIGNIFICANT CHANGE UP (ref 4–32)
BACTERIA # UR AUTO: ABNORMAL
BASOPHILS # BLD AUTO: 0.12 K/UL — SIGNIFICANT CHANGE UP (ref 0–0.2)
BASOPHILS NFR BLD AUTO: 1.6 % — SIGNIFICANT CHANGE UP (ref 0–2)
BILIRUB SERPL-MCNC: <0.2 MG/DL — SIGNIFICANT CHANGE UP (ref 0.2–1.2)
BILIRUB UR-MCNC: NEGATIVE — SIGNIFICANT CHANGE UP
BUN SERPL-MCNC: 7 MG/DL — SIGNIFICANT CHANGE UP (ref 7–23)
CALCIUM SERPL-MCNC: 9.2 MG/DL — SIGNIFICANT CHANGE UP (ref 8.4–10.5)
CHLORIDE SERPL-SCNC: 102 MMOL/L — SIGNIFICANT CHANGE UP (ref 98–107)
CO2 SERPL-SCNC: 20 MMOL/L — LOW (ref 22–31)
COLOR SPEC: SIGNIFICANT CHANGE UP
CREAT ?TM UR-MCNC: 50 MG/DL — SIGNIFICANT CHANGE UP
CREAT SERPL-MCNC: 0.38 MG/DL — LOW (ref 0.5–1.3)
DIFF PNL FLD: ABNORMAL
EOSINOPHIL # BLD AUTO: 0.31 K/UL — SIGNIFICANT CHANGE UP (ref 0–0.5)
EOSINOPHIL NFR BLD AUTO: 4 % — SIGNIFICANT CHANGE UP (ref 0–6)
EPI CELLS # UR: 5 /HPF — SIGNIFICANT CHANGE UP (ref 0–5)
FIBRINOGEN PPP-MCNC: 524 MG/DL — HIGH (ref 290–520)
GLUCOSE SERPL-MCNC: 86 MG/DL — SIGNIFICANT CHANGE UP (ref 70–99)
GLUCOSE UR QL: NEGATIVE — SIGNIFICANT CHANGE UP
HCT VFR BLD CALC: 33 % — LOW (ref 34.5–45)
HGB BLD-MCNC: 11 G/DL — LOW (ref 11.5–15.5)
HYALINE CASTS # UR AUTO: 4 /LPF — SIGNIFICANT CHANGE UP (ref 0–7)
IANC: 3.76 K/UL — SIGNIFICANT CHANGE UP (ref 1.5–8.5)
IMM GRANULOCYTES NFR BLD AUTO: 2.7 % — HIGH (ref 0–1.5)
INR BLD: 1.04 RATIO — SIGNIFICANT CHANGE UP (ref 0.88–1.16)
KETONES UR-MCNC: NEGATIVE — SIGNIFICANT CHANGE UP
LDH SERPL L TO P-CCNC: 194 U/L — SIGNIFICANT CHANGE UP (ref 135–225)
LEUKOCYTE ESTERASE UR-ACNC: ABNORMAL
LYMPHOCYTES # BLD AUTO: 2.42 K/UL — SIGNIFICANT CHANGE UP (ref 1–3.3)
LYMPHOCYTES # BLD AUTO: 31.6 % — SIGNIFICANT CHANGE UP (ref 13–44)
MCHC RBC-ENTMCNC: 28.8 PG — SIGNIFICANT CHANGE UP (ref 27–34)
MCHC RBC-ENTMCNC: 33.3 GM/DL — SIGNIFICANT CHANGE UP (ref 32–36)
MCV RBC AUTO: 86.4 FL — SIGNIFICANT CHANGE UP (ref 80–100)
MONOCYTES # BLD AUTO: 0.84 K/UL — SIGNIFICANT CHANGE UP (ref 0–0.9)
MONOCYTES NFR BLD AUTO: 11 % — SIGNIFICANT CHANGE UP (ref 2–14)
NEUTROPHILS # BLD AUTO: 3.76 K/UL — SIGNIFICANT CHANGE UP (ref 1.8–7.4)
NEUTROPHILS NFR BLD AUTO: 49.1 % — SIGNIFICANT CHANGE UP (ref 43–77)
NITRITE UR-MCNC: NEGATIVE — SIGNIFICANT CHANGE UP
NRBC # BLD: 0 /100 WBCS — SIGNIFICANT CHANGE UP
NRBC # FLD: 0 K/UL — SIGNIFICANT CHANGE UP
PH UR: 7 — SIGNIFICANT CHANGE UP (ref 5–8)
PLATELET # BLD AUTO: 247 K/UL — SIGNIFICANT CHANGE UP (ref 150–400)
POTASSIUM SERPL-MCNC: 3.8 MMOL/L — SIGNIFICANT CHANGE UP (ref 3.5–5.3)
POTASSIUM SERPL-SCNC: 3.8 MMOL/L — SIGNIFICANT CHANGE UP (ref 3.5–5.3)
PROT ?TM UR-MCNC: 8 MG/DL — SIGNIFICANT CHANGE UP
PROT ?TM UR-MCNC: 8 MG/DL — SIGNIFICANT CHANGE UP
PROT SERPL-MCNC: 7 G/DL — SIGNIFICANT CHANGE UP (ref 6–8.3)
PROT UR-MCNC: NEGATIVE — SIGNIFICANT CHANGE UP
PROT/CREAT UR-RTO: 0.2 RATIO — SIGNIFICANT CHANGE UP (ref 0–0.2)
PROTHROM AB SERPL-ACNC: 11.9 SEC — SIGNIFICANT CHANGE UP (ref 10.6–13.6)
RBC # BLD: 3.82 M/UL — SIGNIFICANT CHANGE UP (ref 3.8–5.2)
RBC # FLD: 13.7 % — SIGNIFICANT CHANGE UP (ref 10.3–14.5)
RBC CASTS # UR COMP ASSIST: 1 /HPF — SIGNIFICANT CHANGE UP (ref 0–4)
SODIUM SERPL-SCNC: 136 MMOL/L — SIGNIFICANT CHANGE UP (ref 135–145)
SP GR SPEC: 1.01 — SIGNIFICANT CHANGE UP (ref 1–1.05)
URATE SERPL-MCNC: 3.7 MG/DL — SIGNIFICANT CHANGE UP (ref 2.5–7)
UROBILINOGEN FLD QL: SIGNIFICANT CHANGE UP
WBC # BLD: 7.66 K/UL — SIGNIFICANT CHANGE UP (ref 3.8–10.5)
WBC # FLD AUTO: 7.66 K/UL — SIGNIFICANT CHANGE UP (ref 3.8–10.5)
WBC UR QL: 19 /HPF — HIGH (ref 0–5)

## 2021-09-30 RX ORDER — SODIUM CHLORIDE 9 MG/ML
3 INJECTION INTRAMUSCULAR; INTRAVENOUS; SUBCUTANEOUS EVERY 8 HOURS
Refills: 0 | Status: DISCONTINUED | OUTPATIENT
Start: 2021-09-30 | End: 2021-10-03

## 2021-09-30 RX ORDER — INFLUENZA VIRUS VACCINE 15; 15; 15; 15 UG/.5ML; UG/.5ML; UG/.5ML; UG/.5ML
0.5 SUSPENSION INTRAMUSCULAR ONCE
Refills: 0 | Status: COMPLETED | OUTPATIENT
Start: 2021-09-30 | End: 2021-09-30

## 2021-09-30 RX ORDER — ACETAMINOPHEN 500 MG
1000 TABLET ORAL ONCE
Refills: 0 | Status: COMPLETED | OUTPATIENT
Start: 2021-09-30 | End: 2021-09-30

## 2021-09-30 RX ADMIN — Medication 12 MILLIGRAM(S): at 23:17

## 2021-09-30 RX ADMIN — Medication 1000 MILLIGRAM(S): at 20:15

## 2021-09-30 RX ADMIN — Medication 1000 MILLIGRAM(S): at 19:48

## 2021-09-30 NOTE — OB RN TRIAGE NOTE - NS_OBGYNHISTORY_OBGYN_ALL_OB_FT
2007 PEC   2010 7#   2017 PEC    2019 PEC   top with d&c 2015  2007 PEC   2010 7#   2017 PEC    2019 PEC   top with d&c 2014

## 2021-09-30 NOTE — OB PROVIDER H&P - NSHPPHYSICALEXAM_GEN_ALL_CORE
ICU Vital Signs Last 24 Hrs  T(C): 36.9 (30 Sep 2021 19:13), Max: 36.9 (30 Sep 2021 19:13)  T(F): 98.4 (30 Sep 2021 19:13), Max: 98.4 (30 Sep 2021 19:13)  HR: 85 (30 Sep 2021 20:57) (83 - 95)  BP: 135/75 (30 Sep 2021 21:27) (122/77 - 146/97)  BP(mean): --  ABP: --  ABP(mean): --  RR: 18 (30 Sep 2021 19:13) (18 - 18)  SpO2: --    Abdomen soft nontender  TAS: Cephalic presentation, posterior placenta, EDGAR:12.56. EFW: 1465gms, BPP: 8/8 ICU Vital Signs Last 24 Hrs  T(C): 36.9 (30 Sep 2021 19:13), Max: 36.9 (30 Sep 2021 19:13)  T(F): 98.4 (30 Sep 2021 19:13), Max: 98.4 (30 Sep 2021 19:13)  HR: 85 (30 Sep 2021 20:57) (83 - 95)  BP: 135/75 (30 Sep 2021 21:27) (122/77 - 146/97)  BP(mean): --  ABP: --  ABP(mean): --  RR: 18 (30 Sep 2021 19:13) (18 - 18)  SpO2: --    Abdomen soft nontender  TAS: Cephalic presentation, posterior placenta, EDGAR:12.56. EFW: 1465gms, BPP: 8/8  FHR: 145bpm, moderate variability, accels, no decels  No contractions on TOCO

## 2021-09-30 NOTE — OB PROVIDER H&P - NS_OBGYNHISTORY_OBGYN_ALL_OB_FT
2007 PEC   2010 7#   2017 PEC    2019 PEC   top with d&c 2015 SAB 2015  TOPx1 2015 with D&C    2007 7#0 IOL @37wks for PEC   10/27/2010 7#0   2017 10#0 PEC   2019 7#0 IOL @37wks for PEC/Mg SAB 2015  TOPx1 2015 with D&C    2007 7#0 IOL@37wks for PEC   10/27/2010 7#0   2017 10#0 PEC   2019 7#0 IOL@37wks for PEC/Mg

## 2021-09-30 NOTE — OB RN PATIENT PROFILE - NS_OBGYNHISTORY_OBGYN_ALL_OB_FT
SAB 2015  TOPx1 2015 with D&C    2007 7#0 IOL@37wks for PEC   10/27/2010 7#0   2017 10#0 PEC   2019 7#0 IOL@37wks for PEC/Mg

## 2021-09-30 NOTE — OB PROVIDER H&P - ASSESSMENT
Discussed findings with Dr. Tapia evidence of gestational hypertension.   -Admit to L&D for blood pressure monitoring   -Routine, UC, GBS, and COVID ordered  -Plan for 24hr urine collection and MFM consult  Discussed findings with Dr. Tapia evidence of gestational hypertension.   -Admit to L&D for blood pressure monitoring   -Routine, UC, GBS, and COVID ordered  -Plan for 24hr urine collection and MFM consult   -Betamethasone for fetal lung maturity

## 2021-09-30 NOTE — OB PROVIDER H&P - HISTORY OF PRESENT ILLNESS
Pt. is a 29y/o  EGA 28.3wks states since waking up around 14:30 feeling dizzy and lightheaded. Pt.  Pt. is a 31y/o  EGA 28.3wks states since waking up around 14:30 feeling dizzy and lightheaded. Pt. states she took her blood pressure and it was 152/96. Pt. also reports on her way to work at 5:30pm she felt the dizzy and lightheaded again and her blood pressure was 167/107. Pt. also reports of a headache 10/10. Pt. denies any visual changes, epigastric/RUQ pain, and N/V. Pt. reports good fetal movement.     AP: Denies    Medical Hx: Asthma, Graves Disease, Herniated Disc    Surgical Hx: Abdominoplasty, hernia repair  and D&C    OBGYN Hx:   SAB 2015  TOPx1 2015 with D&C    2007 7#0 IOL @37wks for PEC   10/27/2010 7#0   2017 10#0 PEC   2019 7#0 IOL @37wks for PEC/Mg     Meds: PNV, Albuterol PRN. (Pt. states she was taking ASA but was told to stop at 26wks)     NKDA

## 2021-09-30 NOTE — OB PROVIDER H&P - NSHPLABSRESULTS_GEN_ALL_CORE
11.0   7.66  )-----------( 247      ( 30 Sep 2021 19:48 )             33.0     PT/INR - ( 30 Sep 2021 19:48 )   PT: 11.9 sec;   INR: 1.04 ratio    PTT - ( 30 Sep 2021 19:48 )  PTT:29.7 sec  Fibrinogen: 524        136  |  102  |  7   ----------------------------<  86  3.8   |  20<L>  |  0.38<L>    Ca    9.2      30 Sep 2021 19:48    TPro  7.0  /  Alb  4.0  /  TBili  <0.2  /  DBili  x   /  AST  12  /  ALT  6   /  AlkPhos  69    Uric Acid: 3.7  LDH: 194    PC Ratio: 0.2    Urinalysis Basic - ( 30 Sep 2021 19:48 )    Color: Light Yellow / Appearance: Slightly Turbid / S.009 / pH: x  Gluc: x / Ketone: Negative  / Bili: Negative / Urobili: <2 mg/dL   Blood: x / Protein: Negative / Nitrite: Negative   Leuk Esterase: Moderate / RBC: 1 /HPF / WBC 19 /HPF   Sq Epi: x / Non Sq Epi: 5 /HPF / Bacteria: Few

## 2021-10-01 ENCOUNTER — NON-APPOINTMENT (OUTPATIENT)
Age: 31
End: 2021-10-01

## 2021-10-01 ENCOUNTER — APPOINTMENT (OUTPATIENT)
Dept: OBGYN | Facility: CLINIC | Age: 31
End: 2021-10-01

## 2021-10-01 ENCOUNTER — TRANSCRIPTION ENCOUNTER (OUTPATIENT)
Age: 31
End: 2021-10-01

## 2021-10-01 LAB
ALBUMIN SERPL ELPH-MCNC: 4 G/DL — SIGNIFICANT CHANGE UP (ref 3.3–5)
ALP SERPL-CCNC: 75 U/L — SIGNIFICANT CHANGE UP (ref 40–120)
ALT FLD-CCNC: 10 U/L — SIGNIFICANT CHANGE UP (ref 4–33)
ANION GAP SERPL CALC-SCNC: 13 MMOL/L — SIGNIFICANT CHANGE UP (ref 7–14)
APTT BLD: 28.2 SEC — SIGNIFICANT CHANGE UP (ref 27–36.3)
AST SERPL-CCNC: 13 U/L — SIGNIFICANT CHANGE UP (ref 4–32)
BASOPHILS # BLD AUTO: 0.07 K/UL — SIGNIFICANT CHANGE UP (ref 0–0.2)
BASOPHILS NFR BLD AUTO: 0.5 % — SIGNIFICANT CHANGE UP (ref 0–2)
BILIRUB SERPL-MCNC: <0.2 MG/DL — SIGNIFICANT CHANGE UP (ref 0.2–1.2)
BUN SERPL-MCNC: 10 MG/DL — SIGNIFICANT CHANGE UP (ref 7–23)
CALCIUM SERPL-MCNC: 9.6 MG/DL — SIGNIFICANT CHANGE UP (ref 8.4–10.5)
CHLORIDE SERPL-SCNC: 102 MMOL/L — SIGNIFICANT CHANGE UP (ref 98–107)
CO2 SERPL-SCNC: 20 MMOL/L — LOW (ref 22–31)
COVID-19 SPIKE DOMAIN AB INTERP: NEGATIVE — SIGNIFICANT CHANGE UP
COVID-19 SPIKE DOMAIN ANTIBODY RESULT: 0.4 U/ML — SIGNIFICANT CHANGE UP
CREAT SERPL-MCNC: 0.39 MG/DL — LOW (ref 0.5–1.3)
EOSINOPHIL # BLD AUTO: 0.07 K/UL — SIGNIFICANT CHANGE UP (ref 0–0.5)
EOSINOPHIL NFR BLD AUTO: 0.5 % — SIGNIFICANT CHANGE UP (ref 0–6)
FIBRINOGEN PPP-MCNC: 483 MG/DL — SIGNIFICANT CHANGE UP (ref 290–520)
GLUCOSE SERPL-MCNC: 107 MG/DL — HIGH (ref 70–99)
HCT VFR BLD CALC: 33.1 % — LOW (ref 34.5–45)
HGB BLD-MCNC: 11.2 G/DL — LOW (ref 11.5–15.5)
IANC: 8.62 K/UL — HIGH (ref 1.5–8.5)
IMM GRANULOCYTES NFR BLD AUTO: 2 % — HIGH (ref 0–1.5)
INR BLD: 1.04 RATIO — SIGNIFICANT CHANGE UP (ref 0.88–1.16)
LDH SERPL L TO P-CCNC: 173 U/L — SIGNIFICANT CHANGE UP (ref 135–225)
LYMPHOCYTES # BLD AUTO: 17.2 % — SIGNIFICANT CHANGE UP (ref 13–44)
LYMPHOCYTES # BLD AUTO: 2.21 K/UL — SIGNIFICANT CHANGE UP (ref 1–3.3)
MCHC RBC-ENTMCNC: 29.2 PG — SIGNIFICANT CHANGE UP (ref 27–34)
MCHC RBC-ENTMCNC: 33.8 GM/DL — SIGNIFICANT CHANGE UP (ref 32–36)
MCV RBC AUTO: 86.2 FL — SIGNIFICANT CHANGE UP (ref 80–100)
MONOCYTES # BLD AUTO: 1.63 K/UL — HIGH (ref 0–0.9)
MONOCYTES NFR BLD AUTO: 12.7 % — SIGNIFICANT CHANGE UP (ref 2–14)
NEUTROPHILS # BLD AUTO: 8.62 K/UL — HIGH (ref 1.8–7.4)
NEUTROPHILS NFR BLD AUTO: 67.1 % — SIGNIFICANT CHANGE UP (ref 43–77)
NRBC # BLD: 0 /100 WBCS — SIGNIFICANT CHANGE UP
NRBC # FLD: 0 K/UL — SIGNIFICANT CHANGE UP
PLATELET # BLD AUTO: 276 K/UL — SIGNIFICANT CHANGE UP (ref 150–400)
POTASSIUM SERPL-MCNC: 4.3 MMOL/L — SIGNIFICANT CHANGE UP (ref 3.5–5.3)
POTASSIUM SERPL-SCNC: 4.3 MMOL/L — SIGNIFICANT CHANGE UP (ref 3.5–5.3)
PROT SERPL-MCNC: 7.4 G/DL — SIGNIFICANT CHANGE UP (ref 6–8.3)
PROTHROM AB SERPL-ACNC: 11.8 SEC — SIGNIFICANT CHANGE UP (ref 10.6–13.6)
RBC # BLD: 3.84 M/UL — SIGNIFICANT CHANGE UP (ref 3.8–5.2)
RBC # FLD: 13.7 % — SIGNIFICANT CHANGE UP (ref 10.3–14.5)
SARS-COV-2 IGG+IGM SERPL QL IA: 0.4 U/ML — SIGNIFICANT CHANGE UP
SARS-COV-2 IGG+IGM SERPL QL IA: NEGATIVE — SIGNIFICANT CHANGE UP
SARS-COV-2 RNA SPEC QL NAA+PROBE: SIGNIFICANT CHANGE UP
SODIUM SERPL-SCNC: 135 MMOL/L — SIGNIFICANT CHANGE UP (ref 135–145)
T PALLIDUM AB TITR SER: NEGATIVE — SIGNIFICANT CHANGE UP
T3 SERPL-MCNC: 146 NG/DL — SIGNIFICANT CHANGE UP (ref 80–200)
URATE SERPL-MCNC: 4.3 MG/DL — SIGNIFICANT CHANGE UP (ref 2.5–7)
WBC # BLD: 12.86 K/UL — HIGH (ref 3.8–10.5)
WBC # FLD AUTO: 12.86 K/UL — HIGH (ref 3.8–10.5)

## 2021-10-01 PROCEDURE — 99231 SBSQ HOSP IP/OBS SF/LOW 25: CPT

## 2021-10-01 PROCEDURE — 70450 CT HEAD/BRAIN W/O DYE: CPT | Mod: 26

## 2021-10-01 RX ORDER — SODIUM CHLORIDE 9 MG/ML
1000 INJECTION, SOLUTION INTRAVENOUS
Refills: 0 | Status: DISCONTINUED | OUTPATIENT
Start: 2021-10-01 | End: 2021-10-03

## 2021-10-01 RX ORDER — ACETAMINOPHEN 500 MG
1000 TABLET ORAL ONCE
Refills: 0 | Status: COMPLETED | OUTPATIENT
Start: 2021-10-01 | End: 2021-10-01

## 2021-10-01 RX ORDER — DIPHENHYDRAMINE HCL 50 MG
25 CAPSULE ORAL EVERY 4 HOURS
Refills: 0 | Status: DISCONTINUED | OUTPATIENT
Start: 2021-10-01 | End: 2021-10-03

## 2021-10-01 RX ORDER — FOLIC ACID 0.8 MG
1 TABLET ORAL DAILY
Refills: 0 | Status: DISCONTINUED | OUTPATIENT
Start: 2021-10-01 | End: 2021-10-03

## 2021-10-01 RX ORDER — HYDRALAZINE HCL 50 MG
5 TABLET ORAL ONCE
Refills: 0 | Status: COMPLETED | OUTPATIENT
Start: 2021-10-01 | End: 2021-10-01

## 2021-10-01 RX ORDER — MAGNESIUM SULFATE 500 MG/ML
2 VIAL (ML) INJECTION
Qty: 40 | Refills: 0 | Status: DISCONTINUED | OUTPATIENT
Start: 2021-10-01 | End: 2021-10-02

## 2021-10-01 RX ORDER — ALBUTEROL 90 UG/1
2 AEROSOL, METERED ORAL EVERY 6 HOURS
Refills: 0 | Status: DISCONTINUED | OUTPATIENT
Start: 2021-10-01 | End: 2021-10-03

## 2021-10-01 RX ORDER — NIFEDIPINE 30 MG
30 TABLET, EXTENDED RELEASE 24 HR ORAL DAILY
Refills: 0 | Status: DISCONTINUED | OUTPATIENT
Start: 2021-10-01 | End: 2021-10-03

## 2021-10-01 RX ORDER — ASPIRIN/CALCIUM CARB/MAGNESIUM 324 MG
81 TABLET ORAL DAILY
Refills: 0 | Status: DISCONTINUED | OUTPATIENT
Start: 2021-10-01 | End: 2021-10-03

## 2021-10-01 RX ORDER — MAGNESIUM SULFATE 500 MG/ML
4 VIAL (ML) INJECTION ONCE
Refills: 0 | Status: COMPLETED | OUTPATIENT
Start: 2021-10-01 | End: 2021-10-01

## 2021-10-01 RX ORDER — LIDOCAINE 4 G/100G
1 CREAM TOPICAL EVERY 24 HOURS
Refills: 0 | Status: DISCONTINUED | OUTPATIENT
Start: 2021-10-02 | End: 2021-10-03

## 2021-10-01 RX ORDER — ACETAMINOPHEN 500 MG
975 TABLET ORAL ONCE
Refills: 0 | Status: COMPLETED | OUTPATIENT
Start: 2021-10-01 | End: 2021-10-01

## 2021-10-01 RX ORDER — LIDOCAINE 4 G/100G
1 CREAM TOPICAL ONCE
Refills: 0 | Status: COMPLETED | OUTPATIENT
Start: 2021-10-01 | End: 2021-10-01

## 2021-10-01 RX ORDER — METOCLOPRAMIDE HCL 10 MG
10 TABLET ORAL EVERY 8 HOURS
Refills: 0 | Status: DISCONTINUED | OUTPATIENT
Start: 2021-10-01 | End: 2021-10-03

## 2021-10-01 RX ADMIN — SODIUM CHLORIDE 3 MILLILITER(S): 9 INJECTION INTRAMUSCULAR; INTRAVENOUS; SUBCUTANEOUS at 06:16

## 2021-10-01 RX ADMIN — LIDOCAINE 1 PATCH: 4 CREAM TOPICAL at 19:16

## 2021-10-01 RX ADMIN — Medication 1 TABLET(S): at 12:38

## 2021-10-01 RX ADMIN — Medication 1000 MILLIGRAM(S): at 22:20

## 2021-10-01 RX ADMIN — SODIUM CHLORIDE 3 MILLILITER(S): 9 INJECTION INTRAMUSCULAR; INTRAVENOUS; SUBCUTANEOUS at 14:48

## 2021-10-01 RX ADMIN — Medication 5 MILLIGRAM(S): at 20:19

## 2021-10-01 RX ADMIN — LIDOCAINE 1 PATCH: 4 CREAM TOPICAL at 12:34

## 2021-10-01 RX ADMIN — Medication 81 MILLIGRAM(S): at 12:38

## 2021-10-01 RX ADMIN — Medication 1 MILLIGRAM(S): at 12:38

## 2021-10-01 RX ADMIN — Medication 975 MILLIGRAM(S): at 07:26

## 2021-10-01 RX ADMIN — Medication 50 GM/HR: at 21:59

## 2021-10-01 RX ADMIN — Medication 975 MILLIGRAM(S): at 06:54

## 2021-10-01 RX ADMIN — Medication 12 MILLIGRAM(S): at 22:14

## 2021-10-01 RX ADMIN — SODIUM CHLORIDE 50 MILLILITER(S): 9 INJECTION, SOLUTION INTRAVENOUS at 21:59

## 2021-10-01 RX ADMIN — Medication 300 GRAM(S): at 21:28

## 2021-10-01 RX ADMIN — LIDOCAINE 1 PATCH: 4 CREAM TOPICAL at 19:45

## 2021-10-01 RX ADMIN — SODIUM CHLORIDE 3 MILLILITER(S): 9 INJECTION INTRAMUSCULAR; INTRAVENOUS; SUBCUTANEOUS at 00:00

## 2021-10-01 RX ADMIN — Medication 30 MILLIGRAM(S): at 21:59

## 2021-10-01 RX ADMIN — Medication 400 MILLIGRAM(S): at 22:02

## 2021-10-01 RX ADMIN — SODIUM CHLORIDE 3 MILLILITER(S): 9 INJECTION INTRAMUSCULAR; INTRAVENOUS; SUBCUTANEOUS at 21:22

## 2021-10-01 NOTE — DISCHARGE NOTE ANTEPARTUM - MEDICATION SUMMARY - MEDICATIONS TO TAKE
I will START or STAY ON the medications listed below when I get home from the hospital:    NIFEdipine 30 mg oral tablet, extended release  -- 1 tab(s) by mouth once a day  -- Indication: For BP    Prena1 oral capsule  -- 1 cap(s) by mouth once a day  -- Indication: For PNV

## 2021-10-01 NOTE — DISCHARGE NOTE ANTEPARTUM - PLAN OF CARE
Please continue to monitor blood pressure at home. Contact your doctor for any blood pressures >150/100 or any associated symptoms, such as headaches, vision changes, chest pain, shortness of breath, abdominal pain or any other signs or symptoms concerning to you. Please call your doctor if you have decreased fetal movement, contractions, vaginal bleeding or leakage of fluid.

## 2021-10-01 NOTE — DISCHARGE NOTE ANTEPARTUM - HOSPITAL COURSE
31 yo  at 28.4 wks presenting w/ elevated blood pressure at home with measurement to systolic 180 and severe headache w/ dizziness for multiple days.  Patient was admitted for blood pressure monitoring for evaluation of sPEC.  While inpatient patient had mild range blood pressures.  Severe headache persisted and she had a CT head that showed ___.  24 hour urine was collected that showed ___.  Patient has a history of Graves disease w/ self discontinuation of methimazole.  A thyroid panel was sent that showed ___.  Per pain service recommendation severe back pain was managed with 4% lidocaine patch daily. 31 yo  at 28.4 wks presenting w/ elevated blood pressure at home with measurement to systolic 180 and severe headache w/ dizziness for multiple days.  Patient was admitted for blood pressure monitoring for evaluation of sPEC.  While inpatient patient had mild range blood pressures. Severe headache persisted and she had a CT head which was unremarkable.  24 hour urine was 149.  Patient has a history of Graves disease w/ self discontinuation of methimazole.  Per pain service recommendation severe back pain was managed with 4% lidocaine patch daily. She was started on Procardia 30XL and BPs were well controlled. She will start weekly NST/BPPs and will follow up within 1 week with her doctor.

## 2021-10-01 NOTE — CHART NOTE - NSCHARTNOTEFT_GEN_A_CORE
R3 Chart Note    Patient seen at bedside after sign out 2/2 headache and "feeling weird". At bedside, noted PCA had taken pressure found to be 161/95. Reports HA resolution, however "feels weird" describes as dizziness, lightheadedness. Denies HA, SOB, CP, RUQ/epigastric pain, b/l swelling LE and UE, or vision changes.     Patient with two sustained severe blood pressures requiring IVP medication (Hydral 5). Discussed with patient and RN for Magnesium to be started in setting of severe preeclampsia by BP. Patient refusing 2/2 prior experience in previous pregnancy. Discussion had with Dr. Palma (see addendum). NICU called to speak with patient, consulted at bedside.    Patient agreed to Magnesium and was additionally started on Procardia 30XL after a nonsustained severe pressure.   Will continue to monitor closely.   Will continue pregnancy if pressures can be controlled with medication.    d/w Dr. Palma  [place]ney PGY-3

## 2021-10-01 NOTE — PROGRESS NOTE ADULT - ATTENDING COMMENTS
Patient seen and examined by me along with the team. I reviewed and agree with the fellows/residents assessment and plan. This consultation is a high complexity maternal/fetal finding  that increases the complexity of medical decision making. The time was spent counseling, educating, reviewing previous notes and test,  coordinating care,  and documenting the progress note within the EMR.    Her history is suggestive of chronic hypertension. However, she has multiple risk factors for superimposed preeclampsia. Plan as above obtain ct of head given headache and dizziness. Blood pressure to be taken with patient sitting upright if persistently 150's may consider procardia 30 XL. iN HOUSE OBSERVATION UNTIL MORE DATA IS OBTAINED.  Dr. Walden

## 2021-10-01 NOTE — DISCHARGE NOTE ANTEPARTUM - CARE PLAN
1 Principal Discharge DX:	Gestational hypertension, antepartum   Principal Discharge DX:	Chronic hypertension with superimposed preeclampsia  Assessment and plan of treatment:	Please continue to monitor blood pressure at home. Contact your doctor for any blood pressures >150/100 or any associated symptoms, such as headaches, vision changes, chest pain, shortness of breath, abdominal pain or any other signs or symptoms concerning to you. Please call your doctor if you have decreased fetal movement, contractions, vaginal bleeding or leakage of fluid.

## 2021-10-01 NOTE — DISCHARGE NOTE ANTEPARTUM - CARE PROVIDER_API CALL
Mary Ellen Palma)  Obstetrics and Gynecology  [facility] [place] [address]  Phone: [phone]  Fax: [phone]  Follow Up Time:

## 2021-10-01 NOTE — DISCHARGE NOTE ANTEPARTUM - PATIENT PORTAL LINK FT
You can access the FollowMyHealth Patient Portal offered by [facility] by registering at the following website: [email or web address]. By joining [employer]’s FollowMyHealth portal, you will also be able to view your health information using other applications (apps) compatible with our system.

## 2021-10-02 LAB
COLLECT DURATION TIME UR: 24 HR — SIGNIFICANT CHANGE UP
COLLECT DURATION TIME UR: 24 HR — SIGNIFICANT CHANGE UP
CULTURE RESULTS: SIGNIFICANT CHANGE UP
MAGNESIUM SERPL-MCNC: 4.2 MG/DL — HIGH (ref 1.6–2.6)
MAGNESIUM SERPL-MCNC: 5.2 MG/DL — HIGH (ref 1.6–2.6)
PROT 24H UR-MRATE: 149 MG/24 H — SIGNIFICANT CHANGE UP
SPECIMEN SOURCE: SIGNIFICANT CHANGE UP
TOTAL VOLUME - 24 HOUR: 2575 ML — SIGNIFICANT CHANGE UP
TOTAL VOLUME - 24 HOUR: 2575 ML — SIGNIFICANT CHANGE UP
URINE CREATININE CALCULATION: 1.6 G/24 H — SIGNIFICANT CHANGE UP (ref 0.6–1.6)
URINE CREATININE CALCULATION: 1.6 G/24 H — SIGNIFICANT CHANGE UP (ref 0.6–1.6)

## 2021-10-02 PROCEDURE — 99254 IP/OBS CNSLTJ NEW/EST MOD 60: CPT

## 2021-10-02 RX ADMIN — Medication 50 GM/HR: at 13:31

## 2021-10-02 RX ADMIN — SODIUM CHLORIDE 50 MILLILITER(S): 9 INJECTION, SOLUTION INTRAVENOUS at 13:31

## 2021-10-02 RX ADMIN — Medication 1 MILLIGRAM(S): at 11:04

## 2021-10-02 RX ADMIN — Medication 81 MILLIGRAM(S): at 11:04

## 2021-10-02 RX ADMIN — SODIUM CHLORIDE 3 MILLILITER(S): 9 INJECTION INTRAMUSCULAR; INTRAVENOUS; SUBCUTANEOUS at 21:52

## 2021-10-02 RX ADMIN — Medication 1 TABLET(S): at 11:05

## 2021-10-02 RX ADMIN — SODIUM CHLORIDE 3 MILLILITER(S): 9 INJECTION INTRAMUSCULAR; INTRAVENOUS; SUBCUTANEOUS at 14:16

## 2021-10-02 RX ADMIN — Medication 30 MILLIGRAM(S): at 21:38

## 2021-10-02 RX ADMIN — SODIUM CHLORIDE 3 MILLILITER(S): 9 INJECTION INTRAMUSCULAR; INTRAVENOUS; SUBCUTANEOUS at 05:48

## 2021-10-02 RX ADMIN — Medication 50 GM/HR: at 07:15

## 2021-10-02 NOTE — PROGRESS NOTE ADULT - ATTENDING COMMENTS
Patient seen and examined by me along with the team. I reviewed and agree with the fellows/residents assessment and plan. This consultation is a high complexity maternal/fetal finding  that increases the complexity of medical decision making. The time was spent counseling, educating, reviewing previous notes and test,  coordinating care,  and documenting the progress note within the EMR.    Patient was started on procardia 30 XL due to severe range blood pressure. In addition, magnesium sulfate was started for concern of preeclampsia. She reports her headache finally went away last night. 24 hour urine < 300. Blood pressures range from 130-150"s/80-90. She denies any other symptoms. My working diagnosis is chronic hypertension exacerbation with headache. THE non contrast CT was negative and headaches seem improved after blood pressure control.  Continue procardia 30 XL to prevent maternal stroke. Magnesium sulfate may be discontinued given BP improvement and cessation of headache. Ongoing evaluation is recommended at this time. Patient agrees with the plan.

## 2021-10-02 NOTE — CONSULT NOTE PEDS - SUBJECTIVE AND OBJECTIVE BOX
is a 29 y/o  at 28w5d gestational age admitted due to elevated blood pressures, headache and dizziness, meeting criteria for preeclampsia w/ severe features.  Maternal history notable for chronic hypertension (not currently on medication) as well as multiple prior deliveries complicated by preeclampsia. Also with history of asthma, herniated disc, and hyperthyroidism secondary to Graves, previously on methimazole but self-discontinued.   Most recent EFW 1465g (96%ile) on  ATU sono.  Betamethasone x1 given 10/1 16:30, and magnesium started 10 PM.      NICU consulted to discuss what to expect if she were to deliver at 28 weeks gestation. 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  Due to lung immaturity, the infant will likely require respiratory support. This can be in the form of nasal cannula providing CPAP or intubation and mechanical ventilation. The outcomes improve after  steroids.   Depending on the clinical status of the infant, enteral feedings will likely not be started immediately. IV nutrition in the form of TPN would be provided via umbilical line or other subsequent form of IV until the infant is able to tolerate full enteral feedings.  Due to immature suck/swallow, she will require an orogastric tube once feeds are initiated. There will be a slow transition to enteral feedings.   Due to prematurity, the infant will be at risk for a variety of other complications throughout her NICU stay, including but not limited to infection, bleeding, and retinopathy of prematurity. We will be screening and monitoring for these things throughout the NICU stay.  Offered to discuss any of these specific potential complications in greater depth; deferred at this time but expressed that NICU team is available at any point to answer follow-up questions and discuss any concerns the family may have.  The infant is at risk for developmental delays as a consequence of prematurity. The infant will be evaluated by a developmental pediatrician to monitor for neurodevelopmental delays.  Length of stay is highly variable, but given the infant’s gestational age, average stay is approximately 7-9 weeks. Discussed discharged criteria, visiting policies, and infection prevention precautions in the NICU.    Ms. Brarett and her partner had the chance to ask any questions and was encouraged to contact the NICU at that time if additional questions arise.      Thank you for the opportunity to participate in the care of this patient and please inform us of any changes in her status.

## 2021-10-03 VITALS
RESPIRATION RATE: 16 BRPM | DIASTOLIC BLOOD PRESSURE: 81 MMHG | TEMPERATURE: 98 F | OXYGEN SATURATION: 99 % | HEART RATE: 97 BPM | SYSTOLIC BLOOD PRESSURE: 126 MMHG

## 2021-10-03 LAB
BLD GP AB SCN SERPL QL: NEGATIVE — SIGNIFICANT CHANGE UP
CULTURE RESULTS: SIGNIFICANT CHANGE UP
RH IG SCN BLD-IMP: POSITIVE — SIGNIFICANT CHANGE UP
SPECIMEN SOURCE: SIGNIFICANT CHANGE UP

## 2021-10-03 PROCEDURE — 99232 SBSQ HOSP IP/OBS MODERATE 35: CPT

## 2021-10-03 RX ORDER — NIFEDIPINE 30 MG
1 TABLET, EXTENDED RELEASE 24 HR ORAL
Qty: 30 | Refills: 2
Start: 2021-10-03 | End: 2021-12-31

## 2021-10-03 RX ADMIN — SODIUM CHLORIDE 3 MILLILITER(S): 9 INJECTION INTRAMUSCULAR; INTRAVENOUS; SUBCUTANEOUS at 05:22

## 2021-10-03 NOTE — PROGRESS NOTE ADULT - SUBJECTIVE AND OBJECTIVE BOX
Maternal Fetal Medicine Consultation     HPI: Pt is a 31yo  at 28w4d with a history of cHTN who presented yesterday with severe persistent headaches, dizziness, and several severe range blood pressures at home and at work. Currently patient still reporting severe headache as well as dizziness upon ambulation. Additionally is complaining of lower back pain c/w history of herniated disk. To review pt has extensive history of PEC/sPEC and was diagnosed with cHTN outside of pregnancy - reports being on metoprolol intermittently and has mild range pressures at home and with her PCP. She also reports a long standing history of graves dz reports being on methimazole 10mg intermittently throughout her life and in this pregnancy but self discontinued the medication because it made her nauseous and has not followed up with endocrine since. Denies n/v/epigastric/ruq pain. +FM denies ctx/lof/vb.      Histories:  OBhx:   AB 2015  TOPx1 2015 with D&C    2007 7#0 IOL @37wks for PEC   10/27/2010 7#0   2017 10#0 PEC   2019 7#0 IOL @37wks for PEC/Mg   Gyn: none  Medical: Asthma, cHTN, Graves dz, Herniated disc  Surgical Hx: Abdominoplasty, hernia repair  and D&C  Meds: PNV, Albuterol prn    Physical Exam  ICU Vital Signs Last 24 Hrs  T(C): 36.7 (01 Oct 2021 13:43), Max: 37 (30 Sep 2021 22:59)  T(F): 98.1 (01 Oct 2021 13:43), Max: 98.6 (30 Sep 2021 22:59)  HR: 99 (01 Oct 2021 13:43) (83 - 103)  BP: 144/85 (01 Oct 2021 13:43) (114/89 - 163/93)  BP(mean): 86 (01 Oct 2021 00:45) (84 - 111)  RR: 17 (01 Oct 2021 13:43) (16 - 18)  SpO2: 99% (01 Oct 2021 13:43) (99% - 100%)  Gen: tired, laying in bed  Abd: soft non tender gravid                          11.0   7.66  )-----------( 247      ( 30 Sep 2021 19:48 )             33.0       136  |  102  |  7   ----------------------------<  86  3.8   |  20<L>  |  0.38<L>    Ca    9.2      30 Sep 2021 19:48    TPro  7.0  /  Alb  4.0  /  TBili  <0.2  /  DBili  x   /  AST  12  /  ALT  6   /  AlkPhos  69      UP/C 0.2  TFTs pending    MFM ATU Ultrasound today completed and pending read
R3 Antepartum Note, HD# (INCOMPLETE NOTE)    Interval events:    Patient seen and examined at bedside, no acute overnight events. No acute complaints. Pt reports +FM, denies LOF, VB, ctx, HA, epigastric pain, blurred vision, CP, SOB, N/V, fevers, and chills.    Vital Signs Last 24 Hours  T(C): 36.7 (10-02-21 @ 05:05), Max: 36.9 (10-01-21 @ 09:20)  HR: 100 (10-02-21 @ 07:17) (85 - 116)  BP: 139/79 (10-02-21 @ 07:17) (119/60 - 165/90)  RR: 16 (10-02-21 @ 07:17) (16 - 20)  SpO2: 99% (10-02-21 @ 07:17) (92% - 100%)    CAPILLARY BLOOD GLUCOSE          Physical Exam:  General: NAD  Abdomen: Soft, non-tender, gravid  Ext: No pain or swelling    NST reactive overnight    Labs:             11.2   12.86 )-----------( 276      ( 10-01 @ 21:02 )             33.1     10-01 @ 21:02    135  |  102  |  10  ----------------------------<  107  4.3   |  20  |  0.39    Ca    9.6      10-01 @ 21:02  Mg     5.20     10-02 @ 07:40    TPro  7.4  /  Alb  4.0  /  TBili  <0.2  /  DBili  x   /  AST  13  /  ALT  10  /  AlkPhos  75  10-01 @ 21:02    PT/INR - ( 10-01 @ 23:01 )   PT: 11.8 sec;   INR: 1.04 ratio    PTT - ( 10-01 @ 23:01 )  PTT:28.2 sec    Uric Acid: (10-01 @ 23:01)  --       Fibrinogen: (10-01 @ 23:01)  483      LDH: (10-01 @ 23:01)  --         MEDICATIONS  (STANDING):  aspirin  chewable 81 milliGRAM(s) Oral daily  diphenhydrAMINE 25 milliGRAM(s) Oral every 4 hours  folic acid 1 milliGRAM(s) Oral daily  influenza   Vaccine 0.5 milliLiter(s) IntraMuscular once  lactated ringers. 1000 milliLiter(s) (50 mL/Hr) IV Continuous <Continuous>  lidocaine   4% Patch 1 Patch Transdermal every 24 hours  magnesium sulfate Infusion 2 Gm/Hr (50 mL/Hr) IV Continuous <Continuous>  magnesium sulfate Infusion 2 Gm/Hr (50 mL/Hr) IV Continuous <Continuous>  NIFEdipine XL 30 milliGRAM(s) Oral daily  prenatal multivitamin 1 Tablet(s) Oral daily  sodium chloride 0.9% lock flush 3 milliLiter(s) IV Push every 8 hours    MEDICATIONS  (PRN):  ALBUTerol    90 MICROgram(s) HFA Inhaler 2 Puff(s) Inhalation every 6 hours PRN Shortness of Breath and/or Wheezing  metoclopramide Injectable 10 milliGRAM(s) IV Push every 8 hours PRN headache  
MFM Fellow    Patient seen at bedside w/ MFM attending Dr. Walden.  She denies RODRIGUEZ, visual disturbances, RUQ/epigastric pain, ctx, LOF, VB and reports normal fetal movements.    Vital Signs Last 24 Hrs  T(C): 36.9 (03 Oct 2021 09:17), Max: 37.4 (02 Oct 2021 21:38)  T(F): 98.4 (03 Oct 2021 09:17), Max: 99.3 (02 Oct 2021 21:38)  HR: 97 (03 Oct 2021 09:17) (84 - 113)  BP: 126/81 (03 Oct 2021 09:17) (125/76 - 155/95)  BP(mean): --  RR: 16 (03 Oct 2021 09:17) (16 - 18)  SpO2: 99% (03 Oct 2021 09:17) (97% - 100%)  GA: NAD, A+Ox3  Abd: soft, gravid, NT on palpation  Extrem: no calf tenderness  EFM: NST reactive w/o decelerations                          11.2   12.86 )-----------( 276      ( 01 Oct 2021 21:02 )             33.1     10-01    135  |  102  |  10  ----------------------------<  107<H>  4.3   |  20<L>  |  0.39<L>    Ca    9.6      01 Oct 2021 21:02  Mg     5.20     10-02    TPro  7.4  /  Alb  4.0  /  TBili  <0.2  /  DBili  x   /  AST  13  /  ALT  10  /  AlkPhos  75  10-01    PT/INR - ( 01 Oct 2021 23:01 )   PT: 11.8 sec;   INR: 1.04 ratio         PTT - ( 01 Oct 2021 23:01 )  PTT:28.2 sec    MEDICATIONS  (STANDING):  aspirin  chewable 81 milliGRAM(s) Oral daily  diphenhydrAMINE 25 milliGRAM(s) Oral every 4 hours  folic acid 1 milliGRAM(s) Oral daily  lactated ringers. 1000 milliLiter(s) (50 mL/Hr) IV Continuous <Continuous>  lidocaine   4% Patch 1 Patch Transdermal every 24 hours  NIFEdipine XL 30 milliGRAM(s) Oral daily  prenatal multivitamin 1 Tablet(s) Oral daily  sodium chloride 0.9% lock flush 3 milliLiter(s) IV Push every 8 hours    MEDICATIONS  (PRN):  ALBUTerol    90 MICROgram(s) HFA Inhaler 2 Puff(s) Inhalation every 6 hours PRN Shortness of Breath and/or Wheezing  metoclopramide Injectable 10 milliGRAM(s) IV Push every 8 hours PRN headache  
Patient seen and examined at bedside. Patient w/ continued severe headache 8/10.  Mild improvement with tylenol.  Patient continues to have dizziness w/ sitting and movement. Patient endorses good fetal movement. Patient is ambulating and tolerating regular diet. Denies CP, SOB, N/V, fevers, chills, or any other concerns.    Vital Signs Last 24 Hours  T(C): 36.7 (10-01-21 @ 13:43), Max: 37 (09-30-21 @ 22:59)  HR: 99 (10-01-21 @ 13:43) (83 - 103)  BP: 144/85 (10-01-21 @ 13:43) (114/89 - 163/93)  RR: 17 (10-01-21 @ 13:43) (16 - 18)  SpO2: 99% (10-01-21 @ 13:43) (99% - 100%)    I&O's Summary    30 Sep 2021 07:01  -  01 Oct 2021 07:00  --------------------------------------------------------  IN: 0 mL / OUT: 200 mL / NET: -200 mL    Physical Exam:  General: NAD  CV: RR  Lungs: breathing comfortably on RA  Abdomen: soft, gravid, non-tender  Ext: no pain or swelling    Labs:             11.0<L>  7.66  )-----------( 247      ( 09-30 @ 19:48 )             33.0<L>    MEDICATIONS  (STANDING):  aspirin  chewable 81 milliGRAM(s) Oral daily  betamethasone Injectable 12 milliGRAM(s) IntraMuscular every 24 hours  diphenhydrAMINE 25 milliGRAM(s) Oral every 4 hours  folic acid 1 milliGRAM(s) Oral daily  influenza   Vaccine 0.5 milliLiter(s) IntraMuscular once  prenatal multivitamin 1 Tablet(s) Oral daily  sodium chloride 0.9% lock flush 3 milliLiter(s) IV Push every 8 hours    MEDICATIONS  (PRN):  ALBUTerol    90 MICROgram(s) HFA Inhaler 2 Puff(s) Inhalation every 6 hours PRN Shortness of Breath and/or Wheezing  metoclopramide Injectable 10 milliGRAM(s) IV Push every 8 hours PRN headache  
R3 Antepartum Note, HD#3    Interval events: headache improved    Patient seen and examined at bedside, no acute overnight events. Headache resolved. No acute complaints. Pt reports +FM, denies LOF, VB, ctx, HA, epigastric pain, blurred vision, CP, SOB, N/V, fevers, and chills.    Vital Signs Last 24 Hrs  T(C): 36.8 (03 Oct 2021 01:06), Max: 37.4 (02 Oct 2021 21:38)  T(F): 98.2 (03 Oct 2021 01:06), Max: 99.3 (02 Oct 2021 21:38)  HR: 84 (03 Oct 2021 01:06) (84 - 116)  BP: 125/76 (03 Oct 2021 01:06) (125/76 - 155/95)  BP(mean): --  RR: 17 (03 Oct 2021 01:06) (16 - 18)  SpO2: 98% (03 Oct 2021 01:06) (92% - 100%)  CAPILLARY BLOOD GLUCOSE    Physical Exam:  General: NAD  Abdomen: Soft, non-tender, gravid  Ext: No pain or swelling    NST reactive overnight    Labs:             11.2   12.86 )-----------( 276      ( 10-01 @ 21:02 )             33.1     10-01 @ 21:02    135  |  102  |  10  ----------------------------<  107  4.3   |  20  |  0.39    Ca    9.6      10-01 @ 21:02  Mg     5.20     10-02 @ 07:40    TPro  7.4  /  Alb  4.0  /  TBili  <0.2  /  DBili  x   /  AST  13  /  ALT  10  /  AlkPhos  75  10-01 @ 21:02    PT/INR - ( 10-01 @ 23:01 )   PT: 11.8 sec;   INR: 1.04 ratio    PTT - ( 10-01 @ 23:01 )  PTT:28.2 sec    Uric Acid: (10-01 @ 23:01)  --       Fibrinogen: (10-01 @ 23:01)  483      LDH: (10-01 @ 23:01)  --       MEDICATIONS  (STANDING):  aspirin  chewable 81 milliGRAM(s) Oral daily  diphenhydrAMINE 25 milliGRAM(s) Oral every 4 hours  folic acid 1 milliGRAM(s) Oral daily  influenza   Vaccine 0.5 milliLiter(s) IntraMuscular once  lactated ringers. 1000 milliLiter(s) (50 mL/Hr) IV Continuous <Continuous>  lidocaine   4% Patch 1 Patch Transdermal every 24 hours  magnesium sulfate Infusion 2 Gm/Hr (50 mL/Hr) IV Continuous <Continuous>  magnesium sulfate Infusion 2 Gm/Hr (50 mL/Hr) IV Continuous <Continuous>  NIFEdipine XL 30 milliGRAM(s) Oral daily  prenatal multivitamin 1 Tablet(s) Oral daily  sodium chloride 0.9% lock flush 3 milliLiter(s) IV Push every 8 hours    MEDICATIONS  (PRN):  ALBUTerol    90 MICROgram(s) HFA Inhaler 2 Puff(s) Inhalation every 6 hours PRN Shortness of Breath and/or Wheezing  metoclopramide Injectable 10 milliGRAM(s) IV Push every 8 hours PRN headache

## 2021-10-03 NOTE — PROGRESS NOTE ADULT - ASSESSMENT
29 yo  at 28.6 wks GA presenting w/ elevated BPs at home to 180s systolic w/ severe headache and dizziness that has since improved.  Patient admitted for BP monitoring, currently favoring cHTN exacerbation.      #cHTN vs. r/o siPEC  -BP monitoring  -patient w/ hx of PEC x4  -pt w/ rx for PRN antihypertensive per PMD- likely reflective of cHTN  -CT head to r/o stroke given persistent headache  -24 hr urine collection: 149  -HELLP wnl  -Procardia 30 XL started  -normal to mild range blood pressures overnight    #Graves Disease  -self d/c methimazone  -TSH, T4, T3 sent f/u labs    #Fetal well being  -s/p beta (10/1-10/2)  -ATU scan: EFW 1465g cephalic presentation, posterior placenta, EDGAR 12.6  -f/u GBS  -BID NST    #Maternal well being  -reg diet  -SCDs    #Sciatica  -chronic pain consulted  -lidocaine path 4% x5 day 12 hours on/12 hours off    MGreenman PGY3    
A/P 29 yo P4 at 28w6d w/ cHTN and significant obstetrical history notable for preeclampsia in 3 prior pregnancies admitted for observation in the setting of r/o cHTN exacerbation vs. superimposed preeclampsia  - BP controlled after initiation of nifedipine 30mg XL, headache resolved, patient denies toxic symptoms, HELLP labs WNL, clinical picture most consistent with cHTN exacerbation, outpatient management can be considered and is reasonable with intensive maternal/fetal surveillance, would suggest initiating weekly BPP/NST and serial fetal growth surveillance in addition to monitoring maternal signs/symptoms of PEC, patient has BP cuff at home and states she is compliant with home BP monitoring   - fetal monitoring reviewed and reassuring, s/p ACS
A/P: 31 yo  at 28w4d h/o cHTN presenting with worsening BPs HA dizziness. Patient remains uncomfortable, blood pressures here in mild range with one non sustained severe range. Given her history of cHTN not currently on meds the clinical picture could be c/w worsening cHTN vs superimposed preeclampsia. This is supported by no significant lab abnormalities as well as a normal uric acid (which can be helpful in distinguishing b/w cHTN And superimposed PEC). However given concerning symptomatology rec CT head to r/o acute intracranial process. Additionally if worsening HA not improving w/ tylenol would likely represent severe feature of PEC. If reassured this not severe PEC can start procardia as described below for BP control. Additionally follow up TFTs as pt has not been taking recommended methamazole and we have no recent labs to review. Will cnt to follow    - If BPs in severe range or developing sxs of PEC please contact [facility] for further recs  - Please continue floor BP monitoring with patient upright in chair back supported  - Agree w/ BMTZ, 24hr urine protein   - If blood pressures remain systolic >150 or diastolic >100 can start    procardia 30mg XL qd   - Daily PEC labs  - non-contrast CT head stat  - Follow up TFTs  - follow up ATU fetal imaging  - Tylenol for headache, lidocaine patch for lower back      Patient seen with Dr. Walden ([facility] attending)    Chris Lomax M.D. PGY-5  Maternal Fetal Medicine Fellow  
29 yo  at 28.4 wks GA presenting w/ elevated BPs at home to 180s systolic w/ severe headache and dizziness that intermittently improves but has remained persistently uncomfortable.      #cHTN vs. r/o siPEC  -BP monitoring  -patient w/ hx of PEC x4  -pt w/ rx for PRN antihypertensive per PMD- likely reflective of cHTN  -CT head to r/o stroke given persistent headache  -24 hr urine collectino  -HELLP wnl    #Graves Disease  -self d/c methimazone  -TSH, T4, T3 sent f/u labs    #Fetal well being  -beta (10/1-)  -f/u ATU scan  -f/u GBS  -BID NST    #Maternal well being  -reg diet  -SCDs    #Sciatica  -chronic pain consulted  -lidocaine path 4% x5 day 12 hours on/12 hours off    MGreenman PGY3

## 2021-10-04 ENCOUNTER — NON-APPOINTMENT (OUTPATIENT)
Age: 31
End: 2021-10-04

## 2021-10-05 ENCOUNTER — NON-APPOINTMENT (OUTPATIENT)
Age: 31
End: 2021-10-05

## 2021-10-07 ENCOUNTER — APPOINTMENT (OUTPATIENT)
Dept: OBGYN | Facility: CLINIC | Age: 31
End: 2021-10-07
Payer: COMMERCIAL

## 2021-10-07 ENCOUNTER — NON-APPOINTMENT (OUTPATIENT)
Age: 31
End: 2021-10-07

## 2021-10-07 ENCOUNTER — ASOB RESULT (OUTPATIENT)
Age: 31
End: 2021-10-07

## 2021-10-07 PROCEDURE — 0502F SUBSEQUENT PRENATAL CARE: CPT

## 2021-10-07 PROCEDURE — 76819 FETAL BIOPHYS PROFIL W/O NST: CPT

## 2021-10-07 PROCEDURE — 59025 FETAL NON-STRESS TEST: CPT | Mod: 59

## 2021-10-14 ENCOUNTER — NON-APPOINTMENT (OUTPATIENT)
Age: 31
End: 2021-10-14

## 2021-10-14 ENCOUNTER — APPOINTMENT (OUTPATIENT)
Dept: OBGYN | Facility: CLINIC | Age: 31
End: 2021-10-14

## 2021-10-14 ENCOUNTER — APPOINTMENT (OUTPATIENT)
Dept: ANTEPARTUM | Facility: CLINIC | Age: 31
End: 2021-10-14

## 2021-10-15 ENCOUNTER — NON-APPOINTMENT (OUTPATIENT)
Age: 31
End: 2021-10-15

## 2021-10-15 ENCOUNTER — APPOINTMENT (OUTPATIENT)
Dept: OBGYN | Facility: CLINIC | Age: 31
End: 2021-10-15

## 2021-10-15 ENCOUNTER — APPOINTMENT (OUTPATIENT)
Dept: OBGYN | Facility: CLINIC | Age: 31
End: 2021-10-15
Payer: COMMERCIAL

## 2021-10-15 PROCEDURE — 0502F SUBSEQUENT PRENATAL CARE: CPT

## 2021-10-18 LAB — BACTERIA UR CULT: NORMAL

## 2021-10-21 ENCOUNTER — APPOINTMENT (OUTPATIENT)
Dept: ANTEPARTUM | Facility: CLINIC | Age: 31
End: 2021-10-21
Payer: COMMERCIAL

## 2021-10-21 ENCOUNTER — ASOB RESULT (OUTPATIENT)
Age: 31
End: 2021-10-21

## 2021-10-21 PROCEDURE — 76819 FETAL BIOPHYS PROFIL W/O NST: CPT

## 2021-10-21 PROCEDURE — 76816 OB US FOLLOW-UP PER FETUS: CPT

## 2021-11-04 ENCOUNTER — APPOINTMENT (OUTPATIENT)
Dept: ANTEPARTUM | Facility: CLINIC | Age: 31
End: 2021-11-04
Payer: COMMERCIAL

## 2021-11-04 ENCOUNTER — NON-APPOINTMENT (OUTPATIENT)
Age: 31
End: 2021-11-04

## 2021-11-04 PROCEDURE — 99215 OFFICE O/P EST HI 40 MIN: CPT | Mod: 95

## 2021-11-08 ENCOUNTER — NON-APPOINTMENT (OUTPATIENT)
Age: 31
End: 2021-11-08

## 2021-11-08 ENCOUNTER — ASOB RESULT (OUTPATIENT)
Age: 31
End: 2021-11-08

## 2021-11-08 ENCOUNTER — APPOINTMENT (OUTPATIENT)
Dept: OBGYN | Facility: CLINIC | Age: 31
End: 2021-11-08
Payer: COMMERCIAL

## 2021-11-08 VITALS
SYSTOLIC BLOOD PRESSURE: 148 MMHG | WEIGHT: 215 LBS | DIASTOLIC BLOOD PRESSURE: 103 MMHG | BODY MASS INDEX: 38.09 KG/M2 | HEIGHT: 63 IN

## 2021-11-08 VITALS — SYSTOLIC BLOOD PRESSURE: 132 MMHG | DIASTOLIC BLOOD PRESSURE: 88 MMHG

## 2021-11-08 DIAGNOSIS — Z23 ENCOUNTER FOR IMMUNIZATION: ICD-10-CM

## 2021-11-08 DIAGNOSIS — O16.9 UNSPECIFIED MATERNAL HYPERTENSION, UNSPECIFIED TRIMESTER: ICD-10-CM

## 2021-11-08 PROCEDURE — 0502F SUBSEQUENT PRENATAL CARE: CPT

## 2021-11-08 PROCEDURE — 59025 FETAL NON-STRESS TEST: CPT | Mod: 59

## 2021-11-08 PROCEDURE — 76819 FETAL BIOPHYS PROFIL W/O NST: CPT

## 2021-11-15 ENCOUNTER — INPATIENT (INPATIENT)
Facility: HOSPITAL | Age: 31
LOS: 3 days | Discharge: HOME | End: 2021-11-19
Attending: OBSTETRICS & GYNECOLOGY | Admitting: OBSTETRICS & GYNECOLOGY
Payer: COMMERCIAL

## 2021-11-15 VITALS
DIASTOLIC BLOOD PRESSURE: 93 MMHG | RESPIRATION RATE: 16 BRPM | TEMPERATURE: 100 F | SYSTOLIC BLOOD PRESSURE: 140 MMHG | HEART RATE: 126 BPM

## 2021-11-15 DIAGNOSIS — Z98.890 OTHER SPECIFIED POSTPROCEDURAL STATES: Chronic | ICD-10-CM

## 2021-11-15 DIAGNOSIS — Z98.89 OTHER SPECIFIED POSTPROCEDURAL STATES: Chronic | ICD-10-CM

## 2021-11-15 DIAGNOSIS — Z3A.00 WEEKS OF GESTATION OF PREGNANCY NOT SPECIFIED: ICD-10-CM

## 2021-11-15 DIAGNOSIS — O26.899 OTHER SPECIFIED PREGNANCY RELATED CONDITIONS, UNSPECIFIED TRIMESTER: ICD-10-CM

## 2021-11-15 LAB
ALBUMIN SERPL ELPH-MCNC: 3.4 G/DL — SIGNIFICANT CHANGE UP (ref 3.3–5)
ALP SERPL-CCNC: 96 U/L — SIGNIFICANT CHANGE UP (ref 40–120)
ALT FLD-CCNC: 11 U/L — SIGNIFICANT CHANGE UP (ref 4–33)
ANION GAP SERPL CALC-SCNC: 11 MMOL/L — SIGNIFICANT CHANGE UP (ref 7–14)
APPEARANCE UR: ABNORMAL
APTT BLD: 31.6 SEC — SIGNIFICANT CHANGE UP (ref 27–36.3)
AST SERPL-CCNC: 17 U/L — SIGNIFICANT CHANGE UP (ref 4–32)
BACTERIA # UR AUTO: ABNORMAL
BASOPHILS # BLD AUTO: 0.05 K/UL — SIGNIFICANT CHANGE UP (ref 0–0.2)
BASOPHILS NFR BLD AUTO: 0.9 % — SIGNIFICANT CHANGE UP (ref 0–2)
BILIRUB SERPL-MCNC: <0.2 MG/DL — SIGNIFICANT CHANGE UP (ref 0.2–1.2)
BILIRUB UR-MCNC: NEGATIVE — SIGNIFICANT CHANGE UP
BUN SERPL-MCNC: 4 MG/DL — LOW (ref 7–23)
CALCIUM SERPL-MCNC: 9 MG/DL — SIGNIFICANT CHANGE UP (ref 8.4–10.5)
CHLORIDE SERPL-SCNC: 105 MMOL/L — SIGNIFICANT CHANGE UP (ref 98–107)
CO2 SERPL-SCNC: 21 MMOL/L — LOW (ref 22–31)
COLOR SPEC: YELLOW — SIGNIFICANT CHANGE UP
CREAT ?TM UR-MCNC: 183 MG/DL — SIGNIFICANT CHANGE UP
CREAT SERPL-MCNC: 0.45 MG/DL — LOW (ref 0.5–1.3)
DIFF PNL FLD: NEGATIVE — SIGNIFICANT CHANGE UP
EOSINOPHIL # BLD AUTO: 0.12 K/UL — SIGNIFICANT CHANGE UP (ref 0–0.5)
EOSINOPHIL NFR BLD AUTO: 2.2 % — SIGNIFICANT CHANGE UP (ref 0–6)
EPI CELLS # UR: 9 /HPF — HIGH (ref 0–5)
FIBRINOGEN PPP-MCNC: 563 MG/DL — HIGH (ref 290–520)
GLUCOSE SERPL-MCNC: 120 MG/DL — HIGH (ref 70–99)
GLUCOSE UR QL: NEGATIVE — SIGNIFICANT CHANGE UP
HCT VFR BLD CALC: 32.1 % — LOW (ref 34.5–45)
HGB BLD-MCNC: 10.2 G/DL — LOW (ref 11.5–15.5)
HYALINE CASTS # UR AUTO: 1 /LPF — SIGNIFICANT CHANGE UP (ref 0–7)
IANC: 3.29 K/UL — SIGNIFICANT CHANGE UP (ref 1.5–8.5)
IMM GRANULOCYTES NFR BLD AUTO: 2.2 % — HIGH (ref 0–1.5)
INR BLD: 1.1 RATIO — SIGNIFICANT CHANGE UP (ref 0.88–1.16)
KETONES UR-MCNC: ABNORMAL
LDH SERPL L TO P-CCNC: 174 U/L — SIGNIFICANT CHANGE UP (ref 135–225)
LEUKOCYTE ESTERASE UR-ACNC: NEGATIVE — SIGNIFICANT CHANGE UP
LYMPHOCYTES # BLD AUTO: 1.01 K/UL — SIGNIFICANT CHANGE UP (ref 1–3.3)
LYMPHOCYTES # BLD AUTO: 18.2 % — SIGNIFICANT CHANGE UP (ref 13–44)
MCHC RBC-ENTMCNC: 28.3 PG — SIGNIFICANT CHANGE UP (ref 27–34)
MCHC RBC-ENTMCNC: 31.8 GM/DL — LOW (ref 32–36)
MCV RBC AUTO: 88.9 FL — SIGNIFICANT CHANGE UP (ref 80–100)
MONOCYTES # BLD AUTO: 0.95 K/UL — HIGH (ref 0–0.9)
MONOCYTES NFR BLD AUTO: 17.1 % — HIGH (ref 2–14)
NEUTROPHILS # BLD AUTO: 3.29 K/UL — SIGNIFICANT CHANGE UP (ref 1.8–7.4)
NEUTROPHILS NFR BLD AUTO: 59.4 % — SIGNIFICANT CHANGE UP (ref 43–77)
NITRITE UR-MCNC: NEGATIVE — SIGNIFICANT CHANGE UP
NRBC # BLD: 0 /100 WBCS — SIGNIFICANT CHANGE UP
NRBC # FLD: 0 K/UL — SIGNIFICANT CHANGE UP
PH UR: 7 — SIGNIFICANT CHANGE UP (ref 5–8)
PLATELET # BLD AUTO: 198 K/UL — SIGNIFICANT CHANGE UP (ref 150–400)
POTASSIUM SERPL-MCNC: 3.4 MMOL/L — LOW (ref 3.5–5.3)
POTASSIUM SERPL-SCNC: 3.4 MMOL/L — LOW (ref 3.5–5.3)
PROT ?TM UR-MCNC: 22 MG/DL — SIGNIFICANT CHANGE UP
PROT ?TM UR-MCNC: 22 MG/DL — SIGNIFICANT CHANGE UP
PROT SERPL-MCNC: 6.4 G/DL — SIGNIFICANT CHANGE UP (ref 6–8.3)
PROT UR-MCNC: ABNORMAL
PROT/CREAT UR-RTO: 0.1 RATIO — SIGNIFICANT CHANGE UP (ref 0–0.2)
PROTHROM AB SERPL-ACNC: 12.6 SEC — SIGNIFICANT CHANGE UP (ref 10.6–13.6)
RBC # BLD: 3.61 M/UL — LOW (ref 3.8–5.2)
RBC # FLD: 14.5 % — SIGNIFICANT CHANGE UP (ref 10.3–14.5)
RBC CASTS # UR COMP ASSIST: 3 /HPF — SIGNIFICANT CHANGE UP (ref 0–4)
SODIUM SERPL-SCNC: 137 MMOL/L — SIGNIFICANT CHANGE UP (ref 135–145)
SP GR SPEC: 1.02 — SIGNIFICANT CHANGE UP (ref 1–1.05)
URATE SERPL-MCNC: 4.8 MG/DL — SIGNIFICANT CHANGE UP (ref 2.5–7)
UROBILINOGEN FLD QL: SIGNIFICANT CHANGE UP
WBC # BLD: 5.54 K/UL — SIGNIFICANT CHANGE UP (ref 3.8–10.5)
WBC # FLD AUTO: 5.54 K/UL — SIGNIFICANT CHANGE UP (ref 3.8–10.5)
WBC UR QL: 5 /HPF — SIGNIFICANT CHANGE UP (ref 0–5)

## 2021-11-15 RX ORDER — LABETALOL HCL 100 MG
200 TABLET ORAL ONCE
Refills: 0 | Status: COMPLETED | OUTPATIENT
Start: 2021-11-15 | End: 2021-11-15

## 2021-11-15 RX ORDER — ACETAMINOPHEN 500 MG
1000 TABLET ORAL ONCE
Refills: 0 | Status: COMPLETED | OUTPATIENT
Start: 2021-11-15 | End: 2021-11-15

## 2021-11-15 RX ORDER — ALBUTEROL 90 UG/1
1 AEROSOL, METERED ORAL EVERY 6 HOURS
Refills: 0 | Status: DISCONTINUED | OUTPATIENT
Start: 2021-11-15 | End: 2021-11-19

## 2021-11-15 RX ADMIN — Medication 1000 MILLIGRAM(S): at 21:50

## 2021-11-15 RX ADMIN — ALBUTEROL 1 PUFF(S): 90 AEROSOL, METERED ORAL at 23:37

## 2021-11-15 RX ADMIN — Medication 1000 MILLIGRAM(S): at 21:35

## 2021-11-15 RX ADMIN — Medication 200 MILLIGRAM(S): at 20:24

## 2021-11-15 NOTE — OB RN TRIAGE NOTE - CHIEF COMPLAINT QUOTE
I fell @ I fell @ 1800 11/14 on my butt.  I also have a cough since Saturday & today aches & chills.  I am vaccinated.

## 2021-11-15 NOTE — OB PROVIDER TRIAGE NOTE - NSHPLABSRESULTS_GEN_ALL_CORE
10.2   5.54  )-----------( 198      ( 15 Nov 2021 20:54 )             32.1   11-15    137  |  105  |  4<L>  ----------------------------<  120<H>  3.4<L>   |  21<L>  |  0.45<L>    Ca    9.0      15 Nov 2021 20:54    TPro  6.4  /  Alb  3.4  /  TBili  <0.2  /  DBili  x   /  AST  17  /  ALT  11  /  AlkPhos  96  11-15  PT/INR - ( 15 Nov 2021 20:54 )   PT: 12.6 sec;   INR: 1.10 ratio         PTT - ( 15 Nov 2021 20:54 )  PTT:31.6 sec  Urinalysis Basic - ( 15 Nov 2021 20:54 )    Color: Yellow / Appearance: Slightly Turbid / S.019 / pH: x  Gluc: x / Ketone: Small  / Bili: Negative / Urobili: <2 mg/dL   Blood: x / Protein: 30 mg/dL / Nitrite: Negative   Leuk Esterase: Negative / RBC: 3 /HPF / WBC 5 /HPF   Sq Epi: x / Non Sq Epi: 9 /HPF / Bacteria: Few    pcr 0.1 10.2   5.54  )-----------( 198      ( 15 Nov 2021 20:54 )             32.1   11-15    137  |  105  |  4<L>  ----------------------------<  120<H>  3.4<L>   |  21<L>  |  0.45<L>    Ca    9.0      15 Nov 2021 20:54    TPro  6.4  /  Alb  3.4  /  TBili  <0.2  /  DBili  x   /  AST  17  /  ALT  11  /  AlkPhos  96  11-15  PT/INR - ( 15 Nov 2021 20:54 )   PT: 12.6 sec;   INR: 1.10 ratio         PTT - ( 15 Nov 2021 20:54 )  PTT:31.6 sec  Urinalysis Basic - ( 15 Nov 2021 20:54 )    Color: Yellow / Appearance: Slightly Turbid / S.019 / pH: x  Gluc: x / Ketone: Small  / Bili: Negative / Urobili: <2 mg/dL   Blood: x / Protein: 30 mg/dL / Nitrite: Negative   Leuk Esterase: Negative / RBC: 3 /HPF / WBC 5 /HPF   Sq Epi: x / Non Sq Epi: 9 /HPF / Bacteria: Few    pcr 0.1  SARS-CoV-2: Detected (15 Nov 2021 21:03)

## 2021-11-15 NOTE — OB RN TRIAGE NOTE - NSICDXPASTMEDICALHX_GEN_ALL_CORE_FT
PAST MEDICAL HISTORY:  Hypertension in pregnancy, preeclampsia      PAST MEDICAL HISTORY:  Asthma mild no intubations    Hypertension in pregnancy, preeclampsia

## 2021-11-15 NOTE — OB RN TRIAGE NOTE - CURRENT PREGNANCY COMPLICATIONS, OB PROFILE
Hypertensive Disorder Gestational Age less than 36 Weeks/Hypertensive Disorder s/p Beta 9/30-10/1/Gestational Age less than 36 Weeks/Hypertensive Disorder

## 2021-11-15 NOTE — OB RN TRIAGE NOTE - BP NONINVASIVE SYSTOLIC (MM HG)
Problem: Pain:  Goal: Pain level will decrease  Description  Pain level will decrease  1/4/2020 1618 by Juan Gonzalez RN  Outcome: Ongoing  1/4/2020 0643 by Frances Pierce RN  Outcome: Ongoing  Goal: Control of acute pain  Description  Control of acute pain  1/4/2020 1618 by Juan Gonzalez RN  Outcome: Ongoing  1/4/2020 0643 by Frances Pierce RN  Outcome: Ongoing  Goal: Control of chronic pain  Description  Control of chronic pain  1/4/2020 1618 by Juan Gonzalez RN  Outcome: Ongoing  1/4/2020 0643 by Frances Pierce RN  Outcome: Ongoing     Problem: Falls - Risk of:  Goal: Will remain free from falls  Description  Will remain free from falls  1/4/2020 1618 by Juan Gonzalez RN  Outcome: Ongoing  1/4/2020 0643 by Frances Pierce RN  Outcome: Ongoing  Goal: Absence of physical injury  Description  Absence of physical injury  1/4/2020 1618 by Juan Gonzalez RN  Outcome: Ongoing  1/4/2020 0643 by Frances Pierce RN  Outcome: Ongoing
Problem: Pain:  Goal: Pain level will decrease  Description  Pain level will decrease  Outcome: Ongoing  Goal: Control of acute pain  Description  Control of acute pain  Outcome: Ongoing  Goal: Control of chronic pain  Description  Control of chronic pain  Outcome: Ongoing     Problem: Falls - Risk of:  Goal: Will remain free from falls  Description  Will remain free from falls  Outcome: Ongoing  Goal: Absence of physical injury  Description  Absence of physical injury  Outcome: Ongoing
140

## 2021-11-15 NOTE — OB PROVIDER TRIAGE NOTE - NSHPPHYSICALEXAM_GEN_ALL_CORE
LS bilaterally lower base wheezing, RLL diminished compared to LLL  abd soft gravid NT, scar lower transverse from abdominoplasty 2020  CV RRR  SVE: 0.5/30/-3  TAS:  vertex  anterior placenta  BPP 8/8  EDGAR: 8.96  FHT:  toco: none  Vital Signs Last 24 Hrs  T(C): 38.0 (15 Nov 2021 21:11), Max: 38.0 (15 Nov 2021 21:11)  T(F): 100.4 (15 Nov 2021 21:11), Max: 100.4 (15 Nov 2021 21:11)  HR: 103 (15 Nov 2021 22:43) (99 - 130)  BP: 129/77 (15 Nov 2021 22:37) (118/70 - 149/87)  BP(mean): --  RR: 16 (15 Nov 2021 19:41) (16 - 16)  SpO2: 94% (15 Nov 2021 22:17) (92% - 97%) LS bilaterally lower base wheezing, RLL diminished compared to LLL  abd soft gravid NT, scar lower transverse from abdominoplasty 2020  CV RRR  SVE: 0.5/30/-3  TAS:  vertex  anterior placenta  BPP 8/8  EDGAR: 8.96  FHT:moderate variability, + accels, negative decels  toco: none  Vital Signs Last 24 Hrs  T(C): 38.0 (15 Nov 2021 21:11), Max: 38.0 (15 Nov 2021 21:11)  T(F): 100.4 (15 Nov 2021 21:11), Max: 100.4 (15 Nov 2021 21:11)  HR: 103 (15 Nov 2021 22:43) (99 - 130)  BP: 129/77 (15 Nov 2021 22:37) (118/70 - 149/87)  BP(mean): --  RR: 16 (15 Nov 2021 19:41) (16 - 16)  SpO2: 94% (15 Nov 2021 22:17) (92% - 97%)

## 2021-11-15 NOTE — OB PROVIDER TRIAGE NOTE - HISTORY OF PRESENT ILLNESS
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 AP course gestational htn admitted 21 s/p 2 doses beta, hx of 3 previous pregnancies with PEC. reports fever of 100.3 today, + chills, + body aches, +n/v on the way here, + coughing dry cough feels she is wheezing more and using her inhaler q 4 hrs compared to her norm of once a day, not eating or drinking today as per patient. denies dysuria ha new swelling vision changes or cp. last saw OB Monday. pt wit + sick contacts her  and kids have been sick-not tested for covid 19, and her father in law is in the hospital with Covid 19.    GBS: negative from 21  meds: PNV Albuterol Labetalol 200 mg 2 x day (was on procardia til last week and felt weak), zinc magnesium  All: denies  PMH: asthma this pregnancy only graves disease herniated discs  PSH: abdominoplasty and hernia 2020, d&c 2014  gyn hx: denies  ob hx:  2007 PEC   2010 7#   2017 PEC   2019 PEC s/p magnesium  TOP d&c

## 2021-11-15 NOTE — OB PROVIDER TRIAGE NOTE - NSOBPROVIDERNOTE_OBGYN_ALL_OB_FT
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 AP course gestational htn admitted 21 s/p 2 doses beta, hx of 3 previous pregnancies with PEC. reports fever of 100.3 today, + chills, + body aches, +n/v on the way here, + coughing dry cough feels she is wheezing more and using her inhaler q 4 hrs compared to her norm of once a day, not eating or drinking today as per patient. denies dysuria ha new swelling vision changes or cp. last saw OB Monday. pt wit + sick contacts her  and kids have been sick-not tested for covid 19, and her father in law is in the hospital with Covid 19.    GBS: negative from 21  meds: PNV Albuterol Labetalol 200 mg 2 x day (was on procardia til last week and felt weak), zinc magnesium  All: denies  PMH: asthma this pregnancy only graves disease herniated discs  PSH: abdominoplasty and hernia 2020, d&c 2014  gyn hx: denies  ob hx:  2007 PEC   2010 7#   2017 PEC   2019 PEC s/p magnesium  TOP d&c     d/w Dr Hou and agrees with plan of:  RVP panel for cough fever chills aches + sick contacts- kids  and father in law( in hospital for covid 19)  Tylenol for Temp 38 rectal  Labetalol 200mg as prescribed pt due for her evening dose  HELLP labs for labile BPs   pulse ox  CXR for hx of asthma cough wheezing  Proventil inhaler ordered for asthma  oral hydration- pt not eating and drinking vomited on way here    d/w Dr Hou admit for observation + covid 19, to monitor pulse ox and 02 @ 35 wks  continuous pulse ox monitoring  oxygen therapy  continuous NST

## 2021-11-15 NOTE — OB PROVIDER TRIAGE NOTE - NS_OBGYNHISTORY_OBGYN_ALL_OB_FT
2007 PEC   2010 7#   2017 PEC    2019 PEC   top with d&c 2014  2007 PEC   2010 7#   2017 PEC    2019 PEC  top with d&c 2014

## 2021-11-16 ENCOUNTER — NON-APPOINTMENT (OUTPATIENT)
Age: 31
End: 2021-11-16

## 2021-11-16 DIAGNOSIS — U07.1 COVID-19: ICD-10-CM

## 2021-11-16 LAB
ALBUMIN SERPL ELPH-MCNC: 3.3 G/DL — SIGNIFICANT CHANGE UP (ref 3.3–5)
ALP SERPL-CCNC: 91 U/L — SIGNIFICANT CHANGE UP (ref 40–120)
ALT FLD-CCNC: 8 U/L — SIGNIFICANT CHANGE UP (ref 4–33)
AST SERPL-CCNC: 16 U/L — SIGNIFICANT CHANGE UP (ref 4–32)
B PERT DNA SPEC QL NAA+PROBE: SIGNIFICANT CHANGE UP
B PERT+PARAPERT DNA PNL SPEC NAA+PROBE: SIGNIFICANT CHANGE UP
BILIRUB DIRECT SERPL-MCNC: <0.2 MG/DL — SIGNIFICANT CHANGE UP (ref 0–0.2)
BILIRUB INDIRECT FLD-MCNC: SIGNIFICANT CHANGE UP MG/DL (ref 0–1)
BILIRUB SERPL-MCNC: <0.2 MG/DL — SIGNIFICANT CHANGE UP (ref 0.2–1.2)
BLD GP AB SCN SERPL QL: NEGATIVE — SIGNIFICANT CHANGE UP
BORDETELLA PARAPERTUSSIS (RAPRVP): SIGNIFICANT CHANGE UP
C PNEUM DNA SPEC QL NAA+PROBE: SIGNIFICANT CHANGE UP
COVID-19 SPIKE DOMAIN AB INTERP: NEGATIVE — SIGNIFICANT CHANGE UP
COVID-19 SPIKE DOMAIN ANTIBODY RESULT: 0.4 U/ML — SIGNIFICANT CHANGE UP
CREAT SERPL-MCNC: 0.43 MG/DL — LOW (ref 0.5–1.3)
FLUAV SUBTYP SPEC NAA+PROBE: SIGNIFICANT CHANGE UP
FLUBV RNA SPEC QL NAA+PROBE: SIGNIFICANT CHANGE UP
HADV DNA SPEC QL NAA+PROBE: SIGNIFICANT CHANGE UP
HCOV 229E RNA SPEC QL NAA+PROBE: SIGNIFICANT CHANGE UP
HCOV HKU1 RNA SPEC QL NAA+PROBE: SIGNIFICANT CHANGE UP
HCOV NL63 RNA SPEC QL NAA+PROBE: SIGNIFICANT CHANGE UP
HCOV OC43 RNA SPEC QL NAA+PROBE: SIGNIFICANT CHANGE UP
HMPV RNA SPEC QL NAA+PROBE: SIGNIFICANT CHANGE UP
HPIV1 RNA SPEC QL NAA+PROBE: SIGNIFICANT CHANGE UP
HPIV2 RNA SPEC QL NAA+PROBE: SIGNIFICANT CHANGE UP
HPIV3 RNA SPEC QL NAA+PROBE: SIGNIFICANT CHANGE UP
HPIV4 RNA SPEC QL NAA+PROBE: SIGNIFICANT CHANGE UP
INR BLD: 1.11 RATIO — SIGNIFICANT CHANGE UP (ref 0.88–1.16)
M PNEUMO DNA SPEC QL NAA+PROBE: SIGNIFICANT CHANGE UP
PROT SERPL-MCNC: 6.3 G/DL — SIGNIFICANT CHANGE UP (ref 6–8.3)
PROTHROM AB SERPL-ACNC: 12.6 SEC — SIGNIFICANT CHANGE UP (ref 10.6–13.6)
RAPID RVP RESULT: DETECTED
RH IG SCN BLD-IMP: POSITIVE — SIGNIFICANT CHANGE UP
RSV RNA SPEC QL NAA+PROBE: SIGNIFICANT CHANGE UP
RV+EV RNA SPEC QL NAA+PROBE: DETECTED
SARS-COV-2 IGG+IGM SERPL QL IA: 0.4 U/ML — SIGNIFICANT CHANGE UP
SARS-COV-2 IGG+IGM SERPL QL IA: NEGATIVE — SIGNIFICANT CHANGE UP
SARS-COV-2 RNA SPEC QL NAA+PROBE: DETECTED
T PALLIDUM AB TITR SER: NEGATIVE — SIGNIFICANT CHANGE UP

## 2021-11-16 PROCEDURE — 99221 1ST HOSP IP/OBS SF/LOW 40: CPT | Mod: GC

## 2021-11-16 RX ORDER — LABETALOL HCL 100 MG
200 TABLET ORAL
Refills: 0 | Status: DISCONTINUED | OUTPATIENT
Start: 2021-11-16 | End: 2021-11-16

## 2021-11-16 RX ORDER — BENZOCAINE AND MENTHOL 5; 1 G/100ML; G/100ML
1 LIQUID ORAL
Refills: 0 | Status: DISCONTINUED | OUTPATIENT
Start: 2021-11-16 | End: 2021-11-17

## 2021-11-16 RX ORDER — ACETAMINOPHEN 500 MG
975 TABLET ORAL ONCE
Refills: 0 | Status: COMPLETED | OUTPATIENT
Start: 2021-11-16 | End: 2021-11-16

## 2021-11-16 RX ORDER — ALBUTEROL 90 UG/1
1 AEROSOL, METERED ORAL EVERY 6 HOURS
Refills: 0 | Status: DISCONTINUED | OUTPATIENT
Start: 2021-11-16 | End: 2021-11-19

## 2021-11-16 RX ORDER — DEXAMETHASONE 0.5 MG/5ML
6 ELIXIR ORAL DAILY
Refills: 0 | Status: DISCONTINUED | OUTPATIENT
Start: 2021-11-16 | End: 2021-11-19

## 2021-11-16 RX ORDER — NIFEDIPINE 30 MG
30 TABLET, EXTENDED RELEASE 24 HR ORAL DAILY
Refills: 0 | Status: DISCONTINUED | OUTPATIENT
Start: 2021-11-16 | End: 2021-11-16

## 2021-11-16 RX ORDER — LABETALOL HCL 100 MG
200 TABLET ORAL EVERY 12 HOURS
Refills: 0 | Status: DISCONTINUED | OUTPATIENT
Start: 2021-11-16 | End: 2021-11-19

## 2021-11-16 RX ORDER — REMDESIVIR 5 MG/ML
INJECTION INTRAVENOUS
Refills: 0 | Status: DISCONTINUED | OUTPATIENT
Start: 2021-11-16 | End: 2021-11-19

## 2021-11-16 RX ORDER — REMDESIVIR 5 MG/ML
100 INJECTION INTRAVENOUS EVERY 24 HOURS
Refills: 0 | Status: DISCONTINUED | OUTPATIENT
Start: 2021-11-17 | End: 2021-11-19

## 2021-11-16 RX ORDER — HEPARIN SODIUM 5000 [USP'U]/ML
5000 INJECTION INTRAVENOUS; SUBCUTANEOUS EVERY 12 HOURS
Refills: 0 | Status: DISCONTINUED | OUTPATIENT
Start: 2021-11-16 | End: 2021-11-19

## 2021-11-16 RX ORDER — REMDESIVIR 5 MG/ML
200 INJECTION INTRAVENOUS EVERY 24 HOURS
Refills: 0 | Status: COMPLETED | OUTPATIENT
Start: 2021-11-16 | End: 2021-11-16

## 2021-11-16 RX ORDER — ACETAMINOPHEN 500 MG
975 TABLET ORAL EVERY 6 HOURS
Refills: 0 | Status: DISCONTINUED | OUTPATIENT
Start: 2021-11-16 | End: 2021-11-19

## 2021-11-16 RX ADMIN — Medication 200 MILLIGRAM(S): at 23:06

## 2021-11-16 RX ADMIN — Medication 975 MILLIGRAM(S): at 21:30

## 2021-11-16 RX ADMIN — Medication 975 MILLIGRAM(S): at 12:00

## 2021-11-16 RX ADMIN — Medication 6 MILLIGRAM(S): at 12:50

## 2021-11-16 RX ADMIN — HEPARIN SODIUM 5000 UNIT(S): 5000 INJECTION INTRAVENOUS; SUBCUTANEOUS at 18:14

## 2021-11-16 RX ADMIN — REMDESIVIR 500 MILLIGRAM(S): 5 INJECTION INTRAVENOUS at 10:05

## 2021-11-16 RX ADMIN — Medication 200 MILLIGRAM(S): at 11:53

## 2021-11-16 RX ADMIN — BENZOCAINE AND MENTHOL 1 LOZENGE: 5; 1 LIQUID ORAL at 11:54

## 2021-11-16 RX ADMIN — Medication 975 MILLIGRAM(S): at 20:49

## 2021-11-16 RX ADMIN — Medication 975 MILLIGRAM(S): at 12:38

## 2021-11-16 RX ADMIN — BENZOCAINE AND MENTHOL 1 LOZENGE: 5; 1 LIQUID ORAL at 20:04

## 2021-11-16 RX ADMIN — HEPARIN SODIUM 5000 UNIT(S): 5000 INJECTION INTRAVENOUS; SUBCUTANEOUS at 06:45

## 2021-11-16 NOTE — CHART NOTE - NSCHARTNOTEFT_GEN_A_CORE
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 Pt reports that she was previously unwell 1 week ago, however, improved at home. After having new onset chills and father-in-law hospitalized +COVID, she presented for evaluation. Denies CP, SOB, HA, vision changes. Reports some epigastric discomfort with stress; otherwise denies. Reports recent increase in wheezing.     Pt was previously admitted -10/3 w/ siPEC vs cHTN exacerbation; she received BMZ -10/1 and was discharged on Labetalol 200mg BID. She reports previously being on Procardia, however, had "chest pain and decreased fetal movement"     Per chart review:     PNC: Minerva. No reported genetic/sono abnl.   - admitted -10/3 w/ siPEC vs cHTN exacerbation    OBGYN Hx:   SAB   TOPx1 2015 with D&C    2007 7#0 IOL @37wks for PEC   10/27/2010 7#0   2017 10#0 PEC   2019 7#0 IOL @37wks for PEC/Mg     GYNHx: denies    PMSH: Asthma, Graves, Herniated Disc, s/p Abdominoplasty, Hernia Repair, D&C     Meds: PNV, Labetalol 200 BID, Albuterol PRN  All: NKDA    Soc: denies T/E/D  Psych: denies    Will accept blood products.     Vital Signs Last 24 Hrs  T(C): 38.0 (15 Nov 2021 21:11), Max: 38.0 (15 Nov 2021 21:11)  T(F): 100.4 (15 Nov 2021 21:11), Max: 100.4 (15 Nov 2021 21:11)  HR: 92 (2021 05:46) (77 - 130)  BP: 119/- (2021 05:44) (100/59 - 151/78)  RR: 16 (15 Nov 2021 19:41) (16 - 16)  SpO2: 93% (2021 03:51) (92% - 98%)    /mod/+accel/-decel  [place] acontractile  SVE documented 0.5/30/-3    Gen: awake, alert, NAD  Chest: nonlabored breathing, 2L NC  Abd: soft, nontender, gravid  LE: nontender    TAUS: VTX, anterior, 8.96, BPP 8/8               10.2   5.54  )-----------( 198      ( 11-15 @ 20:54 )             32.1     11-15 @ 20:54    137  |  105  |  4   ----------------------------<  120  3.4   |  21  |  0.45    Ca    9.0      11-15 @ 20:54    TPro  6.4  /  Alb  3.4  /  TBili  <0.2  /  DBili  x   /  AST  17  /  ALT  11  /  AlkPhos  96  11-15 @ 20:54    PT/INR - (  @ 05:28 )   PT: 12.6 sec;   INR: 1.11 ratio    PTT - ( 11-15 @ 20:54 )  PTT:31.6 sec    Uric Acid: (11-15 @ 20:54)  4.8      Fibrinogen: (11-15 @ 20:54)  563      LDH: (11-15 @ 20:54)  174      Urinalysis Basic - ( 15 Nov 2021 20:54 )    Color: Yellow / Appearance: Slightly Turbid / S.019 / pH: x  Gluc: x / Ketone: Small  / Bili: Negative / Urobili: <2 mg/dL   Blood: x / Protein: 30 mg/dL / Nitrite: Negative   Leuk Esterase: Negative / RBC: 3 /HPF / WBC 5 /HPF   Sq Epi: x / Non Sq Epi: 9 /HPF / Bacteria: Few    Respiratory Viral Panel with COVID-19 by DARYN (11.15.21 @ 21:03)   Rapid RVP Result: Detected   **** SARS-CoV-2: Detected: ****  This Respiratory Panel uses polymerase chain reaction (PCR) to detect for   adenovirus; coronavirus (HKU1, NL63, 229E, OC43); human metapneumovirus   (hMPV); human enterovirus/rhinovirus (Entero/RV); influenza A; influenza   A/H1; influenza A/H3; influenza A/H1-2009; influenza B; parainfluenza   viruses 1, 2, 3, 4; respiratory syncytial virus; Mycoplasma pneumoniae;   Chlamydophila pneumoniae; and SARS-CoV-2.   Adenovirus (RapRVP): NotDetec   Influenza A (RapRVP): NotDetec   Influenza B (RapRVP): NotDetec   Parainfluenza 1 (RapRVP): NotDetec   Parainfluenza 2 (RapRVP): NotDetec   Parainfluenza 3 (RapRVP): NotDetec   Parainfluenza 4 (RapRVP): NotDetec   Resp Syncytial Virus (RapRVP): NotDetec   Bordetella pertussis (RapRVP): NotDetec   Bordetella parapertussis (RapRVP): NotDetec   Chlamydia pneumoniae (RapRVP): NotDetec   Mycoplasma pneumoniae (RapRVP): NotDetec   **** Entero/Rhinovirus (RapRVP): Detected ****  HKU1 Coronavirus (RapRVP): NotDetec   NL63 Coronavirus (RapRVP): NotDetec   229E Coronavirus (RapRVP): NotDetec   OC43 Coronavirus (RapRVP): NotDetec   hMPV (RapRVP): NotDetec     < from: Xray Chest 1 View- PORTABLE-Urgent (Xray Chest 1 View- PORTABLE-Urgent .) (11.15.21 @ 22:23) >      ******PRELIMINARY REPORT******    ******PRELIMINARY REPORT******            EXAM:  XR CHEST PORTABLE URGENT 1V        PROCEDURE DATE:  Nov 15 2021     ******PRELIMINARY REPORT******    ******PRELIMINARY REPORT******            INTERPRETATION:  CLINICAL INDICATION: Pregnant with cough, chills, and aches. History of asthma.    EXAM: Frontal radiograph of the chest.    COMPARISON: Chest radiograph from 3/7/2021.    FINDINGS:  The lungs are clear.  There is no pleural effusion or pneumothorax.  Theheart size is enlarged.  The visualized osseous structures demonstrate no acute pathology.    IMPRESSION:  Clear lungs.      ******PRELIMINARY REPORT******    ******PRELIMINARY REPORT******          EVA AVILA MD; Resident Radiology    < end of copied text >      32yo  @35w (EDIE 21) p/w c/o fevers, chills and known +COVID sick contacts in family. Workup notable for RVP +COVID, Entero/Rhinovirus. O2 sat <95% at rest, improves to 100% on 2L. Pt admitted for +COVID requiring O2. Maternal/fetal status stable overnight.    #COVID  - Cont pO2  - Maintain O2 saturation >95% in the setting of pregnancy  - Reviewed rec for Remdesivir; pt declining at this time  - Reviewed rec for Dexamethasone 2/2 O2 sat requirement  []Consult ID in AM    #Fetal wellbeing  - Cont FHR  - BPP  on admission  - MFM consult    #Maternal wellbeing  - Reg diet. SLIV.  - VTE ppx: HSQ, SCDs, OOB     seen w/Dr. 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 Pt reports that she was previously unwell 1 week ago, however, improved at home. After having new onset chills and father-in-law hospitalized +COVID, she presented for evaluation. Denies CP, SOB, HA, vision changes. Reports some epigastric discomfort with stress; otherwise denies. Reports recent increase in wheezing.     Pt was previously admitted -10/3 w/ siPEC vs cHTN exacerbation; she received BMZ -10/1 and was discharged on Labetalol 200mg BID. She reports previously being on Procardia, however, had "chest pain and decreased fetal movement"     Per chart review:   COVID vaccinated.  PNC: Minerva. No reported genetic/sono abnl.   - admitted -10/3 w/ siPEC vs cHTN exacerbation    OBGYN Hx:   SAB   TOPx1 2015 with D&C    2007 7#0 IOL @37wks for PEC   10/27/2010 7#0   2017 10#0 PEC   2019 7#0 IOL @37wks for PEC/Mg     GYNHx: denies    PMSH: Asthma, Graves, Herniated Disc, s/p Abdominoplasty, Hernia Repair, D&C     Meds: PNV, Labetalol 200 BID, Albuterol PRN  All: NKDA    Soc: denies T/E/D  Psych: denies    Will accept blood products.     Vital Signs Last 24 Hrs  T(C): 38.0 (15 Nov 2021 21:11), Max: 38.0 (15 Nov 2021 21:11)  T(F): 100.4 (15 Nov 2021 21:11), Max: 100.4 (15 Nov 2021 21:11)  HR: 92 (2021 05:46) (77 - 130)  BP: 119/- (2021 05:44) (100/59 - 151/78)  RR: 16 (15 Nov 2021 19:41) (16 - 16)  SpO2: 93% (2021 03:51) (92% - 98%)    /mod/+accel/-decel  [place] acontractile  SVE documented 0.5/30/-3    Gen: awake, alert, NAD  Chest: nonlabored breathing, 2L NC  Abd: soft, nontender, gravid  LE: nontender    TAUS: VTX, anterior, 8.96, BPP 8/8               10.2   5.54  )-----------( 198      ( 11-15 @ 20:54 )             32.1     11-15 @ 20:54    137  |  105  |  4   ----------------------------<  120  3.4   |  21  |  0.45    Ca    9.0      11-15 @ 20:54    TPro  6.4  /  Alb  3.4  /  TBili  <0.2  /  DBili  x   /  AST  17  /  ALT  11  /  AlkPhos  96  11-15 @ 20:54    PT/INR - (  @ 05:28 )   PT: 12.6 sec;   INR: 1.11 ratio    PTT - ( 11-15 @ 20:54 )  PTT:31.6 sec    Uric Acid: (11-15 @ 20:54)  4.8      Fibrinogen: (11-15 @ 20:54)  563      LDH: (11-15 @ 20:54)  174      Urinalysis Basic - ( 15 Nov 2021 20:54 )    Color: Yellow / Appearance: Slightly Turbid / S.019 / pH: x  Gluc: x / Ketone: Small  / Bili: Negative / Urobili: <2 mg/dL   Blood: x / Protein: 30 mg/dL / Nitrite: Negative   Leuk Esterase: Negative / RBC: 3 /HPF / WBC 5 /HPF   Sq Epi: x / Non Sq Epi: 9 /HPF / Bacteria: Few    Respiratory Viral Panel with COVID-19 by DARYN (11.15.21 @ 21:03)   Rapid RVP Result: Detected   **** SARS-CoV-2: Detected: ****  This Respiratory Panel uses polymerase chain reaction (PCR) to detect for   adenovirus; coronavirus (HKU1, NL63, 229E, OC43); human metapneumovirus   (hMPV); human enterovirus/rhinovirus (Entero/RV); influenza A; influenza   A/H1; influenza A/H3; influenza A/H1-2009; influenza B; parainfluenza   viruses 1, 2, 3, 4; respiratory syncytial virus; Mycoplasma pneumoniae;   Chlamydophila pneumoniae; and SARS-CoV-2.   Adenovirus (RapRVP): NotDetec   Influenza A (RapRVP): NotDetec   Influenza B (RapRVP): NotDetec   Parainfluenza 1 (RapRVP): NotDetec   Parainfluenza 2 (RapRVP): NotDetec   Parainfluenza 3 (RapRVP): NotDetec   Parainfluenza 4 (RapRVP): NotDetec   Resp Syncytial Virus (RapRVP): NotDetec   Bordetella pertussis (RapRVP): NotDetec   Bordetella parapertussis (RapRVP): NotDetec   Chlamydia pneumoniae (RapRVP): NotDetec   Mycoplasma pneumoniae (RapRVP): NotDetec   **** Entero/Rhinovirus (RapRVP): Detected ****  HKU1 Coronavirus (RapRVP): NotDetec   NL63 Coronavirus (RapRVP): NotDetec   229E Coronavirus (RapRVP): NotDetec   OC43 Coronavirus (RapRVP): NotDetec   hMPV (RapRVP): NotDetec     < from: Xray Chest 1 View- PORTABLE-Urgent (Xray Chest 1 View- PORTABLE-Urgent .) (11.15.21 @ 22:23) >      ******PRELIMINARY REPORT******    ******PRELIMINARY REPORT******            EXAM:  XR CHEST PORTABLE URGENT 1V        PROCEDURE DATE:  Nov 15 2021     ******PRELIMINARY REPORT******    ******PRELIMINARY REPORT******            INTERPRETATION:  CLINICAL INDICATION: Pregnant with cough, chills, and aches. History of asthma.    EXAM: Frontal radiograph of the chest.    COMPARISON: Chest radiograph from 3/7/2021.    FINDINGS:  The lungs are clear.  There is no pleural effusion or pneumothorax.  Theheart size is enlarged.  The visualized osseous structures demonstrate no acute pathology.    IMPRESSION:  Clear lungs.      ******PRELIMINARY REPORT******    ******PRELIMINARY REPORT******          EVA AVILA MD; Resident Radiology    < end of copied text >      32yo  @35w (EDIE 21) p/w c/o fevers, chills and known +COVID sick contacts in family. Workup notable for RVP +COVID, Entero/Rhinovirus. O2 sat <95% at rest, improves to 100% on 2L. Pt admitted for +COVID requiring O2. Maternal/fetal status stable overnight.    #COVID  - Cont pO2  - Maintain O2 saturation >95% in the setting of pregnancy  - Reviewed rec for Remdesivir; pt declining at this time  - Reviewed rec for Dexamethasone 2/2 O2 sat requirement  []Consult ID in AM    #cHTN  - Monitor BP, O2, I&O  - On Labetalol 200 BID  - MFM consult re: antiHTN therapy 2/2 asthma and new COVID, Rhino/Enterovirus      #Fetal wellbeing  - Cont FHR  - BPP 8/8 on admission    #Maternal wellbeing  - Reg diet. SLIV.  - VTE ppx: HSQ, SCDs, OOB     seen w/Dr. Savi REES

## 2021-11-16 NOTE — PROGRESS NOTE ADULT - ASSESSMENT
A/P: 32yo  @ 35w1d with history of cHTN and asthma, who presented with COVID requiring supplemental O2. Discussed with patient recommendations described below:    #COVID   - Reviewed recommendation for remdesivir and decadron in order to improve outcome and prevent further respiratory symptoms, patient now amenable   - SQH, SCDs     #cHTN   - Recommend changing from labetalol to procardia given hx of asthma and continued respiratory illenss     #Fetal Wellbeing   - BID NST     Carly Hirschberg, MFM Fellow  Patient seen and examind with OCHOA Sotelo Attendign

## 2021-11-16 NOTE — OB PROVIDER H&P - ASSESSMENT
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 AP course gestational htn admitted 21 s/p 2 doses beta, hx of 3 previous pregnancies with PEC. reports fever of 100.3 today, + chills, + body aches, +n/v on the way here, + coughing dry cough feels she is wheezing more and using her inhaler q 4 hrs compared to her norm of once a day, not eating or drinking today as per patient. denies dysuria ha new swelling vision changes or cp. last saw OB Monday. pt wit + sick contacts her  and kids have been sick-not tested for covid 19, and her father in law is in the hospital with Covid 19.    GBS: negative from 21  meds: PNV Albuterol Labetalol 200 mg 2 x day (was on procardia til last week and felt weak), zinc magnesium  All: denies  PMH: asthma this pregnancy only graves disease herniated discs  PSH: abdominoplasty and hernia 2020, d&c 2014  gyn hx: denies  ob hx:  2007 PEC   2010 7#   2017 PEC   2019 PEC s/p magnesium  TOP d&c     d/w Dr Hou and agrees with plan of:  RVP panel for cough fever chills aches + sick contacts- kids  and father in law( in hospital for covid 19)  Tylenol for Temp 38 rectal  Labetalol 200mg as prescribed pt due for her evening dose  HELLP labs for labile BPs   pulse ox  CXR for hx of asthma cough wheezing  Proventil inhaler ordered for asthma  oral hydration- pt not eating and drinking vomited on way here    d/w Dr Huo admit for observation + covid 19, to monitor pulse ox and 02 @ 35 wks  continuous pulse ox monitoring  oxygen therapy  continuous NST

## 2021-11-16 NOTE — OB PROVIDER H&P - NSHPLABSRESULTS_GEN_ALL_CORE
10.2   5.54  )-----------( 198      ( 15 Nov 2021 20:54 )             32.1   11-15    137  |  105  |  4<L>  ----------------------------<  120<H>  3.4<L>   |  21<L>  |  0.45<L>    Ca    9.0      15 Nov 2021 20:54    TPro  6.4  /  Alb  3.4  /  TBili  <0.2  /  DBili  x   /  AST  17  /  ALT  11  /  AlkPhos  96  11-15  PT/INR - ( 15 Nov 2021 20:54 )   PT: 12.6 sec;   INR: 1.10 ratio         PTT - ( 15 Nov 2021 20:54 )  PTT:31.6 sec  Urinalysis Basic - ( 15 Nov 2021 20:54 )    Color: Yellow / Appearance: Slightly Turbid / S.019 / pH: x  Gluc: x / Ketone: Small  / Bili: Negative / Urobili: <2 mg/dL   Blood: x / Protein: 30 mg/dL / Nitrite: Negative   Leuk Esterase: Negative / RBC: 3 /HPF / WBC 5 /HPF   Sq Epi: x / Non Sq Epi: 9 /HPF / Bacteria: Few    pcr 0.1  SARS-CoV-2: Detected (15 Nov 2021 21:03)

## 2021-11-16 NOTE — OB PROVIDER H&P - HISTORY OF PRESENT ILLNESS
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 AP course gestational htn admitted 21 s/p 2 doses beta, hx of 3 previous pregnancies with PEC. reports fever of 100.3 today, + chills, + body aches, +n/v on the way here, + coughing dry cough feels she is wheezing more and using her inhaler q 4 hrs compared to her norm of once a day, not eating or drinking today as per patient. denies dysuria ha new swelling vision changes or cp. last saw OB Monday. pt wit + sick contacts her  and kids have been sick-not tested for covid 19, and her father in law is in the hospital with Covid 19.    GBS: negative from 21  meds: PNV Albuterol Labetalol 200 mg 2 x day (was on procardia til last week and felt weak), zinc magnesium  All: denies  PMH: asthma this pregnancy only graves disease herniated discs  PSH: abdominoplasty and hernia 2020, d&c 2014  gyn hx: denies  ob hx:  2007 PEC   2010 7#   2017 PEC   2019 PEC s/p magnesium  TOP d&c

## 2021-11-16 NOTE — OB PROVIDER H&P - NSHPPHYSICALEXAM_GEN_ALL_CORE
LS bilaterally lower base wheezing, RLL diminished compared to LLL  abd soft gravid NT, scar lower transverse from abdominoplasty 2020  CV RRR  SVE: 0.5/30/-3  TAS:  vertex  anterior placenta  BPP 8/8  EDGAR: 8.96  FHT:moderate variability, + accels, negative decels  toco: none  Vital Signs Last 24 Hrs  T(C): 38.0 (15 Nov 2021 21:11), Max: 38.0 (15 Nov 2021 21:11)  T(F): 100.4 (15 Nov 2021 21:11), Max: 100.4 (15 Nov 2021 21:11)  HR: 103 (15 Nov 2021 22:43) (99 - 130)  BP: 129/77 (15 Nov 2021 22:37) (118/70 - 149/87)  BP(mean): --  RR: 16 (15 Nov 2021 19:41) (16 - 16)  SpO2: 94% (15 Nov 2021 22:17) (92% - 97%)

## 2021-11-17 DIAGNOSIS — Z04.9 ENCOUNTER FOR EXAMINATION AND OBSERVATION FOR UNSPECIFIED REASON: ICD-10-CM

## 2021-11-17 LAB
ALBUMIN SERPL ELPH-MCNC: 3.4 G/DL — SIGNIFICANT CHANGE UP (ref 3.3–5)
ALP SERPL-CCNC: 98 U/L — SIGNIFICANT CHANGE UP (ref 40–120)
ALT FLD-CCNC: 10 U/L — SIGNIFICANT CHANGE UP (ref 4–33)
AST SERPL-CCNC: 18 U/L — SIGNIFICANT CHANGE UP (ref 4–32)
BILIRUB DIRECT SERPL-MCNC: <0.2 MG/DL — SIGNIFICANT CHANGE UP (ref 0–0.2)
BILIRUB INDIRECT FLD-MCNC: SIGNIFICANT CHANGE UP MG/DL (ref 0–1)
BILIRUB SERPL-MCNC: <0.2 MG/DL — SIGNIFICANT CHANGE UP (ref 0.2–1.2)
COVID-19 NUCLEOCAPSID GAM AB INTERP: NEGATIVE — SIGNIFICANT CHANGE UP
COVID-19 NUCLEOCAPSID TOTAL GAM ANTIBODY RESULT: 0.07 INDEX — SIGNIFICANT CHANGE UP
CREAT SERPL-MCNC: 0.48 MG/DL — LOW (ref 0.5–1.3)
INR BLD: 1.11 RATIO — SIGNIFICANT CHANGE UP (ref 0.88–1.16)
PROT SERPL-MCNC: 6.1 G/DL — SIGNIFICANT CHANGE UP (ref 6–8.3)
PROTHROM AB SERPL-ACNC: 12.7 SEC — SIGNIFICANT CHANGE UP (ref 10.6–13.6)
SARS-COV-2 IGG+IGM SERPL QL IA: 0.07 INDEX — SIGNIFICANT CHANGE UP
SARS-COV-2 IGG+IGM SERPL QL IA: NEGATIVE — SIGNIFICANT CHANGE UP

## 2021-11-17 RX ORDER — BENZOCAINE AND MENTHOL 5; 1 G/100ML; G/100ML
1 LIQUID ORAL EVERY 8 HOURS
Refills: 0 | Status: DISCONTINUED | OUTPATIENT
Start: 2021-11-17 | End: 2021-11-19

## 2021-11-17 RX ADMIN — ALBUTEROL 1 PUFF(S): 90 AEROSOL, METERED ORAL at 20:56

## 2021-11-17 RX ADMIN — HEPARIN SODIUM 5000 UNIT(S): 5000 INJECTION INTRAVENOUS; SUBCUTANEOUS at 05:57

## 2021-11-17 RX ADMIN — Medication 1 TABLET(S): at 10:51

## 2021-11-17 RX ADMIN — Medication 200 MILLIGRAM(S): at 10:48

## 2021-11-17 RX ADMIN — Medication 6 MILLIGRAM(S): at 12:28

## 2021-11-17 RX ADMIN — Medication 200 MILLIGRAM(S): at 21:46

## 2021-11-17 RX ADMIN — BENZOCAINE AND MENTHOL 1 LOZENGE: 5; 1 LIQUID ORAL at 04:18

## 2021-11-17 RX ADMIN — ALBUTEROL 1 PUFF(S): 90 AEROSOL, METERED ORAL at 09:34

## 2021-11-17 RX ADMIN — BENZOCAINE AND MENTHOL 1 LOZENGE: 5; 1 LIQUID ORAL at 20:54

## 2021-11-17 RX ADMIN — REMDESIVIR 500 MILLIGRAM(S): 5 INJECTION INTRAVENOUS at 10:37

## 2021-11-17 NOTE — PROGRESS NOTE ADULT - ASSESSMENT
32yo  @35w2d (EDIE 21) p/w c/o fevers, chills and known +COVID sick contacts in family. Workup notable for RVP +COVID, Entero/Rhinovirus. O2 sat initially <95% at rest, improves to 100% on 2L. Pt admitted for +COVID requiring O2. Maternal/fetal status stable overnight.  Patient not requiring O2 supplementation at this time.    #COVID  - Cont pO2  - Maintain O2 saturation >95% in the setting of pregnancy  - Remdesivir (11/15-)  - Dexamethasone (11/15-)    #cHTN  - Monitor BP, O2, I&O  - On Labetalol 200 BID, recommended transition to Procardia however patient refuses    #Fetal wellbeing  - NST BID  - BPP  on admission    #Maternal wellbeing  - Reg diet. SLIV.  - VTE ppx: HSQ, SCDs, OOB     Adrian Tripathi, PGY3

## 2021-11-18 ENCOUNTER — TRANSCRIPTION ENCOUNTER (OUTPATIENT)
Age: 31
End: 2021-11-18

## 2021-11-18 LAB
ALBUMIN SERPL ELPH-MCNC: 3.3 G/DL — SIGNIFICANT CHANGE UP (ref 3.3–5)
ALP SERPL-CCNC: 96 U/L — SIGNIFICANT CHANGE UP (ref 40–120)
ALT FLD-CCNC: 14 U/L — SIGNIFICANT CHANGE UP (ref 4–33)
AST SERPL-CCNC: 23 U/L — SIGNIFICANT CHANGE UP (ref 4–32)
BILIRUB DIRECT SERPL-MCNC: <0.2 MG/DL — SIGNIFICANT CHANGE UP (ref 0–0.2)
BILIRUB INDIRECT FLD-MCNC: SIGNIFICANT CHANGE UP MG/DL (ref 0–1)
BILIRUB SERPL-MCNC: <0.2 MG/DL — SIGNIFICANT CHANGE UP (ref 0.2–1.2)
CREAT SERPL-MCNC: 0.42 MG/DL — LOW (ref 0.5–1.3)
INR BLD: 1.13 RATIO — SIGNIFICANT CHANGE UP (ref 0.88–1.16)
PROT SERPL-MCNC: 6.1 G/DL — SIGNIFICANT CHANGE UP (ref 6–8.3)
PROTHROM AB SERPL-ACNC: 12.8 SEC — SIGNIFICANT CHANGE UP (ref 10.6–13.6)

## 2021-11-18 RX ADMIN — BENZOCAINE AND MENTHOL 1 LOZENGE: 5; 1 LIQUID ORAL at 00:15

## 2021-11-18 RX ADMIN — Medication 200 MILLIGRAM(S): at 10:15

## 2021-11-18 RX ADMIN — Medication 200 MILLIGRAM(S): at 21:53

## 2021-11-18 RX ADMIN — HEPARIN SODIUM 5000 UNIT(S): 5000 INJECTION INTRAVENOUS; SUBCUTANEOUS at 17:35

## 2021-11-18 RX ADMIN — Medication 1 TABLET(S): at 17:34

## 2021-11-18 RX ADMIN — REMDESIVIR 500 MILLIGRAM(S): 5 INJECTION INTRAVENOUS at 10:15

## 2021-11-18 RX ADMIN — Medication 975 MILLIGRAM(S): at 18:43

## 2021-11-18 RX ADMIN — Medication 975 MILLIGRAM(S): at 17:34

## 2021-11-18 RX ADMIN — ALBUTEROL 1 PUFF(S): 90 AEROSOL, METERED ORAL at 03:38

## 2021-11-18 RX ADMIN — HEPARIN SODIUM 5000 UNIT(S): 5000 INJECTION INTRAVENOUS; SUBCUTANEOUS at 05:46

## 2021-11-18 NOTE — DISCHARGE NOTE ANTEPARTUM - HOSPITAL COURSE
32yo  @35w3d (EDIE 21) p/w c/o fevers,   chills and known +COVID sick contacts in family.   Workup notable for RVP +COVID, Entero/Rhinovirus.   O2 sat initially <95% at rest, improves to 100% on 2L.  Pt admitted for +COVID requiring O2. Maternal/fetal  status stable overnight.  Patient not requiring O2   supplementation at this time.    #COVID  - Cont pO2  - Maintain O2 saturation >95% in the setting of pregnancy  - Remdesivir (-)  - Dexamethasone (-)    #cHTN  - Monitor BP, O2, I&O  - On Labetalol 200 BID, recommended transition to Procardia however patient refuses    #Fetal wellbeing  - NST BID  - BPP  on admission     32yo  @35w3d (EDIE 21) p/w c/o fevers,   chills and known +COVID sick contacts in family.   Workup notable for RVP +COVID, Entero/Rhinovirus.   O2 sat initially <95% at rest, improves to 100% on 2L.  Pt admitted for +COVID requiring O2. Maternal/fetal  status stable overnight.  Patient not requiring O2   supplementation at this time.    #COVID  - Maintain O2 saturation >95% in the setting of pregnancy  - Remdesivir (-)  - Dexamethasone (-)    #cHTN  - Monitor BP, O2, I&O  - On Labetalol 200 BID, recommended transition to Procardia however patient refuses

## 2021-11-18 NOTE — DISCHARGE NOTE ANTEPARTUM - CARE PROVIDER_API CALL
Mary Ellen Palma)  Obstetrics and Gynecology  [facility] [place] [address]  Phone: [phone]  Fax: [phone]  Follow Up Time:

## 2021-11-18 NOTE — DISCHARGE NOTE ANTEPARTUM - MEDICATION SUMMARY - MEDICATIONS TO TAKE
I will START or STAY ON the medications listed below when I get home from the hospital:    labetalol 200 mg oral tablet  -- 1 tab(s) by mouth 2 times a day  -- Indication: For HTN    albuterol  -- Indication: For Asthma    Prena1 oral capsule  -- 1 cap(s) by mouth once a day  -- Indication: For Prenatal

## 2021-11-18 NOTE — DISCHARGE NOTE ANTEPARTUM - PLAN OF CARE
Admitted with symptomatic COVID:  -Notify OBGYN for decreased fetal movement, no fetal movement, leaking of fluid, vaginal bleeding, cramping, contractions.  -Call your doctor if you start to feel worse: increased congestion, chest pain, coughing or fevers  -Seek emergency medical treatment if you have difficulty breathing

## 2021-11-18 NOTE — PROGRESS NOTE ADULT - ASSESSMENT
32yo  @35w3d (EDIE 21) p/w c/o fevers, chills and known +COVID sick contacts in family. Workup notable for RVP +COVID, Entero/Rhinovirus. O2 sat initially <95% at rest, improves to 100% on 2L. Pt admitted for +COVID requiring O2. Maternal/fetal status stable overnight.  Patient not requiring O2 supplementation at this time.    #COVID  - Cont pO2  - Maintain O2 saturation >95% in the setting of pregnancy  - Remdesivir (11/15-)  - Dexamethasone (11/15-)    #cHTN  - Monitor BP, O2, I&O  - On Labetalol 200 BID, recommended transition to Procardia however patient refuses    #Fetal wellbeing  - NST BID  - BPP  on admission    #Maternal wellbeing  - Reg diet. SLIV.  - VTE ppx: HSQ, SCDs, OOB     Adrian Tripathi, PGY3 30yo  @35w3d (EDIE 21) p/w c/o fevers, chills and known +COVID sick contacts in family. Workup notable for RVP +COVID, Entero/Rhinovirus. O2 sat initially <95% at rest, improves to 100% on 2L. Pt admitted for +COVID requiring O2. Maternal/fetal status stable overnight.  Patient not requiring O2 supplementation at this time.    #COVID  - Cont pO2  - Maintain O2 saturation >95% in the setting of pregnancy  - Remdesivir (-)  - Dexamethasone (-)    #cHTN  - Monitor BP, O2, I&O  - On Labetalol 200 BID, recommended transition to Procardia however patient refuses    #Fetal wellbeing  - NST BID  - BPP  on admission    #Maternal wellbeing  - Reg diet. SLIV.  - VTE ppx: HSQ, SCDs, OOB     Adrian Tripathi, PGY3

## 2021-11-18 NOTE — DISCHARGE NOTE ANTEPARTUM - CARE PLAN
1 Principal Discharge DX:	2019 novel coronavirus disease (COVID-19)  Assessment and plan of treatment:	Admitted with symptomatic COVID:  -Notify OBGYN for decreased fetal movement, no fetal movement, leaking of fluid, vaginal bleeding, cramping, contractions.  -Call your doctor if you start to feel worse: increased congestion, chest pain, coughing or fevers  -Seek emergency medical treatment if you have difficulty breathing

## 2021-11-18 NOTE — DISCHARGE NOTE ANTEPARTUM - PATIENT PORTAL LINK FT
You can access the FollowMyHealth Patient Portal offered by [facility] by registering at the following website: [email or web address]. By joining [employer]’s FollowMyHealth portal, you will also be able to view your health information using other applications (apps) compatible with our system.

## 2021-11-19 ENCOUNTER — APPOINTMENT (OUTPATIENT)
Dept: OBGYN | Facility: CLINIC | Age: 31
End: 2021-11-19

## 2021-11-19 ENCOUNTER — APPOINTMENT (OUTPATIENT)
Dept: UROLOGY | Facility: CLINIC | Age: 31
End: 2021-11-19

## 2021-11-19 VITALS — SYSTOLIC BLOOD PRESSURE: 112 MMHG | DIASTOLIC BLOOD PRESSURE: 60 MMHG | HEART RATE: 83 BPM

## 2021-11-19 LAB
ALBUMIN SERPL ELPH-MCNC: 3.5 G/DL — SIGNIFICANT CHANGE UP (ref 3.3–5)
ALP SERPL-CCNC: 103 U/L — SIGNIFICANT CHANGE UP (ref 40–120)
ALT FLD-CCNC: 21 U/L — SIGNIFICANT CHANGE UP (ref 4–33)
AST SERPL-CCNC: 33 U/L — HIGH (ref 4–32)
BILIRUB DIRECT SERPL-MCNC: <0.2 MG/DL — SIGNIFICANT CHANGE UP (ref 0–0.2)
BILIRUB INDIRECT FLD-MCNC: SIGNIFICANT CHANGE UP MG/DL (ref 0–1)
BILIRUB SERPL-MCNC: <0.2 MG/DL — SIGNIFICANT CHANGE UP (ref 0.2–1.2)
BLD GP AB SCN SERPL QL: NEGATIVE — SIGNIFICANT CHANGE UP
CREAT SERPL-MCNC: 0.45 MG/DL — LOW (ref 0.5–1.3)
INR BLD: 1.16 RATIO — SIGNIFICANT CHANGE UP (ref 0.88–1.16)
PROT SERPL-MCNC: 6.6 G/DL — SIGNIFICANT CHANGE UP (ref 6–8.3)
PROTHROM AB SERPL-ACNC: 13.3 SEC — SIGNIFICANT CHANGE UP (ref 10.6–13.6)
RH IG SCN BLD-IMP: POSITIVE — SIGNIFICANT CHANGE UP

## 2021-11-19 PROCEDURE — 99232 SBSQ HOSP IP/OBS MODERATE 35: CPT | Mod: GC

## 2021-11-19 RX ADMIN — Medication 975 MILLIGRAM(S): at 06:23

## 2021-11-19 RX ADMIN — Medication 975 MILLIGRAM(S): at 06:56

## 2021-11-19 RX ADMIN — HEPARIN SODIUM 5000 UNIT(S): 5000 INJECTION INTRAVENOUS; SUBCUTANEOUS at 06:11

## 2021-11-19 RX ADMIN — REMDESIVIR 500 MILLIGRAM(S): 5 INJECTION INTRAVENOUS at 10:24

## 2021-11-19 RX ADMIN — Medication 200 MILLIGRAM(S): at 10:24

## 2021-11-19 NOTE — PROGRESS NOTE ADULT - SUBJECTIVE AND OBJECTIVE BOX
MFM Consult Note     32yo  @ 35w1d with history of cHTN and asthma, who presented with fevers, chills and known +COVID sick contacts in family. Workup notable for RVP +COVID, Entero/Rhinovirus and O2 sat <95% at rest, which improved to 100% on 2L. Pt admitted for +COVID requiring O2. Patient reports that when she is sitting up straight she does not need oxygen, but when laying or at rest she does need it. She denies headache, vision changes, or abdominal pain. Patient denies chest pain at this time.     Vital Signs Last 24 Hrs  T(C): 37.3 (2021 08:27), Max: 38.0 (15 Nov 2021 21:11)  T(F): 99.14 (2021 08:27), Max: 100.4 (15 Nov 2021 21:11)  HR: 100 (2021 10:57) (77 - 130)  BP: 122/74 (2021 10:44) (100/59 - 151/78)  BP(mean): --  RR: 18 (2021 05:42) (16 - 18)  SpO2: 99% (2021 10:22) (90% - 100%)    Gen: NAD, resting comfortably   Pulm: non labored breathing, able to complete full sentences without difficulty   CV: RRR   Abd: soft, gravid     EFM: category 1   Ethel: no contractions 
R3 Antepartum Note, HD#3    Interval events:    Patient seen and examined at bedside, no acute overnight events. No acute complaints. Patient reports vaginal soreness with steroids.    Pt reports +FM, denies LOF, VB, ctx, HA, epigastric pain, blurred vision, CP, SOB, N/V, fevers, and chills.    Vital Signs Last 24 Hours  T(C): 36.5 (11-18-21 @ 06:11), Max: 36.6 (11-17-21 @ 17:55)  HR: 78 (11-18-21 @ 06:11) (78 - 93)  BP: 103/53 (11-18-21 @ 06:11) (103/53 - 127/78)  RR: 18 (11-18-21 @ 06:11) (18 - 18)  SpO2: 98% (11-18-21 @ 06:11) (97% - 99%)    CAPILLARY BLOOD GLUCOSE          Physical Exam:  General: NAD  Abdomen: Soft, non-tender, gravid  Ext: No pain or swelling    NST reactive overnight    Labs:  11-18 @ 06:54    x   |  x   |  x   ----------------------------<  x   x    |  x   |  0.42      TPro  6.1  /  Alb  3.3  /  TBili  <0.2  /  DBili  <0.2  /  AST  23  /  ALT  14  /  AlkPhos  96  11-18 @ 06:54    PT/INR - ( 11-18 @ 06:54 )   PT: 12.8 sec;   INR: 1.13 ratio    PTT - ( 11-15 @ 20:54 )  PTT:31.6 sec    Uric Acid: (11-15 @ 20:54)  4.8      Fibrinogen: (11-15 @ 20:54)  563      LDH: (11-15 @ 20:54)  174        MEDICATIONS  (STANDING):  ALBUTerol    90 MICROgram(s) HFA Inhaler 1 Puff(s) Inhalation every 6 hours  dexAMETHasone  Injectable 6 milliGRAM(s) IV Push daily  heparin   Injectable 5000 Unit(s) SubCutaneous every 12 hours  labetalol 200 milliGRAM(s) Oral every 12 hours  prenatal multivitamin 1 Tablet(s) Oral daily  remdesivir  IVPB   IV Intermittent   remdesivir  IVPB 100 milliGRAM(s) IV Intermittent every 24 hours    MEDICATIONS  (PRN):  acetaminophen     Tablet .. 975 milliGRAM(s) Oral every 6 hours PRN Mild Pain (1 - 3), Moderate Pain (4 - 6), Severe Pain (7 - 10)  ALBUTerol    90 MICROgram(s) HFA Inhaler 1 Puff(s) Inhalation every 6 hours PRN Shortness of Breath and/or Wheezing  benzocaine 15 mG/menthol 3.6 mG Lozenge 1 Lozenge Oral every 8 hours PRN Sore Throat  
R3 Antepartum Note, HD#2    Interval events:    Patient seen and examined at bedside, no acute overnight events. No acute complaints. Pt reports +FM, denies LOF, VB, ctx, HA, epigastric pain, blurred vision, CP, SOB, N/V, fevers, and chills.    Vital Signs Last 24 Hours  T(C): 36.6 (11-17-21 @ 05:27), Max: 37.3 (11-16-21 @ 08:27)  HR: 85 (11-17-21 @ 05:27) (76 - 127)  BP: 115/55 (11-17-21 @ 05:27) (106/53 - 140/86)  RR: 18 (11-17-21 @ 05:27) (17 - 18)  SpO2: 100% (11-17-21 @ 05:27) (90% - 100%)    CAPILLARY BLOOD GLUCOSE          Physical Exam:  General: NAD  Abdomen: Soft, non-tender, gravid  Ext: No pain or swelling    NST reactive overnight    Labs:             10.2   5.54  )-----------( 198      ( 11-15 @ 20:54 )             32.1     11-16 @ 05:28    x   |  x   |  x   ----------------------------<  x   x    |  x   |  0.43    Ca    9.0      11-15 @ 20:54    TPro  6.3  /  Alb  3.3  /  TBili  <0.2  /  DBili  <0.2  /  AST  16  /  ALT  8   /  AlkPhos  91  11-16 @ 05:28    PT/INR - ( 11-16 @ 05:28 )   PT: 12.6 sec;   INR: 1.11 ratio    PTT - ( 11-15 @ 20:54 )  PTT:31.6 sec    Uric Acid: (11-15 @ 20:54)  4.8      Fibrinogen: (11-15 @ 20:54)  563      LDH: (11-15 @ 20:54)  174        MEDICATIONS  (STANDING):  ALBUTerol    90 MICROgram(s) HFA Inhaler 1 Puff(s) Inhalation every 6 hours  benzocaine 15 mG/menthol 3.6 mG (Sugar-Free) Lozenge 1 Lozenge Oral five times a day  dexAMETHasone  Injectable 6 milliGRAM(s) IV Push daily  heparin   Injectable 5000 Unit(s) SubCutaneous every 12 hours  labetalol 200 milliGRAM(s) Oral every 12 hours  prenatal multivitamin 1 Tablet(s) Oral daily  remdesivir  IVPB   IV Intermittent   remdesivir  IVPB 100 milliGRAM(s) IV Intermittent every 24 hours    MEDICATIONS  (PRN):  acetaminophen     Tablet .. 975 milliGRAM(s) Oral every 6 hours PRN Mild Pain (1 - 3), Moderate Pain (4 - 6), Severe Pain (7 - 10)  ALBUTerol    90 MICROgram(s) HFA Inhaler 1 Puff(s) Inhalation every 6 hours PRN Shortness of Breath and/or Wheezing  
R3 Antepartum Note, HD#4    Interval events:    Patient seen and examined at bedside, no acute overnight events. Mild congestion overnight. Pt reports +FM, denies LOF, VB, ctx, HA, epigastric pain, blurred vision, CP, SOB, N/V, fevers, and chills.    Vital Signs Last 24 Hours  T(C): 36.5 (11-19-21 @ 06:16), Max: 36.6 (11-18-21 @ 13:39)  HR: 84 (11-19-21 @ 06:16) (73 - 96)  BP: 127/81 (11-19-21 @ 06:16) (127/81 - 139/79)  RR: 18 (11-19-21 @ 06:16) (17 - 19)  SpO2: 98% (11-19-21 @ 06:16) (97% - 100%)    CAPILLARY BLOOD GLUCOSE          Physical Exam:  General: NAD  Abdomen: Soft, non-tender, gravid  Ext: No pain or swelling    NST reactive overnight    Labs:  11-18 @ 06:54    x   |  x   |  x   ----------------------------<  x   x    |  x   |  0.42      TPro  6.1  /  Alb  3.3  /  TBili  <0.2  /  DBili  <0.2  /  AST  23  /  ALT  14  /  AlkPhos  96  11-18 @ 06:54    PT/INR - ( 11-18 @ 06:54 )   PT: 12.8 sec;   INR: 1.13 ratio          MEDICATIONS  (STANDING):  ALBUTerol    90 MICROgram(s) HFA Inhaler 1 Puff(s) Inhalation every 6 hours  dexAMETHasone  Injectable 6 milliGRAM(s) IV Push daily  heparin   Injectable 5000 Unit(s) SubCutaneous every 12 hours  labetalol 200 milliGRAM(s) Oral every 12 hours  prenatal multivitamin 1 Tablet(s) Oral daily  remdesivir  IVPB   IV Intermittent   remdesivir  IVPB 100 milliGRAM(s) IV Intermittent every 24 hours    MEDICATIONS  (PRN):  acetaminophen     Tablet .. 975 milliGRAM(s) Oral every 6 hours PRN Mild Pain (1 - 3), Moderate Pain (4 - 6), Severe Pain (7 - 10)  ALBUTerol    90 MICROgram(s) HFA Inhaler 1 Puff(s) Inhalation every 6 hours PRN Shortness of Breath and/or Wheezing  benzocaine 15 mG/menthol 3.6 mG Lozenge 1 Lozenge Oral every 8 hours PRN Sore Throat

## 2021-11-19 NOTE — PROGRESS NOTE ADULT - ATTENDING COMMENTS
Patient seen and examined  Has no respiratory complains and has normal oxygen saturation.  She got her 4th day of remdisvir   FHR tracing shows moderate variability  Will discharge her home today and follow up as out patient

## 2021-11-19 NOTE — PROGRESS NOTE ADULT - ASSESSMENT
30yo  @35w4d (EDIE 21) p/w c/o fevers, chills and known +COVID sick contacts in family. Workup notable for RVP +COVID, Entero/Rhinovirus. O2 sat initially <95% at rest, improves to 100% on 2L. Pt admitted for +COVID requiring O2. Maternal/fetal status stable overnight.  Patient not requiring O2 supplementation at this time.    #COVID  - Cont pO2  - Maintain O2 saturation >95% in the setting of pregnancy  - Remdesivir (-)  - s/p Dexamethasone (-) patient refuses due to "vaginal soreness"    #cHTN  - Monitor BP, O2, I&O  - On Labetalol 200 BID, recommended transition to Procardia however patient refuses    #Fetal wellbeing  - NST BID  - BPP 8 on admission    #Maternal wellbeing  - Reg diet. SLIV.  - VTE ppx: HSQ, SCDs, OOB     Adrian Tripathi, PGY3

## 2021-11-20 ENCOUNTER — TRANSCRIPTION ENCOUNTER (OUTPATIENT)
Age: 31
End: 2021-11-20

## 2021-11-20 ENCOUNTER — OUTPATIENT (OUTPATIENT)
Dept: INPATIENT UNIT | Facility: HOSPITAL | Age: 31
LOS: 1 days | Discharge: HOME | End: 2021-11-20

## 2021-11-20 VITALS
DIASTOLIC BLOOD PRESSURE: 60 MMHG | HEART RATE: 83 BPM | RESPIRATION RATE: 18 BRPM | SYSTOLIC BLOOD PRESSURE: 112 MMHG | TEMPERATURE: 98 F

## 2021-11-20 VITALS
OXYGEN SATURATION: 100 % | HEART RATE: 83 BPM | TEMPERATURE: 99 F | DIASTOLIC BLOOD PRESSURE: 75 MMHG | SYSTOLIC BLOOD PRESSURE: 122 MMHG

## 2021-11-20 DIAGNOSIS — Z98.890 OTHER SPECIFIED POSTPROCEDURAL STATES: Chronic | ICD-10-CM

## 2021-11-20 DIAGNOSIS — Z98.89 OTHER SPECIFIED POSTPROCEDURAL STATES: Chronic | ICD-10-CM

## 2021-11-20 DIAGNOSIS — O26.899 OTHER SPECIFIED PREGNANCY RELATED CONDITIONS, UNSPECIFIED TRIMESTER: ICD-10-CM

## 2021-11-20 DIAGNOSIS — Z3A.00 WEEKS OF GESTATION OF PREGNANCY NOT SPECIFIED: ICD-10-CM

## 2021-11-20 PROBLEM — J45.909 UNSPECIFIED ASTHMA, UNCOMPLICATED: Chronic | Status: ACTIVE | Noted: 2021-11-15

## 2021-11-20 LAB
ALBUMIN SERPL ELPH-MCNC: 3.6 G/DL — SIGNIFICANT CHANGE UP (ref 3.3–5)
ALP SERPL-CCNC: 122 U/L — HIGH (ref 40–120)
ALT FLD-CCNC: 19 U/L — SIGNIFICANT CHANGE UP (ref 4–33)
ANION GAP SERPL CALC-SCNC: 11 MMOL/L — SIGNIFICANT CHANGE UP (ref 7–14)
APPEARANCE UR: CLEAR — SIGNIFICANT CHANGE UP
APTT BLD: 38.2 SEC — HIGH (ref 27–36.3)
AST SERPL-CCNC: 20 U/L — SIGNIFICANT CHANGE UP (ref 4–32)
BACTERIA # UR AUTO: ABNORMAL
BASOPHILS # BLD AUTO: 0.06 K/UL — SIGNIFICANT CHANGE UP (ref 0–0.2)
BASOPHILS NFR BLD AUTO: 0.9 % — SIGNIFICANT CHANGE UP (ref 0–2)
BILIRUB SERPL-MCNC: 0.2 MG/DL — SIGNIFICANT CHANGE UP (ref 0.2–1.2)
BILIRUB UR-MCNC: NEGATIVE — SIGNIFICANT CHANGE UP
BUN SERPL-MCNC: 8 MG/DL — SIGNIFICANT CHANGE UP (ref 7–23)
CALCIUM SERPL-MCNC: 9.3 MG/DL — SIGNIFICANT CHANGE UP (ref 8.4–10.5)
CHLORIDE SERPL-SCNC: 101 MMOL/L — SIGNIFICANT CHANGE UP (ref 98–107)
CO2 SERPL-SCNC: 24 MMOL/L — SIGNIFICANT CHANGE UP (ref 22–31)
COLOR SPEC: SIGNIFICANT CHANGE UP
CREAT ?TM UR-MCNC: 29 MG/DL — SIGNIFICANT CHANGE UP
CREAT SERPL-MCNC: 0.54 MG/DL — SIGNIFICANT CHANGE UP (ref 0.5–1.3)
DIFF PNL FLD: ABNORMAL
EOSINOPHIL # BLD AUTO: 0.25 K/UL — SIGNIFICANT CHANGE UP (ref 0–0.5)
EOSINOPHIL NFR BLD AUTO: 3.7 % — SIGNIFICANT CHANGE UP (ref 0–6)
EPI CELLS # UR: SIGNIFICANT CHANGE UP
FIBRINOGEN PPP-MCNC: 549 MG/DL — HIGH (ref 290–520)
GIANT PLATELETS BLD QL SMEAR: PRESENT — SIGNIFICANT CHANGE UP
GLUCOSE SERPL-MCNC: 116 MG/DL — HIGH (ref 70–99)
GLUCOSE UR QL: NEGATIVE — SIGNIFICANT CHANGE UP
HCT VFR BLD CALC: 37.9 % — SIGNIFICANT CHANGE UP (ref 34.5–45)
HGB BLD-MCNC: 12.4 G/DL — SIGNIFICANT CHANGE UP (ref 11.5–15.5)
IANC: 2.65 K/UL — SIGNIFICANT CHANGE UP (ref 1.5–8.5)
INR BLD: 1.13 RATIO — SIGNIFICANT CHANGE UP (ref 0.88–1.16)
KETONES UR-MCNC: NEGATIVE — SIGNIFICANT CHANGE UP
LDH SERPL L TO P-CCNC: 210 U/L — SIGNIFICANT CHANGE UP (ref 135–225)
LEUKOCYTE ESTERASE UR-ACNC: NEGATIVE — SIGNIFICANT CHANGE UP
LYMPHOCYTES # BLD AUTO: 2.29 K/UL — SIGNIFICANT CHANGE UP (ref 1–3.3)
LYMPHOCYTES # BLD AUTO: 34 % — SIGNIFICANT CHANGE UP (ref 13–44)
MANUAL SMEAR VERIFICATION: SIGNIFICANT CHANGE UP
MCHC RBC-ENTMCNC: 28.9 PG — SIGNIFICANT CHANGE UP (ref 27–34)
MCHC RBC-ENTMCNC: 32.7 GM/DL — SIGNIFICANT CHANGE UP (ref 32–36)
MCV RBC AUTO: 88.3 FL — SIGNIFICANT CHANGE UP (ref 80–100)
MONOCYTES # BLD AUTO: 0.43 K/UL — SIGNIFICANT CHANGE UP (ref 0–0.9)
MONOCYTES NFR BLD AUTO: 6.4 % — SIGNIFICANT CHANGE UP (ref 2–14)
MYELOCYTES NFR BLD: 3.7 % — HIGH (ref 0–0)
NEUTROPHILS # BLD AUTO: 2.96 K/UL — SIGNIFICANT CHANGE UP (ref 1.8–7.4)
NEUTROPHILS NFR BLD AUTO: 44 % — SIGNIFICANT CHANGE UP (ref 43–77)
NITRITE UR-MCNC: NEGATIVE — SIGNIFICANT CHANGE UP
PH UR: 7 — SIGNIFICANT CHANGE UP (ref 5–8)
PLAT MORPH BLD: NORMAL — SIGNIFICANT CHANGE UP
PLATELET # BLD AUTO: 283 K/UL — SIGNIFICANT CHANGE UP (ref 150–400)
PLATELET COUNT - ESTIMATE: NORMAL — SIGNIFICANT CHANGE UP
POLYCHROMASIA BLD QL SMEAR: SLIGHT — SIGNIFICANT CHANGE UP
POTASSIUM SERPL-MCNC: 4.7 MMOL/L — SIGNIFICANT CHANGE UP (ref 3.5–5.3)
POTASSIUM SERPL-SCNC: 4.7 MMOL/L — SIGNIFICANT CHANGE UP (ref 3.5–5.3)
PROT ?TM UR-MCNC: 6 MG/DL — SIGNIFICANT CHANGE UP
PROT ?TM UR-MCNC: 6 MG/DL — SIGNIFICANT CHANGE UP
PROT SERPL-MCNC: 6.6 G/DL — SIGNIFICANT CHANGE UP (ref 6–8.3)
PROT UR-MCNC: NEGATIVE — SIGNIFICANT CHANGE UP
PROT/CREAT UR-RTO: 0.2 RATIO — SIGNIFICANT CHANGE UP (ref 0–0.2)
PROTHROM AB SERPL-ACNC: 12.8 SEC — SIGNIFICANT CHANGE UP (ref 10.6–13.6)
RBC # BLD: 4.29 M/UL — SIGNIFICANT CHANGE UP (ref 3.8–5.2)
RBC # FLD: 14.9 % — HIGH (ref 10.3–14.5)
RBC BLD AUTO: NORMAL — SIGNIFICANT CHANGE UP
RBC CASTS # UR COMP ASSIST: 0 /HPF — SIGNIFICANT CHANGE UP (ref 0–4)
SMUDGE CELLS # BLD: PRESENT — SIGNIFICANT CHANGE UP
SODIUM SERPL-SCNC: 136 MMOL/L — SIGNIFICANT CHANGE UP (ref 135–145)
SP GR SPEC: 1.01 — SIGNIFICANT CHANGE UP (ref 1–1.05)
URATE SERPL-MCNC: 4.4 MG/DL — SIGNIFICANT CHANGE UP (ref 2.5–7)
UROBILINOGEN FLD QL: SIGNIFICANT CHANGE UP
VARIANT LYMPHS # BLD: 7.3 % — HIGH (ref 0–6)
WBC # BLD: 6.73 K/UL — SIGNIFICANT CHANGE UP (ref 3.8–10.5)
WBC # FLD AUTO: 6.73 K/UL — SIGNIFICANT CHANGE UP (ref 3.8–10.5)
WBC UR QL: SIGNIFICANT CHANGE UP /HPF (ref 0–5)

## 2021-11-20 RX ORDER — CITRIC ACID/SODIUM CITRATE 300-500 MG
30 SOLUTION, ORAL ORAL ONCE
Refills: 0 | Status: COMPLETED | OUTPATIENT
Start: 2021-11-20 | End: 2021-11-20

## 2021-11-20 RX ORDER — ACETAMINOPHEN 500 MG
975 TABLET ORAL ONCE
Refills: 0 | Status: COMPLETED | OUTPATIENT
Start: 2021-11-20 | End: 2021-11-20

## 2021-11-20 RX ADMIN — Medication 975 MILLIGRAM(S): at 18:21

## 2021-11-20 RX ADMIN — Medication 30 MILLILITER(S): at 18:24

## 2021-11-20 NOTE — OB PROVIDER TRIAGE NOTE - NS_OBGYNHISTORY_OBGYN_ALL_OB_FT
2007, , at 37 weeks, induction of labor, preeclampsia., 7lbs  10/27/2010, , full term, 7lbs  2017, , full term, 10 lbs  2019,  at 37 weeks, induction of labor, preeclampsia, 7 lbs  2015, SAB, 1st trimester, complete  , TOP 1st trimester, with D&C

## 2021-11-20 NOTE — OB PROVIDER TRIAGE NOTE - HISTORY OF PRESENT ILLNESS
Patient presented with c/o decreased fetal movements since this morning, c/o headache, epigastric pain and vomiting since this morning.

## 2021-11-20 NOTE — OB PROVIDER TRIAGE NOTE - NSOBPROVIDERNOTE_OBGYN_ALL_OB_FT
30 yo , EGA@35.5 weeks, presented c/o decreased fetal movements since this morning, c/o headache, epigastric pain and vomiting since this morning.  Patient is known COVID+, was discharged on 2021, s/p Remdesevir, Dexamethasone; fully vaccinated.   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     Patient denies contractions,  denies leaking of fluid, denies vaginal bleeding.     Prenatal care with Dr. Palma.  Prenatal course is significant for  chronic HTN.  Patient admitted twice;  - exacerbation of HTN, Procardia XL 30 mg was added, BP normalized  - symptomatic COVID, s/p Remdesevir and Dexamethasone       OB hx: 2007, , at 37 weeks, induction of labor, preeclampsia., 7lbs           10/27/2010, , full term, 7lbs           2017, , full term, 10 lbs           2019,  at 37 weeks, induction of labor, preeclampsia, 7 lbs           , SAB, 1st trimester, complete           , TOP 1st trimester, with D&C  Med hx: chronic HTN, on Labetalol, stopped Procardia due to side effects              Grave's disease, no meds             asthma, mild, Albuterol, pren, last time used 2 days ago            herniated disc           denies hx clotting or bleeding disorders, DM  Surg hx: , abdominoplasty, hernia repair              , D&C  GYN hx: denies hx abnormal Papsmear, STIs, fibroids, polyps, cysts  Family hx: no hx congential disorders, bleeding/clotting disorders  Psych hx: denies   Social hx: denies ETOH, smoking, drugs. Safe at home/in relationship. Contraception.   Meds: PNV qd; labetalol 200 mg BID; Albuterol, prn; self stopped ASA at 26 weeks  No Known Allergies    – Will accept blood transfusions? Yes    T(C): 36.8 (21 @ 16:12), Max: 36.8 (21 @ 16:12)  HR: 101 (21 @ 16:51) (83 - 101)  BP: 137/95 (21 @ 16:50) (112/60 - 137/95)  RR: 18 (11-20-21 @ 16:12) (18 - 18)  SpO2: --    – PE:   CV: RRR  Pulm: breathing comfortably on RA  Abd: gravid, nontender  Extr: moving all extremities with ease    – FHT: baseline 150, mod variability, +accels, -decels  – [place]: occasional  – bedside sonogram: vertex, anterior placenta, EFW 2774 g, AAFV, BPP 8/8,   Patient reports + fetal movements at this time.     A: 30 yo, , EGA@35.5 weeks, with decreased fetal movements (resolved); known COVID+, s/p Redemsevir and Dexamethasone; chronic HTN, r/o superimposed preeclampsia     P: HELLP labs sent; serial BPs; case was reviewed with Dr Valles.    Flory Sauceda CNM 30 yo , EGA@35.5 weeks, presented c/o decreased fetal movements since this morning, c/o headache, epigastric pain and vomiting since this morning.  Patient is known COVID+, was discharged on 2021, s/p Remdesevir, Dexamethasone; fully vaccinated.   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 Diagnosed on 11/15/2021.    Patient denies contractions,  denies leaking of fluid, denies vaginal bleeding.     Prenatal care with Dr. Palma.  Prenatal course is significant for  chronic HTN.  Patient admitted twice;  - exacerbation of HTN, Procardia XL 30 mg was added, BP normalized  - symptomatic COVID, s/p Remdesevir and Dexamethasone (11/)       OB hx: 2007, , at 37 weeks, induction of labor, preeclampsia., 7lbs           10/27/2010, , full term, 7lbs           2017, , full term, 10 lbs           2019,  at 37 weeks, induction of labor, preeclampsia, 7 lbs           , SAB, 1st trimester, complete           , TOP 1st trimester, with D&C  Med hx: chronic HTN, on Labetalol, stopped Procardia due to side effects              Grave's disease, no meds             asthma, mild, Albuterol, pren, last time used 2 days ago            herniated disc           denies hx clotting or bleeding disorders, DM  Surg hx: , abdominoplasty, hernia repair              , D&C  GYN hx: denies hx abnormal Papsmear, STIs, fibroids, polyps, cysts  Family hx: no hx congential disorders, bleeding/clotting disorders  Psych hx: denies   Social hx: denies ETOH, smoking, drugs. Safe at home/in relationship. Contraception.   Meds: PNV qd; labetalol 200 mg BID; Albuterol, prn; self stopped ASA at 26 weeks  No Known Allergies    – Will accept blood transfusions? Yes    T(C): 36.8 (21 @ 16:12), Max: 36.8 (21 @ 16:12)  HR: 101 (21 @ 16:51) (83 - 101)  BP: 137/95 (21 @ 16:50) (112/60 - 137/95)  RR: 18 (21 @ 16:12) (18 - 18)  SpO2: --    – PE:   CV: RRR  Pulm: breathing comfortably on RA  Abd: gravid, nontender  Extr: moving all extremities with ease    – FHT: baseline 150, mod variability, +accels, -decels  – [place]: occasional  – bedside sonogram: vertex, anterior placenta, EFW 2774 g, AAFV, BPP ,   Patient reports + fetal movements at this time.     A: 30 yo, , EGA@35.5 weeks, with decreased fetal movements (resolved); known COVID+, s/p Redemsevir and Dexamethasone; chronic HTN, r/o superimposed preeclampsia     P: HELLP labs sent; serial BPs; case was reviewed with Dr Valles.    Flory Sauceda CNM 32 yo , EGA@35.5 weeks, presented c/o decreased fetal movements since this morning, c/o headache, epigastric pain and vomiting since this morning.  Patient is known COVID+, was discharged on 2021, s/p Remdesevir, Dexamethasone; fully vaccinated.   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 Diagnosed on 11/15/2021.    Patient denies contractions,  denies leaking of fluid, denies vaginal bleeding.     Prenatal care with Dr. Palma.  Prenatal course is significant for  chronic HTN.  Patient admitted twice;  - exacerbation of HTN, Procardia XL 30 mg was added, BP normalized  - symptomatic COVID, s/p Remdesevir and Dexamethasone (11/)       OB hx: 2007, , at 37 weeks, induction of labor, preeclampsia., 7lbs           10/27/2010, , full term, 7lbs           2017, , full term, 10 lbs           2019,  at 37 weeks, induction of labor, preeclampsia, 7 lbs           , SAB, 1st trimester, complete           , TOP 1st trimester, with D&C  Med hx: chronic HTN, on Labetalol, stopped Procardia due to side effects              Grave's disease, no meds             asthma, mild, Albuterol, pren, last time used 2 days ago            herniated disc           denies hx clotting or bleeding disorders, DM  Surg hx: , abdominoplasty, hernia repair              , D&C  GYN hx: denies hx abnormal Papsmear, STIs, fibroids, polyps, cysts  Family hx: no hx congential disorders, bleeding/clotting disorders  Psych hx: denies   Social hx: denies ETOH, smoking, drugs. Safe at home/in relationship. Contraception.   Meds: PNV qd; labetalol 200 mg BID; Albuterol, prn; self stopped ASA at 26 weeks  No Known Allergies    – Will accept blood transfusions? Yes    T(C): 36.8 (21 @ 16:12), Max: 36.8 (21 @ 16:12)  HR: 101 (21 @ 16:51) (83 - 101)  BP: 137/95 (21 @ 16:50) (112/60 - 137/95)  RR: 18 (21 @ 16:12) (18 - 18)  SpO2: --    – PE:   CV: RRR  Pulm: breathing comfortably on RA  Abd: gravid, nontender  Extr: moving all extremities with ease    – FHT: baseline 150, mod variability, +accels, -decels  – [place]: occasional  – bedside sonogram: vertex, anterior placenta, EFW 2774 g, AAFV, BPP ,   Patient reports + fetal movements at this time.     A: 32 yo, , EGA@35.5 weeks, with decreased fetal movements (resolved); known COVID+, s/p Redemsevir and Dexamethasone; chronic HTN, r/o superimposed preeclampsia     P: HELLP labs sent; serial BPs; case was reviewed with Dr Valles.    Flory Sauceda CNM    addendum at 6:10pm: Patient feels +fetal movements; reports feeling better but still mild headache and epigastric pain.  BP range: BP: 135/91 (2021 17:43) (112/60 - 137/95)  HELLP labs:                        12.4   6.73  )-----------( 283      ( 2021 17:26 )             37.9       136  |  101  |  8   ----------------------------<  116<H>  4.7   |  24  |  0.54    Ca    9.3      2021 17:26    TPro  6.6  /  Alb  3.6  /  TBili  0.2  /  DBili  x   /  AST  20  /  ALT  19  /  AlkPhos  122<H>  11-20  urine p/c ratio 0.2  case was reviewed with Dr Valles, was advised to administer Tylenol and Bicitra and reassess in half an hour.    Flory Sauceda CNM 30 yo , EGA@35.5 weeks, presented c/o decreased fetal movements since this morning, c/o headache, epigastric pain and vomiting since this morning.  Patient is known COVID+, was discharged on 2021, s/p Remdesevir, Dexamethasone; fully vaccinated.   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 Diagnosed on 11/15/2021.    Patient denies contractions,  denies leaking of fluid, denies vaginal bleeding.     Prenatal care with Dr. Palma.  Prenatal course is significant for  chronic HTN.  Patient admitted twice;  - exacerbation of HTN, Procardia XL 30 mg was added, BP normalized  - symptomatic COVID, s/p Remdesevir and Dexamethasone (11/)       OB hx: 2007, , at 37 weeks, induction of labor, preeclampsia., 7lbs           10/27/2010, , full term, 7lbs           2017, , full term, 10 lbs           2019,  at 37 weeks, induction of labor, preeclampsia, 7 lbs           , SAB, 1st trimester, complete           , TOP 1st trimester, with D&C  Med hx: chronic HTN, on Labetalol, stopped Procardia due to side effects              Grave's disease, no meds             asthma, mild, Albuterol, pren, last time used 2 days ago            herniated disc           denies hx clotting or bleeding disorders, DM  Surg hx: , abdominoplasty, hernia repair              , D&C  GYN hx: denies hx abnormal Papsmear, STIs, fibroids, polyps, cysts  Family hx: no hx congential disorders, bleeding/clotting disorders  Psych hx: denies   Social hx: denies ETOH, smoking, drugs. Safe at home/in relationship. Contraception.   Meds: PNV qd; labetalol 200 mg BID; Albuterol, prn; self stopped ASA at 26 weeks  No Known Allergies    – Will accept blood transfusions? Yes    T(C): 36.8 (21 @ 16:12), Max: 36.8 (21 @ 16:12)  HR: 101 (21 @ 16:51) (83 - 101)  BP: 137/95 (21 @ 16:50) (112/60 - 137/95)  RR: 18 (21 @ 16:12) (18 - 18)  SpO2: --    – PE:   CV: RRR  Pulm: breathing comfortably on RA  Abd: gravid, nontender  Extr: moving all extremities with ease    – FHT: baseline 150, mod variability, +accels, -decels  – [place]: occasional  – bedside sonogram: vertex, anterior placenta, EFW 2774 g, AAFV, BPP ,   Patient reports + fetal movements at this time.     A: 30 yo, , EGA@35.5 weeks, with decreased fetal movements (resolved); known COVID+, s/p Redemsevir and Dexamethasone; chronic HTN, r/o superimposed preeclampsia     P: HELLP labs sent; serial BPs; case was reviewed with Dr Valles.    Flory Sauceda CNM    addendum at 6:10pm: Patient feels +fetal movements; reports feeling better but still mild headache and epigastric pain.  BP range: BP: 135/91 (2021 17:43) (112/60 - 137/95)  HELLP labs:                        12.4   6.73  )-----------( 283      ( 2021 17:26 )             37.9       136  |  101  |  8   ----------------------------<  116<H>  4.7   |  24  |  0.54    Ca    9.3      2021 17:26    TPro  6.6  /  Alb  3.6  /  TBili  0.2  /  DBili  x   /  AST  20  /  ALT  19  /  AlkPhos  122<H>  11-20  urine p/c ratio 0.2  case was reviewed with Dr Valles, was advised to administer Tylenol and Bicitra and reassess in half an hour.    Flory Sauceda CNM    addendum at 19:15: patient stated feeling better after Tylenol and Bicitra, wants to go home; tolerates food and fluid; case reviewed with Dr Valles, was advised to discharge patient home with instructions to follow up with Dr Palma in 2 days; BP monitoring at home, 3 times a day; labor precautions and fetal movements count reviewed.

## 2021-11-20 NOTE — OB PROVIDER TRIAGE NOTE - ADDITIONAL INSTRUCTIONS
follow up with Dr Palma in 2 days; BP monitoring at home, 3 times a day; labor precautions and fetal movements count reviewed.

## 2021-11-20 NOTE — OB RN TRIAGE NOTE - NS_FETALMOVEMENT_OBGYN_ALL_OB
Problem: Patient/Family Goals  Goal: Patient/Family Long Term Goal  Description  Patient's Long Term Goal: To go home    Interventions:  - Admission for sepsis  - Cultures pending  - Consult to Pulmonary and Surgery  - IVF, Abx  - See additional Care Jeronimo Initiate interventions, skin care algorithm/standards of care as needed  Outcome: Progressing  Goal: Incision(s), wounds(s) or drain site(s) healing without S/S of infection  Description  INTERVENTIONS:  - Assess and document risk factors for pressure ulce Absent or reduced

## 2021-11-22 ENCOUNTER — TRANSCRIPTION ENCOUNTER (OUTPATIENT)
Age: 31
End: 2021-11-22

## 2021-11-22 ENCOUNTER — NON-APPOINTMENT (OUTPATIENT)
Age: 31
End: 2021-11-22

## 2021-11-22 ENCOUNTER — APPOINTMENT (OUTPATIENT)
Dept: OBGYN | Facility: CLINIC | Age: 31
End: 2021-11-22

## 2021-11-22 ENCOUNTER — APPOINTMENT (OUTPATIENT)
Dept: UROLOGY | Facility: CLINIC | Age: 31
End: 2021-11-22

## 2021-11-23 ENCOUNTER — NON-APPOINTMENT (OUTPATIENT)
Age: 31
End: 2021-11-23

## 2021-11-23 ENCOUNTER — APPOINTMENT (OUTPATIENT)
Dept: OBGYN | Facility: CLINIC | Age: 31
End: 2021-11-23

## 2021-11-29 ENCOUNTER — INPATIENT (INPATIENT)
Facility: HOSPITAL | Age: 31
LOS: 4 days | Discharge: HOME | End: 2021-12-04
Attending: OBSTETRICS & GYNECOLOGY | Admitting: OBSTETRICS & GYNECOLOGY
Payer: COMMERCIAL

## 2021-11-29 DIAGNOSIS — O16.9 UNSPECIFIED MATERNAL HYPERTENSION, UNSPECIFIED TRIMESTER: ICD-10-CM

## 2021-11-29 DIAGNOSIS — Z98.890 OTHER SPECIFIED POSTPROCEDURAL STATES: Chronic | ICD-10-CM

## 2021-11-29 DIAGNOSIS — Z98.89 OTHER SPECIFIED POSTPROCEDURAL STATES: Chronic | ICD-10-CM

## 2021-11-30 ENCOUNTER — TRANSCRIPTION ENCOUNTER (OUTPATIENT)
Age: 31
End: 2021-11-30

## 2021-11-30 ENCOUNTER — APPOINTMENT (OUTPATIENT)
Dept: ANTEPARTUM | Facility: CLINIC | Age: 31
End: 2021-11-30

## 2021-11-30 VITALS — HEART RATE: 90 BPM

## 2021-11-30 LAB
ALBUMIN SERPL ELPH-MCNC: 3.3 G/DL — SIGNIFICANT CHANGE UP (ref 3.3–5)
ALBUMIN SERPL ELPH-MCNC: 3.8 G/DL — SIGNIFICANT CHANGE UP (ref 3.3–5)
ALP SERPL-CCNC: 126 U/L — HIGH (ref 40–120)
ALP SERPL-CCNC: 138 U/L — HIGH (ref 40–120)
ALT FLD-CCNC: 12 U/L — SIGNIFICANT CHANGE UP (ref 4–33)
ALT FLD-CCNC: 13 U/L — SIGNIFICANT CHANGE UP (ref 4–33)
ANION GAP SERPL CALC-SCNC: 11 MMOL/L — SIGNIFICANT CHANGE UP (ref 7–14)
ANION GAP SERPL CALC-SCNC: 14 MMOL/L — SIGNIFICANT CHANGE UP (ref 7–14)
APPEARANCE UR: ABNORMAL
APTT BLD: 28 SEC — SIGNIFICANT CHANGE UP (ref 27–36.3)
APTT BLD: 28.1 SEC — SIGNIFICANT CHANGE UP (ref 27–36.3)
AST SERPL-CCNC: 13 U/L — SIGNIFICANT CHANGE UP (ref 4–32)
AST SERPL-CCNC: 13 U/L — SIGNIFICANT CHANGE UP (ref 4–32)
BASOPHILS # BLD AUTO: 0.04 K/UL — SIGNIFICANT CHANGE UP (ref 0–0.2)
BASOPHILS # BLD AUTO: 0.19 K/UL — SIGNIFICANT CHANGE UP (ref 0–0.2)
BASOPHILS NFR BLD AUTO: 0.5 % — SIGNIFICANT CHANGE UP (ref 0–2)
BASOPHILS NFR BLD AUTO: 2.8 % — HIGH (ref 0–2)
BILIRUB SERPL-MCNC: 0.2 MG/DL — SIGNIFICANT CHANGE UP (ref 0.2–1.2)
BILIRUB SERPL-MCNC: <0.2 MG/DL — SIGNIFICANT CHANGE UP (ref 0.2–1.2)
BILIRUB UR-MCNC: NEGATIVE — SIGNIFICANT CHANGE UP
BLD GP AB SCN SERPL QL: NEGATIVE — SIGNIFICANT CHANGE UP
BUN SERPL-MCNC: 11 MG/DL — SIGNIFICANT CHANGE UP (ref 7–23)
BUN SERPL-MCNC: 7 MG/DL — SIGNIFICANT CHANGE UP (ref 7–23)
CALCIUM SERPL-MCNC: 10.5 MG/DL — SIGNIFICANT CHANGE UP (ref 8.4–10.5)
CALCIUM SERPL-MCNC: 9.1 MG/DL — SIGNIFICANT CHANGE UP (ref 8.4–10.5)
CHLORIDE SERPL-SCNC: 101 MMOL/L — SIGNIFICANT CHANGE UP (ref 98–107)
CHLORIDE SERPL-SCNC: 104 MMOL/L — SIGNIFICANT CHANGE UP (ref 98–107)
CO2 SERPL-SCNC: 22 MMOL/L — SIGNIFICANT CHANGE UP (ref 22–31)
CO2 SERPL-SCNC: 23 MMOL/L — SIGNIFICANT CHANGE UP (ref 22–31)
COLOR SPEC: YELLOW — SIGNIFICANT CHANGE UP
COVID-19 SPIKE DOMAIN AB INTERP: POSITIVE
COVID-19 SPIKE DOMAIN ANTIBODY RESULT: 5.36 U/ML — HIGH
CREAT ?TM UR-MCNC: 149 MG/DL — SIGNIFICANT CHANGE UP
CREAT SERPL-MCNC: 0.46 MG/DL — LOW (ref 0.5–1.3)
CREAT SERPL-MCNC: 0.47 MG/DL — LOW (ref 0.5–1.3)
DIFF PNL FLD: ABNORMAL
EOSINOPHIL # BLD AUTO: 0.14 K/UL — SIGNIFICANT CHANGE UP (ref 0–0.5)
EOSINOPHIL # BLD AUTO: 0.19 K/UL — SIGNIFICANT CHANGE UP (ref 0–0.5)
EOSINOPHIL NFR BLD AUTO: 1.7 % — SIGNIFICANT CHANGE UP (ref 0–6)
EOSINOPHIL NFR BLD AUTO: 2.8 % — SIGNIFICANT CHANGE UP (ref 0–6)
FIBRINOGEN PPP-MCNC: 615 MG/DL — HIGH (ref 290–520)
FIBRINOGEN PPP-MCNC: 658 MG/DL — HIGH (ref 290–520)
GLUCOSE SERPL-MCNC: 92 MG/DL — SIGNIFICANT CHANGE UP (ref 70–99)
GLUCOSE SERPL-MCNC: 99 MG/DL — SIGNIFICANT CHANGE UP (ref 70–99)
GLUCOSE UR QL: NEGATIVE — SIGNIFICANT CHANGE UP
HCT VFR BLD CALC: 34.8 % — SIGNIFICANT CHANGE UP (ref 34.5–45)
HCT VFR BLD CALC: 34.8 % — SIGNIFICANT CHANGE UP (ref 34.5–45)
HGB BLD-MCNC: 11 G/DL — LOW (ref 11.5–15.5)
HGB BLD-MCNC: 11.2 G/DL — LOW (ref 11.5–15.5)
IANC: 3.01 K/UL — SIGNIFICANT CHANGE UP (ref 1.5–8.5)
IANC: 5.52 K/UL — SIGNIFICANT CHANGE UP (ref 1.5–8.5)
IMM GRANULOCYTES NFR BLD AUTO: 1.1 % — SIGNIFICANT CHANGE UP (ref 0–1.5)
INR BLD: 1.03 RATIO — SIGNIFICANT CHANGE UP (ref 0.88–1.16)
INR BLD: 1.04 RATIO — SIGNIFICANT CHANGE UP (ref 0.88–1.16)
KETONES UR-MCNC: NEGATIVE — SIGNIFICANT CHANGE UP
LDH SERPL L TO P-CCNC: 166 U/L — SIGNIFICANT CHANGE UP (ref 135–225)
LDH SERPL L TO P-CCNC: 182 U/L — SIGNIFICANT CHANGE UP (ref 135–225)
LEUKOCYTE ESTERASE UR-ACNC: ABNORMAL
LYMPHOCYTES # BLD AUTO: 1.8 K/UL — SIGNIFICANT CHANGE UP (ref 1–3.3)
LYMPHOCYTES # BLD AUTO: 2.18 K/UL — SIGNIFICANT CHANGE UP (ref 1–3.3)
LYMPHOCYTES # BLD AUTO: 21.2 % — SIGNIFICANT CHANGE UP (ref 13–44)
LYMPHOCYTES # BLD AUTO: 31.8 % — SIGNIFICANT CHANGE UP (ref 13–44)
MCHC RBC-ENTMCNC: 28.1 PG — SIGNIFICANT CHANGE UP (ref 27–34)
MCHC RBC-ENTMCNC: 28.3 PG — SIGNIFICANT CHANGE UP (ref 27–34)
MCHC RBC-ENTMCNC: 31.6 GM/DL — LOW (ref 32–36)
MCHC RBC-ENTMCNC: 32.2 GM/DL — SIGNIFICANT CHANGE UP (ref 32–36)
MCV RBC AUTO: 87.2 FL — SIGNIFICANT CHANGE UP (ref 80–100)
MCV RBC AUTO: 89.5 FL — SIGNIFICANT CHANGE UP (ref 80–100)
MONOCYTES # BLD AUTO: 0.44 K/UL — SIGNIFICANT CHANGE UP (ref 0–0.9)
MONOCYTES # BLD AUTO: 0.89 K/UL — SIGNIFICANT CHANGE UP (ref 0–0.9)
MONOCYTES NFR BLD AUTO: 10.5 % — SIGNIFICANT CHANGE UP (ref 2–14)
MONOCYTES NFR BLD AUTO: 6.5 % — SIGNIFICANT CHANGE UP (ref 2–14)
NEUTROPHILS # BLD AUTO: 3.19 K/UL — SIGNIFICANT CHANGE UP (ref 1.8–7.4)
NEUTROPHILS # BLD AUTO: 5.52 K/UL — SIGNIFICANT CHANGE UP (ref 1.8–7.4)
NEUTROPHILS NFR BLD AUTO: 46.7 % — SIGNIFICANT CHANGE UP (ref 43–77)
NEUTROPHILS NFR BLD AUTO: 65 % — SIGNIFICANT CHANGE UP (ref 43–77)
NITRITE UR-MCNC: NEGATIVE — SIGNIFICANT CHANGE UP
NRBC # BLD: 0 /100 WBCS — SIGNIFICANT CHANGE UP
NRBC # FLD: 0 K/UL — SIGNIFICANT CHANGE UP
PH UR: 7 — SIGNIFICANT CHANGE UP (ref 5–8)
PLATELET # BLD AUTO: 198 K/UL — SIGNIFICANT CHANGE UP (ref 150–400)
PLATELET # BLD AUTO: 228 K/UL — SIGNIFICANT CHANGE UP (ref 150–400)
POTASSIUM SERPL-MCNC: 3.7 MMOL/L — SIGNIFICANT CHANGE UP (ref 3.5–5.3)
POTASSIUM SERPL-MCNC: 4.1 MMOL/L — SIGNIFICANT CHANGE UP (ref 3.5–5.3)
POTASSIUM SERPL-SCNC: 3.7 MMOL/L — SIGNIFICANT CHANGE UP (ref 3.5–5.3)
POTASSIUM SERPL-SCNC: 4.1 MMOL/L — SIGNIFICANT CHANGE UP (ref 3.5–5.3)
PROT ?TM UR-MCNC: 16 MG/DL — SIGNIFICANT CHANGE UP
PROT ?TM UR-MCNC: 16 MG/DL — SIGNIFICANT CHANGE UP
PROT SERPL-MCNC: 6.6 G/DL — SIGNIFICANT CHANGE UP (ref 6–8.3)
PROT SERPL-MCNC: 7.1 G/DL — SIGNIFICANT CHANGE UP (ref 6–8.3)
PROT UR-MCNC: ABNORMAL
PROT/CREAT UR-RTO: 0.1 RATIO — SIGNIFICANT CHANGE UP (ref 0–0.2)
PROTHROM AB SERPL-ACNC: 11.8 SEC — SIGNIFICANT CHANGE UP (ref 10.6–13.6)
PROTHROM AB SERPL-ACNC: 11.9 SEC — SIGNIFICANT CHANGE UP (ref 10.6–13.6)
RBC # BLD: 3.89 M/UL — SIGNIFICANT CHANGE UP (ref 3.8–5.2)
RBC # BLD: 3.99 M/UL — SIGNIFICANT CHANGE UP (ref 3.8–5.2)
RBC # FLD: 14.3 % — SIGNIFICANT CHANGE UP (ref 10.3–14.5)
RBC # FLD: 14.5 % — SIGNIFICANT CHANGE UP (ref 10.3–14.5)
RH IG SCN BLD-IMP: POSITIVE — SIGNIFICANT CHANGE UP
SARS-COV-2 IGG+IGM SERPL QL IA: 5.36 U/ML — HIGH
SARS-COV-2 IGG+IGM SERPL QL IA: POSITIVE
SODIUM SERPL-SCNC: 137 MMOL/L — SIGNIFICANT CHANGE UP (ref 135–145)
SODIUM SERPL-SCNC: 138 MMOL/L — SIGNIFICANT CHANGE UP (ref 135–145)
SP GR SPEC: 1.03 — SIGNIFICANT CHANGE UP (ref 1–1.05)
T PALLIDUM AB TITR SER: NEGATIVE — SIGNIFICANT CHANGE UP
URATE SERPL-MCNC: 5.3 MG/DL — SIGNIFICANT CHANGE UP (ref 2.5–7)
URATE SERPL-MCNC: 5.5 MG/DL — SIGNIFICANT CHANGE UP (ref 2.5–7)
UROBILINOGEN FLD QL: SIGNIFICANT CHANGE UP
WBC # BLD: 6.84 K/UL — SIGNIFICANT CHANGE UP (ref 3.8–10.5)
WBC # BLD: 8.48 K/UL — SIGNIFICANT CHANGE UP (ref 3.8–10.5)
WBC # FLD AUTO: 6.84 K/UL — SIGNIFICANT CHANGE UP (ref 3.8–10.5)
WBC # FLD AUTO: 8.48 K/UL — SIGNIFICANT CHANGE UP (ref 3.8–10.5)

## 2021-11-30 PROCEDURE — 99222 1ST HOSP IP/OBS MODERATE 55: CPT | Mod: GC

## 2021-11-30 RX ORDER — SODIUM CHLORIDE 9 MG/ML
1000 INJECTION, SOLUTION INTRAVENOUS
Refills: 0 | Status: DISCONTINUED | OUTPATIENT
Start: 2021-11-30 | End: 2021-12-01

## 2021-11-30 RX ORDER — ALBUTEROL 90 UG/1
2 AEROSOL, METERED ORAL EVERY 6 HOURS
Refills: 0 | Status: DISCONTINUED | OUTPATIENT
Start: 2021-11-30 | End: 2021-12-04

## 2021-11-30 RX ORDER — OXYTOCIN 10 UNIT/ML
VIAL (ML) INJECTION
Qty: 30 | Refills: 0 | Status: DISCONTINUED | OUTPATIENT
Start: 2021-11-30 | End: 2021-12-01

## 2021-11-30 RX ORDER — ACETAMINOPHEN 500 MG
975 TABLET ORAL ONCE
Refills: 0 | Status: COMPLETED | OUTPATIENT
Start: 2021-11-30 | End: 2021-11-30

## 2021-11-30 RX ORDER — LABETALOL HCL 100 MG
200 TABLET ORAL
Refills: 0 | Status: DISCONTINUED | OUTPATIENT
Start: 2021-11-30 | End: 2021-12-04

## 2021-11-30 RX ORDER — CITRIC ACID/SODIUM CITRATE 300-500 MG
15 SOLUTION, ORAL ORAL EVERY 6 HOURS
Refills: 0 | Status: DISCONTINUED | OUTPATIENT
Start: 2021-11-30 | End: 2021-12-01

## 2021-11-30 RX ORDER — MORPHINE SULFATE 50 MG/1
4 CAPSULE, EXTENDED RELEASE ORAL ONCE
Refills: 0 | Status: DISCONTINUED | OUTPATIENT
Start: 2021-11-30 | End: 2021-11-30

## 2021-11-30 RX ORDER — HYDRALAZINE HCL 50 MG
5 TABLET ORAL ONCE
Refills: 0 | Status: COMPLETED | OUTPATIENT
Start: 2021-11-30 | End: 2021-11-30

## 2021-11-30 RX ORDER — OXYTOCIN 10 UNIT/ML
333.33 VIAL (ML) INJECTION
Qty: 20 | Refills: 0 | Status: DISCONTINUED | OUTPATIENT
Start: 2021-11-30 | End: 2021-12-04

## 2021-11-30 RX ADMIN — SODIUM CHLORIDE 125 MILLILITER(S): 9 INJECTION, SOLUTION INTRAVENOUS at 02:35

## 2021-11-30 RX ADMIN — Medication 975 MILLIGRAM(S): at 02:34

## 2021-11-30 RX ADMIN — Medication 2 MILLIUNIT(S)/MIN: at 20:11

## 2021-11-30 RX ADMIN — Medication 975 MILLIGRAM(S): at 01:40

## 2021-11-30 RX ADMIN — Medication 975 MILLIGRAM(S): at 19:32

## 2021-11-30 RX ADMIN — Medication 975 MILLIGRAM(S): at 19:45

## 2021-11-30 RX ADMIN — Medication 15 MILLILITER(S): at 01:41

## 2021-11-30 RX ADMIN — Medication 200 MILLIGRAM(S): at 01:40

## 2021-11-30 RX ADMIN — Medication 200 MILLIGRAM(S): at 14:00

## 2021-11-30 NOTE — OB PROVIDER H&P - HISTORY OF PRESENT ILLNESS
R2 H&P    Pt is a 32 y/o  at 37w1d admitted for scheduled  Patient reports occasional contractions. She denies vaginal bleeding, LOF, FM felt.   Prenatal course complicated by cHTN and had symptomatic COVID s/p admission with Rendesivir and Dexamethasone  GBS negative  EFW 3200     OBHx:  - 2007:  7lb c/b PEC  - 10/27/2010:  7lb   - 2017:  10 lb   - 2019:  7lb c/b PEC  - sAB x 1, eTOP x 1 w/ D&C  GynHx: denies any ovarian cysts, fibroids, abnl pap smears0  PMHx: cHTN, asthma never been intubated or hospitalized  PSHx: abdominoplasty , D&C  Med: Labetalol 200 BID, albuterol PRN, Tylenol, PNV  All: NKDA  SH: Denies any alcohol, tobacco, illicit substance use       R2 H&P    Pt is a 32 y/o  at 37w1d admitted for scheduled induction of labor.  Patient reports occasional contractions. She denies vaginal bleeding, LOF, FM felt.   Prenatal course complicated by cHTN and had symptomatic COVID s/p admission with Rendesivir and Dexamethasone -  GBS negative  EFW 3200     OBHx:  - 2007:  7lb c/b PEC  - 10/27/2010:  7lb   - 2017:  10 lb   - 2019:  7lb c/b PEC  - sAB x 1, eTOP x 1 w/ D&C  GynHx: denies any ovarian cysts, fibroids, abnl pap smears  PMHx: cHTN, asthma never been intubated or hospitalized  PSHx: abdominoplasty , D&C  Med: Labetalol 200 BID, albuterol PRN, Tylenol, PNV  All: NKDA  Social: Denies any alcohol, tobacco, illicit substance use  Psych: denies any anxiety or depression

## 2021-11-30 NOTE — OB PROVIDER H&P - ASSESSMENT
A&P:   Labor: admit for scheduled IOL  - induction with po cytotec and cervical balloon  - HELLP + routine labs  -EFM/toco  - CLD   Fetal: cat 1 tracing, fetal status reassuring  GBS: neg      Discussed with Dr. Savi Castillo PGY-2   A&P:   Labor: admit for scheduled IOL due to cHTN. Of note, patient had two severe BPs fifteen minutes apart. Hydralazine 5 was drawn but was not given due to next BP being 148/99. Patient given home dose of Labetalol 200.   - induction with po cytotec and cervical balloon  - HELLP + routine labs  - Consents signed  - EFM/toco  - CLD   Fetal: cat 1 tracing, fetal status reassuring  GBS: neg      Discussed with Dr. Savi Castillo PGY-2

## 2021-11-30 NOTE — OB PROVIDER H&P - NS_OBGYNHISTORY_OBGYN_ALL_OB_FT
OBHx:  - 2007:  7lb c/b PEC  - 10/27/2010:  7lb   - 2017:  10 lb   - 2019:  7lb c/b PEC  - sAB x 1, eTOP x 1 w/ D&C  GynHx: denies any ovarian cysts, fibroids, abnl pap smears

## 2021-11-30 NOTE — CHART NOTE - NSCHARTNOTEFT_GEN_A_CORE
30 y/o  at 37w1d admitted for scheduled induction of labor. Prenatal course complicated by cHTN and had symptomatic COVID requiring recent admission for remdesivir and Dexamethasone -. Hematology called to clear for epidural placement because large platelets seen on differential, manual review of smear. Patient has plt count of 228. She had large platelets seen on  as well, otherwise no large platelets seen on previous CBCs (no other manual smear reviews seen) and no h/o thrombocytopenia. She reports no h/o of bleeding complications after surgery (previously had D+C) and she reports having epidural placement during each of her previous pregnancies without complication or any significant bleeding. Given this history, okay to proceed with epidural placement. 32 y/o  at 37w1d admitted for scheduled induction of labor. Prenatal course complicated by cHTN and had symptomatic COVID requiring recent admission for remdesivir and Dexamethasone -. Hematology called to clear for epidural placement because large platelets seen on differential, manual review of smear. Patient has plt count of 228. She had large platelets seen on  as well, otherwise no large platelets seen on previous CBCs (no other manual smear reviews seen) and no h/o thrombocytopenia. She reports no h/o of bleeding complications after surgery (previously had D+C) and she reports having epidural placement during each of her previous pregnancies without complication or any significant bleeding. incomplete 30 y/o  at 37w1d admitted for scheduled induction of labor. Prenatal course complicated by cHTN and had symptomatic COVID requiring recent admission for remdesivir and Dexamethasone -. Hematology called to clear for epidural placement because large platelets seen on differential, manual review of smear. Patient has plt count of 228. She had large platelets seen on  as well, otherwise no large platelets seen on previous CBCs (no other manual smear reviews seen) and no h/o thrombocytopenia. She reports no h/o of bleeding complications after surgery (previously had D+C) and she reports having epidural placement during each of her previous pregnancies without complication or any significant bleeding. D/w anesthesia and Dr. Barillas, in setting of recent COVID and potential for platelet dysfunction, recommended holding off on epidural placement. Anesthesia planning to use PCA for pain control. Will review smear in AM and recommend sending platelet function studies, although this will take >24 hours to result. Full consult to follow.

## 2021-11-30 NOTE — CONSULT NOTE ADULT - ATTENDING COMMENTS
30 y/o  at 37w1d with an otherwise PMH of asthma and HTN during pregnancy who was admitted for scheduled induction of labor. Noted to have giant platelets on peripheral smear. History notable for recent COVID19 infection for which she received dexamethasone and remdesivir. No history of bleeding with prior procedures. Periheral smear reviewed, RBC normochromic, normocytic, normal platelets with occasional giant platelets. Suspect this is related to recent COVID19 infection. No contraindication to epidural placement from a hematologic standpoint.

## 2021-11-30 NOTE — CONSULT NOTE ADULT - ASSESSMENT
****INCOMPLETE****    32 y/o  at 37w1d with an otherwise PMH asthma and HTN during pregnancy who was admitted for scheduled induction of labor. Hematology was consulted for giant platelets seen on laboratory-reviewed CBC, with question of contraindication to epidural placement.    #Giant platelets  - Seen on CBC performed by laboratory  - Peripheral smear shows __________________  - Giant platelets can be seen as a reaction in acute illness, such as COVID-19 as an incomplete response to illness  - Otherwise, on CBC, WBC, Hb, and platelet counts are normal with some reactive lymphocytes  - Epidural ________________    Negritoes MD David, PGY-4  Hematology/Oncology Fellow  [facility]  Pager: [phone]  After 5PM and on weekends and holidays, please call the inpatient fellow on call. ****INCOMPLETE****    30 y/o  at 37w1d with an otherwise PMH of asthma and HTN during pregnancy who was admitted for scheduled induction of labor. Hematology was consulted for giant platelets seen on laboratory-reviewed CBC, with question of contraindication to epidural placement.    #Giant platelets  - Seen on CBC performed by laboratory  - Peripheral smear shows __________________  - Giant platelets can be seen as a reaction in acute illness, such as COVID-19 as an incomplete response to illness  - Otherwise, on CBC, WBC, Hb, and platelet counts are normal with some reactive lymphocytes  - Epidural ________________    Negritoes MD David, PGY-4  Hematology/Oncology Fellow  [facility]  Pager: [phone]  After 5PM and on weekends and holidays, please call the inpatient fellow on call. ****INCOMPLETE****    30 y/o  at 37w1d with an otherwise PMH of asthma and HTN during pregnancy who was admitted for scheduled induction of labor. Hematology was consulted for giant platelets seen on laboratory-reviewed CBC, with question of contraindication to epidural placement.    #Giant platelets  - Seen on CBC performed by laboratory  - Peripheral smear shows __________________  - Giant platelets can be seen during high turnover, such as a reaction in acute illness, such as COVID-19 as an incomplete response to illness, and can also be seen in pregnancy. Overall not concerning in the setting of a normal platelet count  - Otherwise, on CBC, WBC and Hb are normal with some reactive lymphocytes  - Epidural ________________    Aryles Hedjar, MD, PGY-4  Hematology/Oncology Fellow  [facility]  Pager: [phone]  After 5PM and on weekends and holidays, please call the inpatient fellow on call. ****INCOMPLETE****    32 y/o  at 37w1d with an otherwise PMH of asthma and HTN during pregnancy who was admitted for scheduled induction of labor. Hematology was consulted for giant platelets seen on laboratory-reviewed CBC, with question of contraindication to epidural placement.    #Giant platelets  - Seen on CBC performed by laboratory  - Peripheral smear shows occasional giant platelets and occasional platelet clumping, otherwise no dysplastic WBCs, normal RBCs  - Patient has no personal or family bleeding or clotting history  - Giant platelets can be seen during high turnover, and are very frequently seen in COVID-19 in particular, which patient is recovering from. Overall, having giant platelets should not prevent a procedure in the setting of a normal platelet count and normal coagulation panel  - Therefore, there is NO CONTRAINDICATION to patient having an epidural  - No evidence of Darren-Soulier Syndrome, given patient's lack of bleeding history by this age    Aryles Hedjar, MD, PGY-4  Hematology/Oncology Fellow  [facility]  Pager: [phone]  After 5PM and on weekends and holidays, please call the inpatient fellow on call. 32 y/o  at 37w1d with an otherwise PMH of asthma and HTN during pregnancy who was admitted for scheduled induction of labor. Hematology was consulted for giant platelets seen on laboratory-reviewed CBC, with question of contraindication to epidural placement.    #Giant platelets  - Seen on CBC performed by laboratory  - Peripheral smear shows occasional giant platelets and occasional platelet clumping, otherwise no dysplastic WBCs, normal RBCs  - Patient has no personal or family bleeding or clotting history  - Giant platelets can be seen during high turnover, and are very frequently seen in COVID-19 in particular, which patient is recovering from. Overall, having giant platelets should not prevent a procedure in the setting of a normal platelet count and normal coagulation panel  - Therefore, there is NO CONTRAINDICATION to patient having an epidural  - No evidence of Darren-Soulier Syndrome, given patient's lack of bleeding history by this age    Aryles Hedjar, MD, PGY-4  Hematology/Oncology Fellow  [facility]  Pager: [phone]  After 5PM and on weekends and holidays, please call the inpatient fellow on call.

## 2021-11-30 NOTE — CONSULT NOTE ADULT - SUBJECTIVE AND OBJECTIVE BOX
32 y/o  at 37w1d with an otherwise PMH asthma who was admitted for scheduled induction of labor. Patient feels contraction pains but denies pain anywhere else. She denies CP, SOB, N/V, current headache, diarrhea, rash, or edema.  Her prenatal course was complicated by hypertension (no preeclampsia per patient) and was diagnosed with COVID on 11/15. She was given Dexamethasone from -. Her symptoms including headache without blurry vision, loss of taste, mild SOB, and fatigue. She says all of her symptoms have resolved, including her taste, which she regained about 3 days ago.    Hematology is consulted for clearance for epidural pain medicine use in the setting of giant platelets seen on smear and recent COVID-19.  Patient denies any personal or family history of bleeding or clotting disorders.      Allergies  No Known Allergies        MEDICATIONS  (STANDING):  citric acid/sodium citrate Solution 15 milliLiter(s) Oral every 6 hours  labetalol 200 milliGRAM(s) Oral two times a day  lactated ringers. 1000 milliLiter(s) (125 mL/Hr) IV Continuous <Continuous>  misoprostol Oral Solution 60 MICROGram(s) Oral every 2 hours  oxytocin Infusion 333.333 milliUNIT(s)/Min (1000 mL/Hr) IV Continuous <Continuous>    MEDICATIONS  (PRN):  ALBUTerol    90 MICROgram(s) HFA Inhaler 2 Puff(s) Inhalation every 6 hours PRN Shortness of Breath and/or Wheezing      PAST MEDICAL HISTORY:  Hypertension in pregnancy  Asthma, mild, no intubations    PAST SURGICAL HISTORY:  History of dilation and curettage,   H/O abdominoplasty,       FAMILY HISTORY:  Family history of myocardial infarction (Maternal grandfather  of MI)  FH: stroke (Paternal grandmother)  No family history of cancer        SOCIAL HISTORY: No EtOH or tobacco use    REVIEW OF SYSTEMS:  CONSTITUTIONAL: no fever  EYES/ENT: No visual changes;  no throat pain   NECK: No pain or stiffness  RESPIRATORY: no sob  CARDIOVASCULAR: No chest pain or palpitations  GASTROINTESTINAL: + abdominal/labor pain. No N/V/D/C  GENITOURINARY: + abdominal/labor pain. No dysuria or hematuria  NEUROLOGICAL: No numbness or weakness  SKIN: No itching, burning, rashes, or lesions   Psych: No depression   MSK: no joint pain  Allergy: no urticaria     Height (cm): 160 (11-30 @ 02:15)  Weight (kg): 97.5 ( @ 02:15)  BMI (kg/m2): 38.1 ( 02:15)  BSA (m2): 1.99 ( 02:15)    T(F): 98.06 (21 @ 05:30), Max: 98.8 (21 @ 02:15)  HR: 88 (21 @ 13:54)  BP: 136/82 (21 @ 13:39)  RR: 17 (21 @ 05:30)  SpO2: 98% (21 @ 09:38)  Wt(kg): --    GENERAL: Mild distress from labor pains  HEENT: EOMI, MMM, no oropharyngeal lesions or erythema appreciated  Pulm: no increased WOB, CTAB/L  CV: RRR, S1, S2, no m/g/r  ABDOMEN: + gravid. NT, ND, no masses felt, no HSM  MSK: nl ROM  EXTREMITIES:  no appreciable edema in b/l LE  Neuro: A&Ox3, no focal deficits  SKIN: warm and dry, no visible rash or lesions                          11.0   6.84  )-----------( 228      ( 2021 01:39 )             34.8           137  |  101  |  11  ----------------------------<  92  4.1   |  22  |  0.46<L>    Ca    10.5      2021 01:39    TPro  7.1  /  Alb  3.8  /  TBili  <0.2  /  DBili  x   /  AST  13  /  ALT  13  /  AlkPhos  138<H>        Uric Acid, Serum: 5.3 mg/dL ( @ 01:39)  Lactate Dehydrogenase, Serum: 182 U/L ( @ 01:39)       32 y/o  at 37w1d with an otherwise PMH of asthma who was admitted for scheduled induction of labor. Patient feels contraction pains but denies pain anywhere else. She denies CP, SOB, N/V, current headache, diarrhea, rash, or edema.  Her prenatal course was complicated by hypertension (no preeclampsia per patient) and was diagnosed with COVID on 11/15. She was given Dexamethasone from -. Her symptoms including headache without blurry vision, loss of taste, mild SOB, and fatigue. She says all of her symptoms have resolved, including her taste, which she regained about 3 days ago.    Hematology is consulted for clearance for epidural pain medicine use in the setting of giant platelets seen on smear and recent COVID-19.  Patient denies any personal or family history of bleeding or clotting disorders.      Allergies  No Known Allergies        MEDICATIONS  (STANDING):  citric acid/sodium citrate Solution 15 milliLiter(s) Oral every 6 hours  labetalol 200 milliGRAM(s) Oral two times a day  lactated ringers. 1000 milliLiter(s) (125 mL/Hr) IV Continuous <Continuous>  misoprostol Oral Solution 60 MICROGram(s) Oral every 2 hours  oxytocin Infusion 333.333 milliUNIT(s)/Min (1000 mL/Hr) IV Continuous <Continuous>    MEDICATIONS  (PRN):  ALBUTerol    90 MICROgram(s) HFA Inhaler 2 Puff(s) Inhalation every 6 hours PRN Shortness of Breath and/or Wheezing      PAST MEDICAL HISTORY:  Hypertension in pregnancy  Asthma, mild, no intubations    PAST SURGICAL HISTORY:  History of dilation and curettage,   H/O abdominoplasty,       FAMILY HISTORY:  Family history of myocardial infarction (Maternal grandfather  of MI)  FH: stroke (Paternal grandmother)  No family history of cancer        SOCIAL HISTORY: No EtOH or tobacco use    REVIEW OF SYSTEMS:  CONSTITUTIONAL: no fever  EYES/ENT: No visual changes;  no throat pain   NECK: No pain or stiffness  RESPIRATORY: no sob  CARDIOVASCULAR: No chest pain or palpitations  GASTROINTESTINAL: + abdominal/labor pain. No N/V/D/C  GENITOURINARY: + abdominal/labor pain. No dysuria or hematuria  NEUROLOGICAL: No numbness or weakness  SKIN: No itching, burning, rashes, or lesions   Psych: No depression   MSK: no joint pain  Allergy: no urticaria     Height (cm): 160 (11-30 @ 02:15)  Weight (kg): 97.5 ( 02:15)  BMI (kg/m2): 38.1 ( 02:15)  BSA (m2): 1.99 ( 02:15)    T(F): 98.06 (21 @ 05:30), Max: 98.8 (21 @ 02:15)  HR: 88 (21 @ 13:54)  BP: 136/82 (21 @ 13:39)  RR: 17 (21 @ 05:30)  SpO2: 98% (21 @ 09:38)  Wt(kg): --    GENERAL: Mild distress from labor pains  HEENT: EOMI, MMM, no oropharyngeal lesions or erythema appreciated  Pulm: no increased WOB, CTAB/L  CV: RRR, S1, S2, no m/g/r  ABDOMEN: + gravid. NT, ND, no masses felt, no HSM  MSK: nl ROM  EXTREMITIES:  no appreciable edema in b/l LE  Neuro: A&Ox3, no focal deficits  SKIN: warm and dry, no visible rash or lesions                          11.0   6.84  )-----------( 228      ( 2021 01:39 )             34.8           137  |  101  |  11  ----------------------------<  92  4.1   |  22  |  0.46<L>    Ca    10.5      2021 01:39    TPro  7.1  /  Alb  3.8  /  TBili  <0.2  /  DBili  x   /  AST  13  /  ALT  13  /  AlkPhos  138<H>        Uric Acid, Serum: 5.3 mg/dL ( @ 01:39)  Lactate Dehydrogenase, Serum: 182 U/L ( @ 01:39)

## 2021-11-30 NOTE — PROGRESS NOTE ADULT - ASSESSMENT
30yo  at 37w1d for IOL for cHTN. Called to patient bedside as patient was complaining of rectal pressure and increased pain with contractions. Patient requesting epidural.  - Will attempt to contact heme in order to see if patient can get an epidural   - Continue with PO cytotec as planned     Carly Hirschberg, MFM Fellow

## 2021-11-30 NOTE — OB PROVIDER H&P - NSHPPHYSICALEXAM_GEN_ALL_CORE
VS:  GA:  Cards: RRR  Pulm: CTAB  EFH:  [place]:  VE: 0.5/50/-3  TAUS: vertex VS:  Vital Signs Last 24 Hrs  T(C): 37.1 (30 Nov 2021 01:08), Max: 37.1 (30 Nov 2021 01:08)  T(F): 98.78 (30 Nov 2021 01:08), Max: 98.78 (30 Nov 2021 01:08)  HR: 75 (30 Nov 2021 02:24) (75 - 98)  BP: 144/95 (30 Nov 2021 02:09) (141/100 - 160/105)  BP(mean): --  RR: --  SpO2: 99% (30 Nov 2021 02:12) (98% - 100%)  GA: Well appearing, NAD   Cards: RRR  Pulm: CTAB  EFH: Baseline 145, moderate variability, accelerations present  [place]: Irregular  VE: 1/50/-3. External os approximately 2 cm dilated  TAUS: vertex

## 2021-12-01 ENCOUNTER — TRANSCRIPTION ENCOUNTER (OUTPATIENT)
Age: 31
End: 2021-12-01

## 2021-12-01 DIAGNOSIS — I10 ESSENTIAL (PRIMARY) HYPERTENSION: ICD-10-CM

## 2021-12-01 PROCEDURE — 59400 OBSTETRICAL CARE: CPT

## 2021-12-01 RX ORDER — LANOLIN
1 OINTMENT (GRAM) TOPICAL EVERY 6 HOURS
Refills: 0 | Status: DISCONTINUED | OUTPATIENT
Start: 2021-12-01 | End: 2021-12-04

## 2021-12-01 RX ORDER — DIPHENHYDRAMINE HCL 50 MG
25 CAPSULE ORAL ONCE
Refills: 0 | Status: COMPLETED | OUTPATIENT
Start: 2021-12-01 | End: 2021-12-01

## 2021-12-01 RX ORDER — DIPHENHYDRAMINE HCL 50 MG
25 CAPSULE ORAL EVERY 6 HOURS
Refills: 0 | Status: DISCONTINUED | OUTPATIENT
Start: 2021-12-01 | End: 2021-12-04

## 2021-12-01 RX ORDER — HYDROCORTISONE 1 %
1 OINTMENT (GRAM) TOPICAL EVERY 6 HOURS
Refills: 0 | Status: DISCONTINUED | OUTPATIENT
Start: 2021-12-01 | End: 2021-12-04

## 2021-12-01 RX ORDER — MAGNESIUM HYDROXIDE 400 MG/1
30 TABLET, CHEWABLE ORAL
Refills: 0 | Status: DISCONTINUED | OUTPATIENT
Start: 2021-12-01 | End: 2021-12-04

## 2021-12-01 RX ORDER — KETOROLAC TROMETHAMINE 30 MG/ML
30 SYRINGE (ML) INJECTION ONCE
Refills: 0 | Status: DISCONTINUED | OUTPATIENT
Start: 2021-12-01 | End: 2021-12-01

## 2021-12-01 RX ORDER — OXYCODONE HYDROCHLORIDE 5 MG/1
5 TABLET ORAL ONCE
Refills: 0 | Status: DISCONTINUED | OUTPATIENT
Start: 2021-12-01 | End: 2021-12-04

## 2021-12-01 RX ORDER — ACETAMINOPHEN 500 MG
975 TABLET ORAL ONCE
Refills: 0 | Status: COMPLETED | OUTPATIENT
Start: 2021-12-01 | End: 2021-12-01

## 2021-12-01 RX ORDER — OXYCODONE HYDROCHLORIDE 5 MG/1
5 TABLET ORAL
Refills: 0 | Status: DISCONTINUED | OUTPATIENT
Start: 2021-12-01 | End: 2021-12-04

## 2021-12-01 RX ORDER — PRAMOXINE HYDROCHLORIDE 150 MG/15G
1 AEROSOL, FOAM RECTAL EVERY 4 HOURS
Refills: 0 | Status: DISCONTINUED | OUTPATIENT
Start: 2021-12-01 | End: 2021-12-04

## 2021-12-01 RX ORDER — SIMETHICONE 80 MG/1
80 TABLET, CHEWABLE ORAL EVERY 4 HOURS
Refills: 0 | Status: DISCONTINUED | OUTPATIENT
Start: 2021-12-01 | End: 2021-12-04

## 2021-12-01 RX ORDER — TETANUS TOXOID, REDUCED DIPHTHERIA TOXOID AND ACELLULAR PERTUSSIS VACCINE, ADSORBED 5; 2.5; 8; 8; 2.5 [IU]/.5ML; [IU]/.5ML; UG/.5ML; UG/.5ML; UG/.5ML
0.5 SUSPENSION INTRAMUSCULAR ONCE
Refills: 0 | Status: DISCONTINUED | OUTPATIENT
Start: 2021-12-01 | End: 2021-12-04

## 2021-12-01 RX ORDER — SODIUM CHLORIDE 9 MG/ML
3 INJECTION INTRAMUSCULAR; INTRAVENOUS; SUBCUTANEOUS EVERY 8 HOURS
Refills: 0 | Status: DISCONTINUED | OUTPATIENT
Start: 2021-12-01 | End: 2021-12-04

## 2021-12-01 RX ORDER — IBUPROFEN 200 MG
600 TABLET ORAL EVERY 6 HOURS
Refills: 0 | Status: COMPLETED | OUTPATIENT
Start: 2021-12-01 | End: 2022-10-30

## 2021-12-01 RX ORDER — OXYTOCIN 10 UNIT/ML
333.33 VIAL (ML) INJECTION
Qty: 20 | Refills: 0 | Status: DISCONTINUED | OUTPATIENT
Start: 2021-12-01 | End: 2021-12-04

## 2021-12-01 RX ORDER — DIBUCAINE 1 %
1 OINTMENT (GRAM) RECTAL EVERY 6 HOURS
Refills: 0 | Status: DISCONTINUED | OUTPATIENT
Start: 2021-12-01 | End: 2021-12-04

## 2021-12-01 RX ORDER — AER TRAVELER 0.5 G/1
1 SOLUTION RECTAL; TOPICAL EVERY 4 HOURS
Refills: 0 | Status: DISCONTINUED | OUTPATIENT
Start: 2021-12-01 | End: 2021-12-04

## 2021-12-01 RX ORDER — BENZOCAINE 10 %
1 GEL (GRAM) MUCOUS MEMBRANE EVERY 6 HOURS
Refills: 0 | Status: DISCONTINUED | OUTPATIENT
Start: 2021-12-01 | End: 2021-12-04

## 2021-12-01 RX ORDER — ACETAMINOPHEN 500 MG
975 TABLET ORAL
Refills: 0 | Status: DISCONTINUED | OUTPATIENT
Start: 2021-12-01 | End: 2021-12-04

## 2021-12-01 RX ADMIN — Medication 25 MILLIGRAM(S): at 03:50

## 2021-12-01 RX ADMIN — Medication 1000 MILLIUNIT(S)/MIN: at 07:30

## 2021-12-01 RX ADMIN — SODIUM CHLORIDE 3 MILLILITER(S): 9 INJECTION INTRAMUSCULAR; INTRAVENOUS; SUBCUTANEOUS at 15:00

## 2021-12-01 RX ADMIN — SODIUM CHLORIDE 125 MILLILITER(S): 9 INJECTION, SOLUTION INTRAVENOUS at 06:44

## 2021-12-01 RX ADMIN — Medication 975 MILLIGRAM(S): at 03:00

## 2021-12-01 RX ADMIN — Medication 975 MILLIGRAM(S): at 02:50

## 2021-12-01 RX ADMIN — Medication 30 MILLIGRAM(S): at 12:30

## 2021-12-01 RX ADMIN — Medication 200 MILLIGRAM(S): at 17:37

## 2021-12-01 RX ADMIN — Medication 30 MILLIGRAM(S): at 13:17

## 2021-12-01 RX ADMIN — SODIUM CHLORIDE 3 MILLILITER(S): 9 INJECTION INTRAMUSCULAR; INTRAVENOUS; SUBCUTANEOUS at 21:05

## 2021-12-01 RX ADMIN — Medication 200 MILLIGRAM(S): at 05:46

## 2021-12-01 NOTE — OB RN DELIVERY SUMMARY - NSSELHIDDEN_OBGYN_ALL_OB_FT
[NS_DeliveryAttending2_OBGYN_ALL_OB_FT:[id number]],[NS_DeliveryAttending1_OBGYN_ALL_OB_FT:[id number]],[NS_DeliveryAssist1_OBGYN_ALL_OB_FT:[id number]],[NS_DeliveryRN_OBGYN_ALL_OB_FT:[id number]]

## 2021-12-01 NOTE — CHART NOTE - NSCHARTNOTEFT_GEN_A_CORE
R3 Labor Progress Note     Patient evaluated at bedside, uncomfortable.     SVE: 7.5/90/-2     - Continue Pitocin for augmentation     d/w Dr. Berry Chatterjee, PGY-3

## 2021-12-01 NOTE — DISCHARGE NOTE OB - NS MD DC FALL RISK RISK
For information on Fall & Injury Prevention, visit: [email or web address] OR  [email or web address] OR  https://www.cdc.gov/steadi/patient.html

## 2021-12-01 NOTE — DISCHARGE NOTE OB - MATERIALS PROVIDED
Vaccinations/[facility]  Screening Program/  Immunization Record/Guide to Postpartum Care/[facility] Hearing Screen Program/Shaken Baby Prevention Handout/Birth Certificate Instructions

## 2021-12-01 NOTE — DISCHARGE NOTE OB - PATIENT PORTAL LINK FT
You can access the FollowMyHealth Patient Portal offered by [facility] by registering at the following website: [email or web address]. By joining [facility]’s FollowMyHealth portal, you will also be able to view your health information using other applications (apps) compatible with our system.

## 2021-12-01 NOTE — OB RN DELIVERY SUMMARY - NS_NEWBORNAMRN_OBGYN_ALL_OB_FT
Patient:   RHIANNA MARINELLI            MRN: [id number]            FIN: [id number]              Age:   47 years     Sex:  FEMALE     :  68   Associated Diagnoses:   None   Author:   LAZARO BRANDON     Subjective   Additional information Assuming care of patient today.  Chart, labs and imaging reviewed  Pt states she is doing ok. States she does have pain in her legs but doesn't want to take pain meds due to episode of hyotension and AMS due to narcotics.       Health Status   Allergies:    Allergic Reactions (Selected)  NKA  No Known Medication Allergies   Current medications:    Medications (31) Active  Scheduled: (16)  Ascorbic acid 250 mg tab  250 mg 1 tab, Oral, Daily  Aspirin 81 mg chew tab  81 mg 1 tab, Oral, Daily  Baclofen 10 mg tab  40 mg 4 tab, Oral, TID  cefTRIAXone  2,000 mg 20 mL, Slow IV Push, Daily  Cholecalciferol 400 unit/mL oral liquid syringe  400 unit 1 mL, Oral, Daily  Enoxaparin 40 mg/0.4 mL syringe  40 mg 0.4 mL, Subcutaneous, Daily  Famotidine 20 mg tab  20 mg 1 tab, Oral, Q Bedtime  Ferrous sulfate 325 mg tab [Fe 65 mg]  325 mg 1 tab, Oral, TID [with meals]  Gabapentin 400 mg cap  800 mg 2 cap, Oral, Q8H  insulin glargine  15 unit 0.15 mL, Subcutaneous, Q Bedtime  Insulin human lispro 100 unit/mL inj 3 mL BULK  2-10 unit, Subcutaneous, QID [before meals & HS]  Multivitamin with minerals chew tab  1 tab, Chewed, Daily  Povidone iodine 10% ointment 28 gm  1 application, Topical, Daily  Sodium chloride PF 0.9% flush inj 10 mL  20 mL, Flush, Q12H  Sodium hypochlorite 0.125% (/ str) irrigation 500 mL  1 application, Topical, Daily  Zinc oxide 20% ointment 30 gm  1 application, Topical, Daily  Continuous: (3)  DOPamine 800 mg [5 mcg/kg/min] + premixed in Dextrose 5% 250 mL  250 mL, IV, 11.3 mL/hr  NORepinephrine 8 mg [10 mcg/min] + premixed in Sodium Chloride 0.9% 250 mL  250 mL, IV, 18.75 mL/hr  Sodium Chloride 0.9% 250 mL  250 mL, IV, 1,500 mL/hr  PRN: (12)  Acetaminophen 325  mg tab  650 mg 2 tab, Oral, Q4H  Acetaminophen 325 mg tab  650 mg 2 tab, Oral, Q4H  Acetaminophen 650 mg suppos  650 mg 1 suppository, Rectal, Q4H  Acetaminophen 650 mg suppos  650 mg 1 suppository, Rectal, Q4H  Atropine 1 mg/1 mL inj SDV  0.6 mg 0.6 mL, IV Push, Once (scheduled)  Furosemide 40 mg/4 mL inj  40 mg 4 mL, Slow IV Push, As Directed PRN  LORazepam 2 mg/1 mL inj SDV  1 mg 0.5 mL, Slow IV Push, As Directed PRN  Morphine PF 2 mg/1 mL inj SDV  2 mg 1 mL, IV Push, Q5 Minutes  Naloxone 2 mg/2 mL inj  1 mg 1 mL, IV Push, As Directed PRN  NitroGLYCERIN 0.4 mg SL tab 25s  0.4 mg 1 tab, SL, As Directed PRN  Sodium chloride PF 0.9% flush inj 10 mL  10 mL, Flush, As Directed PRN  Sodium chloride PF 0.9% flush inj 10 mL  20 mL, Flush, As Directed PRN      Objective   VS/Measurements     Vitals between:   09-JUN-2019 20:26:16   TO   10-GAGANDEEP-2019 20:26:16                   LAST RESULT MINIMUM MAXIMUM  Temperature 36.4 36.4 36.9  Heart Rate 97 93 103  Respiratory Rate 16 16 18  NISBP           113 108 121  NIDBP           67 67 79  NIMBP           83 83 83  SpO2                    99 97 99    Neck:  Supple.    Respiratory:  Lungs are clear to auscultation, Respirations are non-labored.   Cardiovascular:  Normal rate, Regular rhythm.    Gastrointestinal:  Soft.      Results Review   General results   Interpretation:   Labs between:  09-JUN-2019 20:26 to 10-GAGANDEEP-2019 20:26  POC GLU:                 Latest Result  Latest Date  Minimum  Min Date  Maximum  Max Date                             (H) 138  10-GAGANDEEP-2019 (H) 138  10-GAGANDEEP-2019 (H) 172  09-JUN-2019                               Impression and Plan   Assessment and Plan:       Diagnosis: 1. Proteus Bacteremia- had midline, ID recs noted.  Pt to complete 2 weeks Rocephin  2.Mult PU's- Wound care notes appreciated  3. DASHAWN on CKD  4Urinary Incontinence- s/p ileal onduit  5. Paraplegia  6. DM  .   [id number]

## 2021-12-01 NOTE — OB RN DELIVERY SUMMARY - NS_SEPSISRSKCALC_OBGYN_ALL_OB_FT
EOS calculated successfully. EOS Risk Factor: 0.5/1000 live births ([facility] national incidence); GA=37w2d; Temp=98.8; ROM=7.717; GBS='Unknown'; Antibiotics='No antibiotics or any antibiotics < 2 hrs prior to birth'

## 2021-12-01 NOTE — DISCHARGE NOTE OB - CARE PLAN
Principal Discharge DX:	 (normal spontaneous vaginal delivery)  Assessment and plan of treatment:	yang   1

## 2021-12-01 NOTE — DISCHARGE NOTE OB - MEDICATION SUMMARY - MEDICATIONS TO TAKE
I will START or STAY ON the medications listed below when I get home from the hospital:    ibuprofen 600 mg oral tablet  -- 1 tab(s) by mouth every 6 hours  -- Indication: For  (normal spontaneous vaginal delivery)    acetaminophen 325 mg oral tablet  -- 3 tab(s) by mouth   -- Indication: For  (normal spontaneous vaginal delivery)    labetalol 200 mg oral tablet  -- 1 tab(s) by mouth 2 times a day  -- Indication: For Chronic hypertension    albuterol 90 mcg/inh inhalation aerosol  -- 2 puff(s) inhaled every 6 hours, As needed, Shortness of Breath and/or Wheezing  -- Indication: For asthma    Prenatal Multivitamins with Folic Acid 1 mg oral tablet  -- 1 tab(s) by mouth once a day  -- Indication: For  (normal spontaneous vaginal delivery)   I will START or STAY ON the medications listed below when I get home from the hospital:    ibuprofen 600 mg oral tablet  -- 1 tab(s) by mouth every 6 hours, As Needed  -- Indication: For  (normal spontaneous vaginal delivery)    acetaminophen 325 mg oral tablet  -- 3 tab(s) by mouth every 6 hours, As Needed  -- Indication: For  (normal spontaneous vaginal delivery)    labetalol 200 mg oral tablet  -- 1 tab(s) by mouth 2 times a day  -- Indication: For Chronic hypertension    albuterol 90 mcg/inh inhalation aerosol  -- 2 puff(s) inhaled every 6 hours, As needed, Shortness of Breath and/or Wheezing  -- Indication: For asthma    Prenatal Multivitamins with Folic Acid 1 mg oral tablet  -- 1 tab(s) by mouth once a day  -- Indication: For  (normal spontaneous vaginal delivery)

## 2021-12-01 NOTE — DISCHARGE NOTE OB - CARE PROVIDER_API CALL
Mary Ellen Palma)  Obstetrics and Gynecology  [facility] [place] [address]  Phone: [phone]  Fax: [phone]  Follow Up Time:

## 2021-12-01 NOTE — OB PROVIDER LABOR PROGRESS NOTE - ASSESSMENT
A/P: 31y P4 admitted for IOL for cHTN  - HELLP wnl  - Labor: spPO/CB, on pit, AROMed clear  - Fetus: category 1  - GBS: neg  - Analgesia: epi in place    continue to increased pitocin    MIKKI Ag PGY4  d/w Dr. Lazo, in house
 @37w1d presenting for induction of labor due to to cHTN. CB placed, 60 cc instilled in each balloon.     Plan   - for po cytotec  - Anticipate      d/w Dr. Savi Castillo PGY-2
31 y.o.  at 37w2d presenting for induction of labor due to cHTN, HELLP wnl s/p po/CB started on pitocin at 815p, AROM@1145p now with increasing pain with contractions, exam largely unchanged from prior.     - epidural top off   - Anticipate      to be d/w Dr. Berry Castillo PGY-2

## 2021-12-01 NOTE — OB PROVIDER LABOR PROGRESS NOTE - NS_OBIHIFHRDETAILS_OBGYN_ALL_OB_FT
Baseline 130s, moderate variability, accelerations present, no decels
130, mod sly, + accels, no decels
Baseline 150, moderate variability, accelerations not present, occasional variable deceleration with recovery.

## 2021-12-01 NOTE — OB PROVIDER DELIVERY SUMMARY - NSSELHIDDEN_OBGYN_ALL_OB_FT
[NS_DeliveryAttending2_OBGYN_ALL_OB_FT:[id number]],[NS_DeliveryAttending1_OBGYN_ALL_OB_FT:[id number]],[NS_DeliveryAssist1_OBGYN_ALL_OB_FT:[id number]]

## 2021-12-01 NOTE — OB PROVIDER LABOR PROGRESS NOTE - NS_SUBJECTIVE/OBJECTIVE_OBGYN_ALL_OB_FT
Patient seen bedside for CB placement, not requesting anything for pain at this time.
Patient seen bedside for increasing pain with contractions, has been pressing epidural button.
PGY4 Labor Note    Patient seen and evaluated at bedside.  Denies complaints.  Comfortable w/ epidural.      T(C): 36.9 (11-30-21 @ 22:45), Max: 37.1 (11-30-21 @ 01:08)  HR: 82 (11-30-21 @ 23:52) (70 - 102)  BP: 147/83 (11-30-21 @ 23:50) (120/72 - 171/90)  RR: 17 (11-30-21 @ 05:30) (17 - 18)  SpO2: 98% (11-30-21 @ 23:52) (91% - 100%)

## 2021-12-01 NOTE — DISCHARGE NOTE OB - BREAST MILK IS MORE DIGESTIBLE, MAKING VOMITING, DIARRHEA, GAS AND CONSTIPATION LESS COMMON
"Antepartum note:    S: Pt is comfortable, occasional contractions.    A/P: 35 yo  at 33+5/7 wks with Di/Di twins.     O:  /66  Pulse 88  Temp 98.8  F (37.1  C) (Temporal)  Resp 18  Ht 1.676 m (5' 6\")  Wt 111.1 kg (245 lb)  LMP 11/10/2016  BMI 39.54 kg/m2  SVE: 5.5 per Dr. Dozier on 7/3  TOCO: baseline irritability, 6/hour.   FHR: reassuring A and B. Difficult to trace.     A/P: 35yo  @ 33+5/7 wga di-di twin gestation, HD#11 admitted for PTL, advanced cervical dilation.  AVSS.      1. Arrested PTL/ACD:  - s/p BMZ, s/p MgSO4  - continue nifedipine for maintenance tocolysis until 34 wga per MFM.  Appreciate recs.    - GBS neg      2. Maternal Status: stable  - regular diet as tolerated  - SCDs when in bed  - [employer] - declines blood products but ok with cell saver (phone [phone]).      3. Fetal Statuses: overall reassuring x 2  - NSTs Qshift.  BPP 2x/wk.    - last growth US 7/3. BPP was also   - s/p BMZ for FLM  - per MFM, consider repeat course of BMZ closer to 34 wga if in labor      4. Dispo: continue inpatient management until delivery due to ACD; pt also lives 45min from hospital.      Linda Meraz  " Statement Selected

## 2021-12-02 LAB
HCT VFR BLD CALC: 27.7 % — LOW (ref 34.5–45)
HGB BLD-MCNC: 9.3 G/DL — LOW (ref 11.5–15.5)

## 2021-12-02 RX ORDER — LABETALOL HCL 100 MG
1 TABLET ORAL
Qty: 0 | Refills: 0 | DISCHARGE
Start: 2021-12-02

## 2021-12-02 RX ORDER — ACETAMINOPHEN 500 MG
3 TABLET ORAL
Qty: 0 | Refills: 0 | DISCHARGE
Start: 2021-12-02

## 2021-12-02 RX ORDER — SENNA PLUS 8.6 MG/1
2 TABLET ORAL AT BEDTIME
Refills: 0 | Status: DISCONTINUED | OUTPATIENT
Start: 2021-12-02 | End: 2021-12-04

## 2021-12-02 RX ORDER — IBUPROFEN 200 MG
600 TABLET ORAL EVERY 6 HOURS
Refills: 0 | Status: DISCONTINUED | OUTPATIENT
Start: 2021-12-02 | End: 2021-12-04

## 2021-12-02 RX ORDER — ALBUTEROL 90 UG/1
2 AEROSOL, METERED ORAL
Qty: 0 | Refills: 0 | DISCHARGE
Start: 2021-12-02

## 2021-12-02 RX ORDER — LABETALOL HCL 100 MG
1 TABLET ORAL
Qty: 0 | Refills: 0 | DISCHARGE

## 2021-12-02 RX ORDER — IBUPROFEN 200 MG
1 TABLET ORAL
Qty: 0 | Refills: 0 | DISCHARGE
Start: 2021-12-02

## 2021-12-02 RX ORDER — ALBUTEROL 90 UG/1
0 AEROSOL, METERED ORAL
Qty: 0 | Refills: 0 | DISCHARGE

## 2021-12-02 RX ADMIN — Medication 200 MILLIGRAM(S): at 14:47

## 2021-12-02 RX ADMIN — Medication 600 MILLIGRAM(S): at 23:45

## 2021-12-02 RX ADMIN — Medication 600 MILLIGRAM(S): at 13:10

## 2021-12-02 RX ADMIN — SENNA PLUS 2 TABLET(S): 8.6 TABLET ORAL at 19:57

## 2021-12-02 RX ADMIN — Medication 200 MILLIGRAM(S): at 02:57

## 2021-12-02 RX ADMIN — Medication 600 MILLIGRAM(S): at 14:00

## 2021-12-02 RX ADMIN — Medication 975 MILLIGRAM(S): at 09:30

## 2021-12-02 RX ADMIN — Medication 975 MILLIGRAM(S): at 08:30

## 2021-12-02 RX ADMIN — Medication 600 MILLIGRAM(S): at 22:45

## 2021-12-02 RX ADMIN — SODIUM CHLORIDE 3 MILLILITER(S): 9 INJECTION INTRAMUSCULAR; INTRAVENOUS; SUBCUTANEOUS at 06:27

## 2021-12-03 LAB
ALBUMIN SERPL ELPH-MCNC: 3.4 G/DL — SIGNIFICANT CHANGE UP (ref 3.3–5)
ALP SERPL-CCNC: 99 U/L — SIGNIFICANT CHANGE UP (ref 40–120)
ALT FLD-CCNC: 11 U/L — SIGNIFICANT CHANGE UP (ref 4–33)
ANION GAP SERPL CALC-SCNC: 10 MMOL/L — SIGNIFICANT CHANGE UP (ref 7–14)
APTT BLD: 28.6 SEC — SIGNIFICANT CHANGE UP (ref 27–36.3)
AST SERPL-CCNC: 13 U/L — SIGNIFICANT CHANGE UP (ref 4–32)
BASOPHILS # BLD AUTO: 0.09 K/UL — SIGNIFICANT CHANGE UP (ref 0–0.2)
BASOPHILS NFR BLD AUTO: 1.1 % — SIGNIFICANT CHANGE UP (ref 0–2)
BILIRUB SERPL-MCNC: <0.2 MG/DL — SIGNIFICANT CHANGE UP (ref 0.2–1.2)
BUN SERPL-MCNC: 14 MG/DL — SIGNIFICANT CHANGE UP (ref 7–23)
CALCIUM SERPL-MCNC: 9.6 MG/DL — SIGNIFICANT CHANGE UP (ref 8.4–10.5)
CHLORIDE SERPL-SCNC: 104 MMOL/L — SIGNIFICANT CHANGE UP (ref 98–107)
CO2 SERPL-SCNC: 25 MMOL/L — SIGNIFICANT CHANGE UP (ref 22–31)
CREAT SERPL-MCNC: 0.71 MG/DL — SIGNIFICANT CHANGE UP (ref 0.5–1.3)
EOSINOPHIL # BLD AUTO: 0.35 K/UL — SIGNIFICANT CHANGE UP (ref 0–0.5)
EOSINOPHIL NFR BLD AUTO: 4.2 % — SIGNIFICANT CHANGE UP (ref 0–6)
FIBRINOGEN PPP-MCNC: 703 MG/DL — HIGH (ref 290–520)
GLUCOSE SERPL-MCNC: 94 MG/DL — SIGNIFICANT CHANGE UP (ref 70–99)
HCT VFR BLD CALC: 30.6 % — LOW (ref 34.5–45)
HGB BLD-MCNC: 9.8 G/DL — LOW (ref 11.5–15.5)
IANC: 4.31 K/UL — SIGNIFICANT CHANGE UP (ref 1.5–8.5)
IMM GRANULOCYTES NFR BLD AUTO: 2.6 % — HIGH (ref 0–1.5)
INR BLD: 0.99 RATIO — SIGNIFICANT CHANGE UP (ref 0.88–1.16)
LDH SERPL L TO P-CCNC: 196 U/L — SIGNIFICANT CHANGE UP (ref 135–225)
LYMPHOCYTES # BLD AUTO: 2.67 K/UL — SIGNIFICANT CHANGE UP (ref 1–3.3)
LYMPHOCYTES # BLD AUTO: 31.9 % — SIGNIFICANT CHANGE UP (ref 13–44)
MCHC RBC-ENTMCNC: 28.3 PG — SIGNIFICANT CHANGE UP (ref 27–34)
MCHC RBC-ENTMCNC: 32 GM/DL — SIGNIFICANT CHANGE UP (ref 32–36)
MCV RBC AUTO: 88.4 FL — SIGNIFICANT CHANGE UP (ref 80–100)
MONOCYTES # BLD AUTO: 0.73 K/UL — SIGNIFICANT CHANGE UP (ref 0–0.9)
MONOCYTES NFR BLD AUTO: 8.7 % — SIGNIFICANT CHANGE UP (ref 2–14)
NEUTROPHILS # BLD AUTO: 4.31 K/UL — SIGNIFICANT CHANGE UP (ref 1.8–7.4)
NEUTROPHILS NFR BLD AUTO: 51.5 % — SIGNIFICANT CHANGE UP (ref 43–77)
NRBC # BLD: 0 /100 WBCS — SIGNIFICANT CHANGE UP
NRBC # FLD: 0 K/UL — SIGNIFICANT CHANGE UP
PLATELET # BLD AUTO: 234 K/UL — SIGNIFICANT CHANGE UP (ref 150–400)
POTASSIUM SERPL-MCNC: 3.7 MMOL/L — SIGNIFICANT CHANGE UP (ref 3.5–5.3)
POTASSIUM SERPL-SCNC: 3.7 MMOL/L — SIGNIFICANT CHANGE UP (ref 3.5–5.3)
PROT SERPL-MCNC: 6.4 G/DL — SIGNIFICANT CHANGE UP (ref 6–8.3)
PROTHROM AB SERPL-ACNC: 11.4 SEC — SIGNIFICANT CHANGE UP (ref 10.6–13.6)
RBC # BLD: 3.46 M/UL — LOW (ref 3.8–5.2)
RBC # FLD: 14.6 % — HIGH (ref 10.3–14.5)
SODIUM SERPL-SCNC: 139 MMOL/L — SIGNIFICANT CHANGE UP (ref 135–145)
URATE SERPL-MCNC: 6.2 MG/DL — SIGNIFICANT CHANGE UP (ref 2.5–7)
WBC # BLD: 8.37 K/UL — SIGNIFICANT CHANGE UP (ref 3.8–10.5)
WBC # FLD AUTO: 8.37 K/UL — SIGNIFICANT CHANGE UP (ref 3.8–10.5)

## 2021-12-03 RX ADMIN — Medication 600 MILLIGRAM(S): at 17:37

## 2021-12-03 RX ADMIN — Medication 975 MILLIGRAM(S): at 22:10

## 2021-12-03 RX ADMIN — SODIUM CHLORIDE 3 MILLILITER(S): 9 INJECTION INTRAMUSCULAR; INTRAVENOUS; SUBCUTANEOUS at 17:32

## 2021-12-03 RX ADMIN — Medication 975 MILLIGRAM(S): at 22:40

## 2021-12-03 RX ADMIN — Medication 600 MILLIGRAM(S): at 06:54

## 2021-12-03 RX ADMIN — Medication 600 MILLIGRAM(S): at 23:38

## 2021-12-03 RX ADMIN — SENNA PLUS 2 TABLET(S): 8.6 TABLET ORAL at 22:11

## 2021-12-03 RX ADMIN — MAGNESIUM HYDROXIDE 30 MILLILITER(S): 400 TABLET, CHEWABLE ORAL at 08:45

## 2021-12-03 RX ADMIN — OXYCODONE HYDROCHLORIDE 5 MILLIGRAM(S): 5 TABLET ORAL at 18:42

## 2021-12-03 RX ADMIN — Medication 200 MILLIGRAM(S): at 04:31

## 2021-12-03 RX ADMIN — Medication 975 MILLIGRAM(S): at 08:46

## 2021-12-03 RX ADMIN — Medication 200 MILLIGRAM(S): at 15:06

## 2021-12-04 VITALS
DIASTOLIC BLOOD PRESSURE: 86 MMHG | OXYGEN SATURATION: 99 % | RESPIRATION RATE: 17 BRPM | TEMPERATURE: 98 F | SYSTOLIC BLOOD PRESSURE: 130 MMHG | HEART RATE: 84 BPM

## 2021-12-04 LAB
ALBUMIN SERPL ELPH-MCNC: 3.4 G/DL — SIGNIFICANT CHANGE UP (ref 3.3–5)
ALP SERPL-CCNC: 98 U/L — SIGNIFICANT CHANGE UP (ref 40–120)
ALT FLD-CCNC: 14 U/L — SIGNIFICANT CHANGE UP (ref 4–33)
ANION GAP SERPL CALC-SCNC: 10 MMOL/L — SIGNIFICANT CHANGE UP (ref 7–14)
APTT BLD: 29.8 SEC — SIGNIFICANT CHANGE UP (ref 27–36.3)
AST SERPL-CCNC: 18 U/L — SIGNIFICANT CHANGE UP (ref 4–32)
BASOPHILS # BLD AUTO: 0.07 K/UL — SIGNIFICANT CHANGE UP (ref 0–0.2)
BASOPHILS NFR BLD AUTO: 1 % — SIGNIFICANT CHANGE UP (ref 0–2)
BILIRUB SERPL-MCNC: <0.2 MG/DL — SIGNIFICANT CHANGE UP (ref 0.2–1.2)
BUN SERPL-MCNC: 13 MG/DL — SIGNIFICANT CHANGE UP (ref 7–23)
CALCIUM SERPL-MCNC: 9.2 MG/DL — SIGNIFICANT CHANGE UP (ref 8.4–10.5)
CHLORIDE SERPL-SCNC: 103 MMOL/L — SIGNIFICANT CHANGE UP (ref 98–107)
CO2 SERPL-SCNC: 25 MMOL/L — SIGNIFICANT CHANGE UP (ref 22–31)
CREAT SERPL-MCNC: 0.56 MG/DL — SIGNIFICANT CHANGE UP (ref 0.5–1.3)
EOSINOPHIL # BLD AUTO: 0.32 K/UL — SIGNIFICANT CHANGE UP (ref 0–0.5)
EOSINOPHIL NFR BLD AUTO: 4.5 % — SIGNIFICANT CHANGE UP (ref 0–6)
FIBRINOGEN PPP-MCNC: 625 MG/DL — HIGH (ref 290–520)
GLUCOSE SERPL-MCNC: 87 MG/DL — SIGNIFICANT CHANGE UP (ref 70–99)
HCT VFR BLD CALC: 30.1 % — LOW (ref 34.5–45)
HGB BLD-MCNC: 9.6 G/DL — LOW (ref 11.5–15.5)
IANC: 3.71 K/UL — SIGNIFICANT CHANGE UP (ref 1.5–8.5)
IMM GRANULOCYTES NFR BLD AUTO: 2.1 % — HIGH (ref 0–1.5)
INR BLD: 0.99 RATIO — SIGNIFICANT CHANGE UP (ref 0.88–1.16)
LDH SERPL L TO P-CCNC: 224 U/L — SIGNIFICANT CHANGE UP (ref 135–225)
LYMPHOCYTES # BLD AUTO: 2.07 K/UL — SIGNIFICANT CHANGE UP (ref 1–3.3)
LYMPHOCYTES # BLD AUTO: 29.3 % — SIGNIFICANT CHANGE UP (ref 13–44)
MCHC RBC-ENTMCNC: 28.9 PG — SIGNIFICANT CHANGE UP (ref 27–34)
MCHC RBC-ENTMCNC: 31.9 GM/DL — LOW (ref 32–36)
MCV RBC AUTO: 90.7 FL — SIGNIFICANT CHANGE UP (ref 80–100)
MONOCYTES # BLD AUTO: 0.74 K/UL — SIGNIFICANT CHANGE UP (ref 0–0.9)
MONOCYTES NFR BLD AUTO: 10.5 % — SIGNIFICANT CHANGE UP (ref 2–14)
NEUTROPHILS # BLD AUTO: 3.71 K/UL — SIGNIFICANT CHANGE UP (ref 1.8–7.4)
NEUTROPHILS NFR BLD AUTO: 52.6 % — SIGNIFICANT CHANGE UP (ref 43–77)
NRBC # BLD: 0 /100 WBCS — SIGNIFICANT CHANGE UP
NRBC # FLD: 0 K/UL — SIGNIFICANT CHANGE UP
PLATELET # BLD AUTO: 261 K/UL — SIGNIFICANT CHANGE UP (ref 150–400)
POTASSIUM SERPL-MCNC: 4.2 MMOL/L — SIGNIFICANT CHANGE UP (ref 3.5–5.3)
POTASSIUM SERPL-SCNC: 4.2 MMOL/L — SIGNIFICANT CHANGE UP (ref 3.5–5.3)
PROT SERPL-MCNC: 6.6 G/DL — SIGNIFICANT CHANGE UP (ref 6–8.3)
PROTHROM AB SERPL-ACNC: 11.3 SEC — SIGNIFICANT CHANGE UP (ref 10.6–13.6)
RBC # BLD: 3.32 M/UL — LOW (ref 3.8–5.2)
RBC # FLD: 14.6 % — HIGH (ref 10.3–14.5)
SODIUM SERPL-SCNC: 138 MMOL/L — SIGNIFICANT CHANGE UP (ref 135–145)
URATE SERPL-MCNC: 6.5 MG/DL — SIGNIFICANT CHANGE UP (ref 2.5–7)
WBC # BLD: 7.06 K/UL — SIGNIFICANT CHANGE UP (ref 3.8–10.5)
WBC # FLD AUTO: 7.06 K/UL — SIGNIFICANT CHANGE UP (ref 3.8–10.5)

## 2021-12-04 RX ADMIN — Medication 200 MILLIGRAM(S): at 03:24

## 2021-12-04 RX ADMIN — Medication 200 MILLIGRAM(S): at 14:15

## 2021-12-04 RX ADMIN — OXYCODONE HYDROCHLORIDE 5 MILLIGRAM(S): 5 TABLET ORAL at 10:58

## 2021-12-04 RX ADMIN — Medication 975 MILLIGRAM(S): at 10:58

## 2021-12-04 RX ADMIN — OXYCODONE HYDROCHLORIDE 5 MILLIGRAM(S): 5 TABLET ORAL at 11:45

## 2021-12-04 RX ADMIN — OXYCODONE HYDROCHLORIDE 5 MILLIGRAM(S): 5 TABLET ORAL at 06:04

## 2021-12-04 RX ADMIN — Medication 600 MILLIGRAM(S): at 14:00

## 2021-12-04 RX ADMIN — Medication 975 MILLIGRAM(S): at 03:24

## 2021-12-04 RX ADMIN — Medication 600 MILLIGRAM(S): at 06:03

## 2021-12-04 RX ADMIN — Medication 975 MILLIGRAM(S): at 03:54

## 2021-12-04 RX ADMIN — Medication 600 MILLIGRAM(S): at 06:33

## 2021-12-04 RX ADMIN — Medication 975 MILLIGRAM(S): at 11:45

## 2021-12-04 RX ADMIN — Medication 600 MILLIGRAM(S): at 00:08

## 2021-12-04 RX ADMIN — Medication 600 MILLIGRAM(S): at 13:08

## 2021-12-04 RX ADMIN — OXYCODONE HYDROCHLORIDE 5 MILLIGRAM(S): 5 TABLET ORAL at 06:34

## 2021-12-04 RX ADMIN — SODIUM CHLORIDE 3 MILLILITER(S): 9 INJECTION INTRAMUSCULAR; INTRAVENOUS; SUBCUTANEOUS at 06:16

## 2021-12-04 NOTE — PROVIDER CONTACT NOTE (OTHER) - ACTION/TREATMENT ORDERED:
Patient repots partial relief with Motrin 600mg PO, given Oxycodone 5mg po, patient reports pain 0/10.
Uri Lincoln, cancel discharge to home on 12/3/21. Patient continues to deny headache, epigastric pain and visual disturbances.
Give Labetalol. Resident to assess pt and order PIH labs. Discontinue oxycodone, give Motrin/Tylenol for cramps only.
No treatment ordered, stated that patient will be assessed during rounds

## 2021-12-04 NOTE — PROGRESS NOTE ADULT - SUBJECTIVE AND OBJECTIVE BOX
OB Attending Progress Note:  PPD#1    S: 30yo PPD#1 s/p . Patient feels well. Pain is well controlled. She is tolerating a regular diet and passing flatus. She is voiding spontaneously. and ambulating without difficulty. She is breastfeeding. Denies CP/SOB. Denies lightheadedness/dizziness. Denies N/V/D.    O:  Vitals:  Vital Signs Last 24 Hrs  T(C): 36.6 (02 Dec 2021 04:46), Max: 37.2 (01 Dec 2021 20:10)  T(F): 97.8 (02 Dec 2021 04:46), Max: 98.9 (01 Dec 2021 20:10)  HR: 88 (02 Dec 2021 04:46) (75 - 114)  BP: 129/80 (02 Dec 2021 04:46) (124/77 - 149/92)  BP(mean): --  RR: 17 (02 Dec 2021 04:46) (17 - 19)  SpO2: 99% (02 Dec 2021 04:46) (94% - 100%)    MEDICATIONS  (STANDING):  acetaminophen     Tablet .. 975 milliGRAM(s) Oral <User Schedule>  diphtheria/tetanus/pertussis (acellular) Vaccine (ADAcel) 0.5 milliLiter(s) IntraMuscular once  ibuprofen  Tablet. 600 milliGRAM(s) Oral every 6 hours  labetalol 200 milliGRAM(s) Oral two times a day  oxytocin Infusion 333.333 milliUNIT(s)/Min (1000 mL/Hr) IV Continuous <Continuous>  oxytocin Infusion 333.333 milliUNIT(s)/Min (1000 mL/Hr) IV Continuous <Continuous>  prenatal multivitamin 1 Tablet(s) Oral daily  sodium chloride 0.9% lock flush 3 milliLiter(s) IV Push every 8 hours      Labs:  Blood type: O Positive  Rubella IgG: RPR: Negative                          9.3<L>   -- >-----------< --    (  @ 05:50 )             27.7<L>                        11.2<L>   8.48 >-----------< 198    ( 11-30 @ 16:54 )             34.8                        11.0<L>   6.84 >-----------< 228    (  @ 01:39 )             34.8    21 @ 16:54      138  |  104  |  7   ----------------------------<  99  3.7   |  23  |  0.47<L>    21 @ 01:39      137  |  101  |  11  ----------------------------<  92  4.1   |  22  |  0.46<L>        Ca    9.1      2021 16:54  Ca    10.5      2021 01:39    TPro  6.6  /  Alb  3.3  /  TBili  0.2  /  DBili  x   /  AST  13  /  ALT  12  /  AlkPhos  126<H>  21 @ 16:54  TPro  7.1  /  Alb  3.8  /  TBili  <0.2  /  DBili  x   /  AST  13  /  ALT  13  /  AlkPhos  138<H>  21 @ 01:39          Physical Exam:  General: NAD  CV: RR  Pulm: Breathing comfortably on RA  Abdomen: soft, non-tender, non-distended, +BS, fundus firm  Vaginal: Lochia wnl  Extremities: No erythema/edema    A/P: 30yo PPD#1 s/p .   - Pain well controlled, continue current pain regimen  - Increase ambulation, SCDs when not ambulating  - Continue regular diet  - Discharge planning tomorrow  - Continue Labetalol  - BP precautions reviewed    Akila Barron MD
SUBJECTIVE:    Pain: Controlled    Complaints: C/O Neck pain and headache    MILESTONES:     Alert and oriented x 3  [x  ]  Out of bed /ambulating [  x]    Voiding [x  ]  Due to void [  ]    Tolerating a regular diet.    Infant feeding:   Breast [ X ]   Bottle [  ]     Both [  ]                         Feeding related issues or concerns:    Objective   T(C): 36.7 (21 @ 17:58), Max: 37.1 (21 @ 21:36)  HR: 88 (21 @ 17:58) (79 - 94)  BP: 144/81 (21 @ 17:58) (136/82 - 144/100)  RR: 18 (21 @ 17:58) (16 - 19)  SpO2: 100% (21 @ 17:58) (99% - 100%)  Wt(kg): --                        9.3    x     )-----------( x        ( 02 Dec 2021 05:50 )             27.7         Blood Type: O Positive    RPR: Negative          MEDICATIONS  (STANDING):  acetaminophen     Tablet .. 975 milliGRAM(s) Oral <User Schedule>  diphtheria/tetanus/pertussis (acellular) Vaccine (ADAcel) 0.5 milliLiter(s) IntraMuscular once  ibuprofen  Tablet. 600 milliGRAM(s) Oral every 6 hours  labetalol 200 milliGRAM(s) Oral two times a day  oxytocin Infusion 333.333 milliUNIT(s)/Min (1000 mL/Hr) IV Continuous <Continuous>  oxytocin Infusion 333.333 milliUNIT(s)/Min (1000 mL/Hr) IV Continuous <Continuous>  prenatal multivitamin 1 Tablet(s) Oral daily  senna 2 Tablet(s) Oral at bedtime  sodium chloride 0.9% lock flush 3 milliLiter(s) IV Push every 8 hours    MEDICATIONS  (PRN):  ALBUTerol    90 MICROgram(s) HFA Inhaler 2 Puff(s) Inhalation every 6 hours PRN Shortness of Breath and/or Wheezing  benzocaine 20%/menthol 0.5% Spray 1 Spray(s) Topical every 6 hours PRN for Perineal discomfort  dibucaine 1% Ointment 1 Application(s) Topical every 6 hours PRN Perineal discomfort  diphenhydrAMINE 25 milliGRAM(s) Oral every 6 hours PRN Pruritus  hydrocortisone 1% Cream 1 Application(s) Topical every 6 hours PRN Moderate Pain (4-6)  lanolin Ointment 1 Application(s) Topical every 6 hours PRN nipple soreness  magnesium hydroxide Suspension 30 milliLiter(s) Oral two times a day PRN Constipation  oxyCODONE    IR 5 milliGRAM(s) Oral every 3 hours PRN Moderate to Severe Pain (4-10)  oxyCODONE    IR 5 milliGRAM(s) Oral once PRN Moderate to Severe Pain (4-10)  pramoxine 1%/zinc 5% Cream 1 Application(s) Topical every 4 hours PRN Moderate Pain (4-6)  simethicone 80 milliGRAM(s) Chew every 4 hours PRN Gas  witch hazel Pads 1 Application(s) Topical every 4 hours PRN Perineal discomfort      ASSESSMENT:      31y      G  7  P  5025      PP Day #  2       Delivery:   [X  ]             [   ]     Hx. GHTN, HELLP - wnl ()  On Labetalol 200 BID    Assisted delivery  :    Vacuum   [  ]   Forceps)  [  ]    Indication for assisted delivery: Tracing Abn [  ]     Maternal Exhaustion [  ]     Other [  ]    Past medical history significant for HPI:  R2 H&P    Pt is a 30 y/o  at 37w1d admitted for scheduled induction of labor.  Patient reports occasional contractions. She denies vaginal bleeding, LOF, FM felt.   Prenatal course complicated by cHTN and had symptomatic COVID s/p admission with Rendesivir and Dexamethasone -  GBS negative  EFW 3200     OBHx:  - 2007:  7lb c/b PEC  - 10/27/2010:  7lb   - 2017:  10 lb   - 2019:  7lb c/b PEC  - sAB x 1, eTOP x 1 w/ D&C  GynHx: denies any ovarian cysts, fibroids, abnl pap smears  PMHx: cHTN, asthma never been intubated or hospitalized  PSHx: abdominoplasty , D&C  Med: Labetalol 200 BID, albuterol PRN, Tylenol, PNV  All: NKDA  Social: Denies any alcohol, tobacco, illicit substance use  Psych: denies any anxiety or depression        (2021 01:53)      Current Issues:     Breasts: Soft [x  ]    Engorged [  ]  Nipples:  Abdomen: Soft [ x ]  Nontender [  ]  Nondistended [  ]   Distended [  ]    Vagina: Lochia   Mod [x  ]      Scant [  ]  Heavy  [  ]    Perineum:  Intact [  ]   Laceration   [ X ]   Type of laceration [   1st degree    ]    Episiotomy [  ]    Type of episiotomy  [              ]    Lower extremities: Edema  [  ] noted bilaterally .  Nontender Angel  [  ]  Negative Leslie's sign [  ] Positive pedal pulses [  ]    Other relevant physical exam findings;      PLAN:    Plan: Increase ambulation, analgesia PRN and pain medication  protocol standing oxycodone, ibuprofen and acetaminophen.    Diet: Continue regular diet    Labs:    Continue routine postpartum care.  C/O Neck pain and headache, X 2 days, (not positional),   relieved by Tylenol and Motrin only for a couple of hours and then it comes back. Has not taken any Oxycodone. Will do so now to see if it helps. Denies Blurred vision, epigastric pain or any other discomfort.  The patient had a lenghty conversation with Dr. Reed late this afternoon, however, she neglected to mention any of this to him.  At the time, she was more focused on her discharge, which was cancelled due to elevated B/P's. Discussed with Dr. Reed.  We will repeat her HELLP labs, and get a CT of the head.     Discharge Planning [x  ]    For  Discharge Today  [   ]    Consults :  Social Work [  ]     Lactation [x  ]    Other [      ]
Discharge cancelled per Dr. Reed, due to elevated B/P's.  To monitor B/P's overnight  Continue routine Postpartum Care
Patient is three days S/P vaginal delivery with epidural analgesia.  She is 3 days S/P vaginal delivery with epidural analgesia.  Has been treated throughout the pregnancy for chronic hypertension with Labetalol.  Now complaining of a moderate non-postural headache with neck stiffness and pain. She has significant improvement with analgesics. There is no diplopia or tinnitus.  At present she is sitting up with only moderate discomfort.  Last BP-144/84    P-88  Possible postdural puncture headache.  Treatment for now; PO fluids, caffeine, bed rest, and abdominal binder.    Will follow tomorrow.      
Patient seen  No headache since this am  DC home  Cont labetolol  Call with s/s preeclampsia  Return with BP >150/100  M [facility]
Postop Day  4  s/p   epidural with headache on POD 3.    THERAPY:  Conservative measures: oral hydration, oral analgesics, and oral caffeine.    Sedation Score:	  [ X] Alert	    [  ] Drowsy        [  ] Arousable	[  ] Asleep	[  ] Unresponsive    Side Effects:	  [ X] None	     [  ] Nausea        [  ] Pruritus        [  ] Weakness   [  ] Numbness        ASSESSMENT/ PLAN   Patient sitting up comfortably with a pleasant affect.  Pain improved with conservative therapy.  Headache is mild with no exacerbation in the upright position.  Patient desires to continue conservative measures.    
S: Patient doing well. Complains of headache. Minimal lochia. Pain controlled.    O: Vital Signs Last 24 Hrs  T(C): 36.7 (03 Dec 2021 17:58), Max: 37.1 (02 Dec 2021 21:36)  T(F): 98.1 (03 Dec 2021 17:58), Max: 98.8 (03 Dec 2021 09:00)  HR: 88 (03 Dec 2021 17:58) (79 - 94)  BP: 144/81 (03 Dec 2021 17:58) (136/82 - 144/100)  BP(mean): --  RR: 18 (03 Dec 2021 17:58) (16 - 19)  SpO2: 100% (03 Dec 2021 17:58) (99% - 100%)    Gen: NAD  Abd: soft, Nontender, Nondistended, fundus firm  Ext: no tendern, mild edema    Labs:                        9.3    x     )-----------( x        ( 02 Dec 2021 05:50 )             27.7       A: 31y PPD#2 s/p  doing well.  Had headache and BP was elevated,   Headache is postural and will have anesthesia consult. If not deemed spinal headache will get head imaging.  Observe BP in hospital and repeat set of labs  ordered    Plan:  Routine postpartum care  Encouraged out of bed  Regular diet
SUBJECTIVE:    Pain: Controlled    Complaints: C/O Headache.  Laying flat in the bed.    MILESTONES:     Alert and oriented x 3  [x  ]  Out of bed /ambulating [  x]    Voiding [x  ]  Due to void [  ]    Tolerating a regular diet.    Infant feeding:   Breast [ X ]   Bottle [  ]     Both [ X ]                         Feeding related issues or concerns:    Objective   T(C): 36.7 (21 @ 10:00), Max: 36.8 (21 @ 14:36)  HR: 87 (21 @ 10:00) (83 - 88)  BP: 131/68 (21 @ 10:00) (124/83 - 144/100)  RR: 18 (21 @ 10:00) (16 - 18)  SpO2: 99% (21 @ 10:00) (99% - 100%)  Wt(kg): --                        9.8    8.37  )-----------( 234      ( 03 Dec 2021 20:41 )             30.6         Blood Type: O Positive    RPR: Negative          MEDICATIONS  (STANDING):  acetaminophen     Tablet .. 975 milliGRAM(s) Oral <User Schedule>  diphtheria/tetanus/pertussis (acellular) Vaccine (ADAcel) 0.5 milliLiter(s) IntraMuscular once  ibuprofen  Tablet. 600 milliGRAM(s) Oral every 6 hours  labetalol 200 milliGRAM(s) Oral two times a day  oxytocin Infusion 333.333 milliUNIT(s)/Min (1000 mL/Hr) IV Continuous <Continuous>  oxytocin Infusion 333.333 milliUNIT(s)/Min (1000 mL/Hr) IV Continuous <Continuous>  prenatal multivitamin 1 Tablet(s) Oral daily  senna 2 Tablet(s) Oral at bedtime  sodium chloride 0.9% lock flush 3 milliLiter(s) IV Push every 8 hours    MEDICATIONS  (PRN):  ALBUTerol    90 MICROgram(s) HFA Inhaler 2 Puff(s) Inhalation every 6 hours PRN Shortness of Breath and/or Wheezing  benzocaine 20%/menthol 0.5% Spray 1 Spray(s) Topical every 6 hours PRN for Perineal discomfort  dibucaine 1% Ointment 1 Application(s) Topical every 6 hours PRN Perineal discomfort  diphenhydrAMINE 25 milliGRAM(s) Oral every 6 hours PRN Pruritus  hydrocortisone 1% Cream 1 Application(s) Topical every 6 hours PRN Moderate Pain (4-6)  lanolin Ointment 1 Application(s) Topical every 6 hours PRN nipple soreness  magnesium hydroxide Suspension 30 milliLiter(s) Oral two times a day PRN Constipation  oxyCODONE    IR 5 milliGRAM(s) Oral every 3 hours PRN Moderate to Severe Pain (4-10)  oxyCODONE    IR 5 milliGRAM(s) Oral once PRN Moderate to Severe Pain (4-10)  pramoxine 1%/zinc 5% Cream 1 Application(s) Topical every 4 hours PRN Moderate Pain (4-6)  simethicone 80 milliGRAM(s) Chew every 4 hours PRN Gas  witch hazel Pads 1 Application(s) Topical every 4 hours PRN Perineal discomfort      ASSESSMENT:      31y      G  7      P 5025       PP Day # 3      Delivery:   [ X ]             [   ]              Hx. GHTN, HELLP - wnl ( and 12/3)  On Labetalol 200 BID    Assisted delivery  :    Vacuum   [  ]   Forceps)  [  ]    Indication for assisted delivery: Tracing Abn [  ]     Maternal Exhaustion [  ]     Other [  ]    Past medical history significant for HPI:  R2 H&P    Pt is a 32 y/o  at 37w1d admitted for scheduled induction of labor.  Patient reports occasional contractions. She denies vaginal bleeding, LOF, FM felt.   Prenatal course complicated by cHTN and had symptomatic COVID s/p admission with Rendesivir and Dexamethasone -  GBS negative  EFW 3200     OBHx:  - 2007:  7lb c/b PEC  - 10/27/2010:  7lb   - 2017:  10 lb   - 2019:  7lb c/b PEC  - sAB x 1, eTOP x 1 w/ D&C  GynHx: denies any ovarian cysts, fibroids, abnl pap smears  PMHx: cHTN, asthma never been intubated or hospitalized  PSHx: abdominoplasty , D&C  Med: Labetalol 200 BID, albuterol PRN, Tylenol, PNV  All: NKDA  Social: Denies any alcohol, tobacco, illicit substance use  Psych: denies any anxiety or depression        (2021 01:53)      Current Issues:     Breasts: Soft [x  ]    Engorged [  ]  Nipples:  Abdomen: Soft [ x ]  Nontender [  ]  Nondistended [  ]   Distended [  ]    Vagina: Lochia   Mod [x  ]      Scant [  ]  Heavy  [  ]    Perineum:  Intact [  ]   Laceration   [ X ]   Type of laceration [   1st degree   ]    Episiotomy [  ]    Type of episiotomy  [              ]    Lower extremities: Edema  [  ] noted bilaterally .  Nontender Angel  [  ]  Negative Leslie's sign [  ] Positive pedal pulses [  ]    Other relevant physical exam findings;      PLAN:    Plan: Increase ambulation, analgesia PRN and pain medication  protocol standing oxycodone, ibuprofen and acetaminophen.    Diet: Continue regular diet    Labs:    Continue routine postpartum care.  Patient has a Spinal Headache and  is considering the Blood Patch offered by Anesth, though, she does have some questions.   Await Anesth follow up today.    Discharge Planning [x  ]    For  Discharge Today  [ X  ]    Consults :  Social Work [  ]     Lactation [x  ]    Other [      ]
This patient was asleep during rounds this morning.  VSS, Afebrile  Will check back later  Continue routine Postpartum Care
32yo  at 37w1d for IOL for cHTN. Called to patient bedside as patient was complaining of rectal pressure and increased pain with contractions. Patient requesting epidural.     Vital Signs Last 24 Hrs  T(C): 36.7 (2021 05:30), Max: 37.1 (2021 01:08)  T(F): 98.06 (2021 05:30), Max: 98.8 (2021 02:15)  HR: 82 (2021 14:23) (70 - 98)  BP: 146/- (2021 14:08) (128/68 - 171/90)  BP(mean): --  RR: 17 (2021 05:30) (17 - 18)  SpO2: 98% (2021 09:38) (91% - 100%)    Gen: appears uncomfortable   Pulm: nonlabored breathing   CV: RRR     SVE: 4/long/-3   Cervical balloon removed

## 2021-12-04 NOTE — PROVIDER CONTACT NOTE (OTHER) - ASSESSMENT
Patient denies headache, epigastric pain, or visual disturbances.
Pt c/o headache which does not improve with change of position. No c/o light headedness or dizziness, no blurry vision, or epigastric pain. Ambulating oob to bathroom well.
Asymptomatic denies headache, dizziness, or lightheadedness. BP repeated manually and labetalol given as ordered.
Patient denies epigastric pain or visual disturbances.

## 2021-12-04 NOTE — CHART NOTE - NSCHARTNOTEFT_GEN_A_CORE
Note delayed 2/2 to clinical duties    S: Patient examined at bedside 2/2 to persistent headache since delivery. Patient had previously been evaluated by anesthesia who did not think the headache was post-spinal. Patient had previously seen by the NP who recommended a CT. Patient states that the headache originated in her neck and radiated up to her head. Denies any nausea or vomiting with the headaches. States the headache does not get worse with positional changes. However, at the time of examination she stated that the headaches had since resolved. She states she no longer has neck pain either. She states that the headache and neck pain resolved this morning. States that she has a history of migraines and this headache is similar to those in the past. Endorses that dimming the room lights and has helped alleviate the headache. Patient endorses wanting to go home as she no longer has any symptoms.     O:  Vital Signs Last 24 Hrs  T(C): 36.8 (04 Dec 2021 18:08), Max: 36.8 (04 Dec 2021 13:45)  T(F): 98.3 (04 Dec 2021 18:08), Max: 98.3 (04 Dec 2021 13:45)  HR: 84 (04 Dec 2021 18:08) (73 - 87)  BP: 130/86 (04 Dec 2021 18:08) (124/83 - 145/95)  BP(mean): --  RR: 17 (04 Dec 2021 18:08) (16 - 19)  SpO2: 99% (04 Dec 2021 18:08) (99% - 100%)    A/P: patient examined at bedside 2/2 to persistent headache and neck pain since delivery. On examination patient reports that her headache and neck pain have resolved since this morning.  -STAT HELLP labs  -Monitor BP   -If STAT HELLP labs stable and BP stable, patient can go home  -Since patient feeling better, CT head that had previously been ordered by the NP not done    Discussed w Dr. Genesis Dotson, PGY-1

## 2021-12-04 NOTE — PROVIDER CONTACT NOTE (OTHER) - BACKGROUND
NSD 12/1 7:28, Hx of chronic hypertension
Patient s/p vaginal delivery on 12/1/21, hs of GHTN per provider H and P. Patient on Labetalol 200mg BID.
QBL: 215cc H/H: 9.3/27.7  Hx: Chronic HTN, asthma - on albuerol, covid + in Nov.
Patient s/p vaginal delivery on 12/1/21

## 2021-12-04 NOTE — PROVIDER CONTACT NOTE (OTHER) - SITUATION
Patient complains of  headache rates 8/10, complains of neck stiffness and pain
ANNALISE 12/1 at 0728. 37.2 wks.
Patient passed an orange size clot,  and patient had an episode of elevated BP.
Patient /100, HR 88

## 2021-12-05 ENCOUNTER — NON-APPOINTMENT (OUTPATIENT)
Age: 31
End: 2021-12-05

## 2021-12-05 DIAGNOSIS — U07.1 COVID-19: ICD-10-CM

## 2021-12-07 ENCOUNTER — APPOINTMENT (OUTPATIENT)
Dept: OBGYN | Facility: CLINIC | Age: 31
End: 2021-12-07
Payer: COMMERCIAL

## 2021-12-07 ENCOUNTER — NON-APPOINTMENT (OUTPATIENT)
Age: 31
End: 2021-12-07

## 2021-12-07 PROCEDURE — 99211 OFF/OP EST MAY X REQ PHY/QHP: CPT

## 2021-12-13 ENCOUNTER — NON-APPOINTMENT (OUTPATIENT)
Age: 31
End: 2021-12-13

## 2021-12-13 ENCOUNTER — APPOINTMENT (OUTPATIENT)
Dept: ENDOCRINOLOGY | Facility: CLINIC | Age: 31
End: 2021-12-13
Payer: COMMERCIAL

## 2021-12-13 ENCOUNTER — APPOINTMENT (OUTPATIENT)
Dept: OBGYN | Facility: CLINIC | Age: 31
End: 2021-12-13

## 2021-12-13 VITALS
SYSTOLIC BLOOD PRESSURE: 118 MMHG | OXYGEN SATURATION: 99 % | DIASTOLIC BLOOD PRESSURE: 88 MMHG | HEIGHT: 63 IN | WEIGHT: 190 LBS | BODY MASS INDEX: 33.66 KG/M2 | TEMPERATURE: 97.1 F | HEART RATE: 83 BPM

## 2021-12-13 PROCEDURE — 99214 OFFICE O/P EST MOD 30 MIN: CPT

## 2021-12-13 NOTE — REVIEW OF SYSTEMS
[Fatigue] : no fatigue [Decreased Appetite] : appetite not decreased [Recent Weight Gain (___ Lbs)] : no recent weight gain [Recent Weight Loss (___ Lbs)] : no recent weight loss [Visual Field Defect] : no visual field defect [Dry Eyes] : no dryness [Dysphagia] : no dysphagia [Neck Pain] : no neck pain [Dysphonia] : no dysphonia [Nasal Congestion] : no nasal congestion [Chest Pain] : no chest pain [Slow Heart Rate] : heart rate is not slow [Palpitations] : no palpitations [Fast Heart Rate] : heart rate is not fast [Shortness Of Breath] : no shortness of breath [Cough] : no cough [Nausea] : no nausea [Constipation] : no constipation [Vomiting] : no vomiting [Diarrhea] : no diarrhea [Polyuria] : no polyuria [Irregular Menses] : regular menses [Joint Pain] : no joint pain [Muscle Weakness] : no muscle weakness [Acanthosis] : no acanthosis  [Acne] : no acne [Headaches] : no headaches [Dizziness] : no dizziness [Tremors] : no tremors [Pain/Numbness of Digits] : no pain/numbness of digits [Polydipsia] : no polydipsia [Cold Intolerance] : no cold intolerance [Easy Bleeding] : no ~M tendency for easy bleeding [Easy Bruising] : no tendency for easy bruising

## 2021-12-13 NOTE — PHYSICAL EXAM
[Well Nourished] : well nourished [No Acute Distress] : no acute distress [Normal Sclera/Conjunctiva] : normal sclera/conjunctiva [EOMI] : extra ocular movement intact [PERRL] : pupils equal, round and reactive to light [Normal Outer Ear/Nose] : the ears and nose were normal in appearance [Normal Hearing] : hearing was normal [No Neck Mass] : no neck mass was observed [Thyroid Not Enlarged] : the thyroid was not enlarged [No Respiratory Distress] : no respiratory distress [Clear to Auscultation] : lungs were clear to auscultation bilaterally [Normal S1, S2] : normal S1 and S2 [Normal Rate] : heart rate was normal [Regular Rhythm] : with a regular rhythm [Normal Bowel Sounds] : normal bowel sounds [Not Tender] : non-tender [Soft] : abdomen soft [Normal Gait] : normal gait [No Clubbing, Cyanosis] : no clubbing  or cyanosis of the fingernails [No Joint Swelling] : no joint swelling seen [Normal Strength/Tone] : muscle strength and tone were normal [No Rash] : no rash [No Skin Lesions] : no skin lesions [No Motor Deficits] : the motor exam was normal [Normal Reflexes] : deep tendon reflexes were 2+ and symmetric [No Tremors] : no tremors [Oriented x3] : oriented to person, place, and time [Normal Affect] : the affect was normal [Normal Insight/Judgement] : insight and judgment were intact [Normal Mood] : the mood was normal
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
## 2021-12-13 NOTE — ASSESSMENT
[FreeTextEntry1] : 31 year old female with history of subclinical hyperthyroidism ( likely 2/2 ab negative Graves disease) and thyroid nodules s/p delivery 2 weeks prior \par \par \par -Will check baseline TFTs\par -Symptomatically euthyroid \par -Will check official ultrasound post pregnancy on next visit \par -Check TFTs again in 3 months \par -Follow up in 3 months. \par

## 2021-12-14 ENCOUNTER — APPOINTMENT (OUTPATIENT)
Dept: PULMONOLOGY | Facility: CLINIC | Age: 31
End: 2021-12-14
Payer: COMMERCIAL

## 2021-12-14 ENCOUNTER — TRANSCRIPTION ENCOUNTER (OUTPATIENT)
Age: 31
End: 2021-12-14

## 2021-12-14 VITALS
HEART RATE: 92 BPM | OXYGEN SATURATION: 97 % | DIASTOLIC BLOOD PRESSURE: 92 MMHG | TEMPERATURE: 98.5 F | SYSTOLIC BLOOD PRESSURE: 136 MMHG

## 2021-12-14 DIAGNOSIS — Z01.812 ENCOUNTER FOR PREPROCEDURAL LABORATORY EXAMINATION: ICD-10-CM

## 2021-12-14 DIAGNOSIS — Z20.822 ENCOUNTER FOR PREPROCEDURAL LABORATORY EXAMINATION: ICD-10-CM

## 2021-12-14 DIAGNOSIS — J45.909 UNSPECIFIED ASTHMA, UNCOMPLICATED: ICD-10-CM

## 2021-12-14 LAB
T3 SERPL-MCNC: 117 NG/DL
T4 FREE SERPL-MCNC: 1 NG/DL
TSH SERPL-ACNC: 0.26 UIU/ML

## 2021-12-14 PROCEDURE — 99204 OFFICE O/P NEW MOD 45 MIN: CPT

## 2021-12-14 RX ORDER — SOFT LENS DISINFECTANT
SOLUTION, NON-ORAL MISCELLANEOUS
Qty: 1 | Refills: 0 | Status: ACTIVE | COMMUNITY
Start: 2021-12-14 | End: 1900-01-01

## 2021-12-14 RX ORDER — ALBUTEROL SULFATE 2.5 MG/3ML
(2.5 MG/3ML) SOLUTION RESPIRATORY (INHALATION) 4 TIMES DAILY
Qty: 1 | Refills: 5 | Status: ACTIVE | COMMUNITY
Start: 2021-12-14 | End: 1900-01-01

## 2021-12-14 RX ORDER — NEBULIZER ACCESSORIES
KIT MISCELLANEOUS
Qty: 1 | Refills: 0 | Status: ACTIVE | COMMUNITY
Start: 2021-12-14 | End: 1900-01-01

## 2021-12-14 NOTE — HISTORY OF PRESENT ILLNESS
[TextBox_4] : 31F \par \par previously diagnosed with mild asthma\par just gave birth, this was her 5th pregnancy\par noticed a lot of wheezing at night\par albuterol would help\par was ok during the daytime\par works as RT\par now breathing is much better

## 2021-12-15 ENCOUNTER — NON-APPOINTMENT (OUTPATIENT)
Age: 31
End: 2021-12-15

## 2021-12-15 ENCOUNTER — APPOINTMENT (OUTPATIENT)
Dept: OBGYN | Facility: CLINIC | Age: 31
End: 2021-12-15
Payer: COMMERCIAL

## 2021-12-15 PROCEDURE — 99211 OFF/OP EST MAY X REQ PHY/QHP: CPT

## 2021-12-16 LAB — TSH RECEPTOR AB: <1.1 IU/L

## 2021-12-20 ENCOUNTER — NON-APPOINTMENT (OUTPATIENT)
Age: 31
End: 2021-12-20

## 2021-12-21 RX ORDER — DOXYLAMINE SUCCINATE AND PYRIDOXINE HYDROCHLORIDE 10; 10 MG/1; MG/1
10-10 TABLET, DELAYED RELEASE ORAL
Qty: 30 | Refills: 2 | Status: DISCONTINUED | COMMUNITY
Start: 2021-05-24 | End: 2021-12-21

## 2021-12-21 RX ORDER — ALBUTEROL 90 MCG
90 AEROSOL (GRAM) INHALATION
Refills: 0 | Status: ACTIVE | COMMUNITY

## 2021-12-26 ENCOUNTER — NON-APPOINTMENT (OUTPATIENT)
Age: 31
End: 2021-12-26

## 2021-12-28 ENCOUNTER — APPOINTMENT (OUTPATIENT)
Dept: CARDIOLOGY | Facility: CLINIC | Age: 31
End: 2021-12-28
Payer: COMMERCIAL

## 2021-12-28 ENCOUNTER — NON-APPOINTMENT (OUTPATIENT)
Age: 31
End: 2021-12-28

## 2021-12-28 VITALS
RESPIRATION RATE: 16 BRPM | WEIGHT: 190 LBS | HEART RATE: 71 BPM | DIASTOLIC BLOOD PRESSURE: 89 MMHG | OXYGEN SATURATION: 98 % | BODY MASS INDEX: 33.66 KG/M2 | TEMPERATURE: 98.2 F | SYSTOLIC BLOOD PRESSURE: 137 MMHG | HEIGHT: 63 IN

## 2021-12-28 PROCEDURE — 99203 OFFICE O/P NEW LOW 30 MIN: CPT

## 2021-12-28 PROCEDURE — 93000 ELECTROCARDIOGRAM COMPLETE: CPT

## 2021-12-28 NOTE — HISTORY OF PRESENT ILLNESS
[FreeTextEntry1] : 31 year old woman  delivered 21 at 37 weeks. \par Was struggling with BP during pregnancy\par Hospitalized for a week and given Magnesium\par Was sent Procardia 30 mg daily and Labetalol 200 mg BID\par \par Did not like Procardia -stopped on her own\par \par Currently on Labetalol 200 mg BID\par \par EKG normalized

## 2021-12-28 NOTE — DISCUSSION/SUMMARY
[FreeTextEntry1] : 31 year old woman  who delivered  at 33 weeks. Course complicated by severe PEC\par Currently on Labetalol 200 mg BID\par Does not like taking medications\par She does get headache and tingling- will check BP and take extra labetalol\par FU in 3 weeks

## 2022-01-01 NOTE — ED PROVIDER NOTE - PSYCHIATRIC, MLM
Alert and oriented to person, place, time/situation. normal mood and affect. no apparent risk to self or others. 3.5

## 2022-01-03 ENCOUNTER — NON-APPOINTMENT (OUTPATIENT)
Age: 32
End: 2022-01-03

## 2022-01-05 NOTE — PATIENT PROFILE OB - NS PRO AD PATIENT TYPE
Caller would like to discuss an/a Referral for Orthopaedics for R shoulder pain & ganglion cyst on the left hand. The referral itself  on 2021.  Writer advised caller of callback within 24-72 hours.    Patient Name: Gregory Negron  Caller Name: Tracy   Name of Facility: [facility]   Callback Number: [phone]  Best Availability: any  Can A Detailed Message Be left? yes  Fax Number: na  Additional Info: na  Did you confirm the message with the caller?: yes    Thank you,  Tonya Sampson   Health Care Proxy (HCP)

## 2022-01-07 ENCOUNTER — APPOINTMENT (OUTPATIENT)
Dept: OBGYN | Facility: CLINIC | Age: 32
End: 2022-01-07

## 2022-01-18 ENCOUNTER — APPOINTMENT (OUTPATIENT)
Dept: CARDIOLOGY | Facility: CLINIC | Age: 32
End: 2022-01-18

## 2022-01-19 ENCOUNTER — RX RENEWAL (OUTPATIENT)
Age: 32
End: 2022-01-19

## 2022-01-25 ENCOUNTER — EMERGENCY (EMERGENCY)
Facility: HOSPITAL | Age: 32
LOS: 1 days | Discharge: HOME | End: 2022-01-25
Attending: EMERGENCY MEDICINE | Admitting: EMERGENCY MEDICINE
Payer: COMMERCIAL

## 2022-01-25 VITALS
OXYGEN SATURATION: 100 % | RESPIRATION RATE: 16 BRPM | TEMPERATURE: 98 F | SYSTOLIC BLOOD PRESSURE: 138 MMHG | HEART RATE: 73 BPM | DIASTOLIC BLOOD PRESSURE: 85 MMHG

## 2022-01-25 VITALS
SYSTOLIC BLOOD PRESSURE: 170 MMHG | HEART RATE: 64 BPM | TEMPERATURE: 98 F | OXYGEN SATURATION: 100 % | DIASTOLIC BLOOD PRESSURE: 104 MMHG | RESPIRATION RATE: 16 BRPM

## 2022-01-25 DIAGNOSIS — Z98.890 OTHER SPECIFIED POSTPROCEDURAL STATES: Chronic | ICD-10-CM

## 2022-01-25 DIAGNOSIS — Z98.89 OTHER SPECIFIED POSTPROCEDURAL STATES: Chronic | ICD-10-CM

## 2022-01-25 LAB
ALBUMIN SERPL ELPH-MCNC: 3.9 G/DL — SIGNIFICANT CHANGE UP (ref 3.3–5)
ALP SERPL-CCNC: 101 U/L — SIGNIFICANT CHANGE UP (ref 40–120)
ALT FLD-CCNC: 35 U/L — SIGNIFICANT CHANGE UP (ref 12–78)
ANION GAP SERPL CALC-SCNC: 5 MMOL/L — SIGNIFICANT CHANGE UP (ref 5–17)
AST SERPL-CCNC: 29 U/L — SIGNIFICANT CHANGE UP (ref 15–37)
BASOPHILS # BLD AUTO: 0.12 K/UL — SIGNIFICANT CHANGE UP (ref 0–0.2)
BASOPHILS NFR BLD AUTO: 2.2 % — HIGH (ref 0–2)
BILIRUB SERPL-MCNC: 0.1 MG/DL — LOW (ref 0.2–1.2)
BUN SERPL-MCNC: 20 MG/DL — SIGNIFICANT CHANGE UP (ref 7–23)
CALCIUM SERPL-MCNC: 9.3 MG/DL — SIGNIFICANT CHANGE UP (ref 8.5–10.1)
CHLORIDE SERPL-SCNC: 107 MMOL/L — SIGNIFICANT CHANGE UP (ref 96–108)
CO2 SERPL-SCNC: 28 MMOL/L — SIGNIFICANT CHANGE UP (ref 22–31)
CREAT SERPL-MCNC: 0.77 MG/DL — SIGNIFICANT CHANGE UP (ref 0.5–1.3)
D DIMER BLD IA.RAPID-MCNC: <150 NG/ML DDU — SIGNIFICANT CHANGE UP
EOSINOPHIL # BLD AUTO: 0.35 K/UL — SIGNIFICANT CHANGE UP (ref 0–0.5)
EOSINOPHIL NFR BLD AUTO: 6.4 % — HIGH (ref 0–6)
GLUCOSE SERPL-MCNC: 98 MG/DL — SIGNIFICANT CHANGE UP (ref 70–99)
HCG SERPL-ACNC: <1 MIU/ML — SIGNIFICANT CHANGE UP
HCT VFR BLD CALC: 35.9 % — SIGNIFICANT CHANGE UP (ref 34.5–45)
HGB BLD-MCNC: 12 G/DL — SIGNIFICANT CHANGE UP (ref 11.5–15.5)
IMM GRANULOCYTES NFR BLD AUTO: 0.2 % — SIGNIFICANT CHANGE UP (ref 0–1.5)
LYMPHOCYTES # BLD AUTO: 2.77 K/UL — SIGNIFICANT CHANGE UP (ref 1–3.3)
LYMPHOCYTES # BLD AUTO: 50.5 % — HIGH (ref 13–44)
MCHC RBC-ENTMCNC: 28.6 PG — SIGNIFICANT CHANGE UP (ref 27–34)
MCHC RBC-ENTMCNC: 33.4 GM/DL — SIGNIFICANT CHANGE UP (ref 32–36)
MCV RBC AUTO: 85.7 FL — SIGNIFICANT CHANGE UP (ref 80–100)
MONOCYTES # BLD AUTO: 0.52 K/UL — SIGNIFICANT CHANGE UP (ref 0–0.9)
MONOCYTES NFR BLD AUTO: 9.5 % — SIGNIFICANT CHANGE UP (ref 2–14)
NEUTROPHILS # BLD AUTO: 1.72 K/UL — LOW (ref 1.8–7.4)
NEUTROPHILS NFR BLD AUTO: 31.2 % — LOW (ref 43–77)
NRBC # BLD: 0 /100 WBCS — SIGNIFICANT CHANGE UP (ref 0–0)
PLATELET # BLD AUTO: 200 K/UL — SIGNIFICANT CHANGE UP (ref 150–400)
POTASSIUM SERPL-MCNC: 3.9 MMOL/L — SIGNIFICANT CHANGE UP (ref 3.5–5.3)
POTASSIUM SERPL-SCNC: 3.9 MMOL/L — SIGNIFICANT CHANGE UP (ref 3.5–5.3)
PROT SERPL-MCNC: 8.1 G/DL — SIGNIFICANT CHANGE UP (ref 6–8.3)
RBC # BLD: 4.19 M/UL — SIGNIFICANT CHANGE UP (ref 3.8–5.2)
RBC # FLD: 13.9 % — SIGNIFICANT CHANGE UP (ref 10.3–14.5)
SARS-COV-2 RNA SPEC QL NAA+PROBE: SIGNIFICANT CHANGE UP
SODIUM SERPL-SCNC: 140 MMOL/L — SIGNIFICANT CHANGE UP (ref 135–145)
TROPONIN I, HIGH SENSITIVITY RESULT: 5.3 NG/L — SIGNIFICANT CHANGE UP
TSH SERPL-MCNC: 0.52 UIU/ML — SIGNIFICANT CHANGE UP (ref 0.36–3.74)
WBC # BLD: 5.49 K/UL — SIGNIFICANT CHANGE UP (ref 3.8–10.5)
WBC # FLD AUTO: 5.49 K/UL — SIGNIFICANT CHANGE UP (ref 3.8–10.5)

## 2022-01-25 PROCEDURE — 99285 EMERGENCY DEPT VISIT HI MDM: CPT | Mod: 25

## 2022-01-25 PROCEDURE — 93005 ELECTROCARDIOGRAM TRACING: CPT

## 2022-01-25 PROCEDURE — 84702 CHORIONIC GONADOTROPIN TEST: CPT

## 2022-01-25 PROCEDURE — 71045 X-RAY EXAM CHEST 1 VIEW: CPT | Mod: 26

## 2022-01-25 PROCEDURE — 84443 ASSAY THYROID STIM HORMONE: CPT

## 2022-01-25 PROCEDURE — 85379 FIBRIN DEGRADATION QUANT: CPT

## 2022-01-25 PROCEDURE — 96368 THER/DIAG CONCURRENT INF: CPT

## 2022-01-25 PROCEDURE — 93010 ELECTROCARDIOGRAM REPORT: CPT

## 2022-01-25 PROCEDURE — 96375 TX/PRO/DX INJ NEW DRUG ADDON: CPT

## 2022-01-25 PROCEDURE — 87635 SARS-COV-2 COVID-19 AMP PRB: CPT

## 2022-01-25 PROCEDURE — 71045 X-RAY EXAM CHEST 1 VIEW: CPT

## 2022-01-25 PROCEDURE — 70450 CT HEAD/BRAIN W/O DYE: CPT | Mod: 26,MA

## 2022-01-25 PROCEDURE — 96365 THER/PROPH/DIAG IV INF INIT: CPT

## 2022-01-25 PROCEDURE — 99284 EMERGENCY DEPT VISIT MOD MDM: CPT

## 2022-01-25 PROCEDURE — 84484 ASSAY OF TROPONIN QUANT: CPT

## 2022-01-25 PROCEDURE — 36415 COLL VENOUS BLD VENIPUNCTURE: CPT

## 2022-01-25 PROCEDURE — 85025 COMPLETE CBC W/AUTO DIFF WBC: CPT

## 2022-01-25 PROCEDURE — 70450 CT HEAD/BRAIN W/O DYE: CPT | Mod: MA

## 2022-01-25 PROCEDURE — 80053 COMPREHEN METABOLIC PANEL: CPT

## 2022-01-25 RX ORDER — HYDRALAZINE HCL 50 MG
5 TABLET ORAL ONCE
Refills: 0 | Status: DISCONTINUED | OUTPATIENT
Start: 2022-01-25 | End: 2022-01-25

## 2022-01-25 RX ORDER — HYDRALAZINE HCL 50 MG
1 TABLET ORAL
Qty: 90 | Refills: 0
Start: 2022-01-25 | End: 2022-02-23

## 2022-01-25 RX ORDER — ACETAMINOPHEN 500 MG
1000 TABLET ORAL ONCE
Refills: 0 | Status: COMPLETED | OUTPATIENT
Start: 2022-01-25 | End: 2022-01-25

## 2022-01-25 RX ORDER — METOCLOPRAMIDE HCL 10 MG
10 TABLET ORAL ONCE
Refills: 0 | Status: COMPLETED | OUTPATIENT
Start: 2022-01-25 | End: 2022-01-25

## 2022-01-25 RX ORDER — DIPHENHYDRAMINE HCL 50 MG
25 CAPSULE ORAL ONCE
Refills: 0 | Status: COMPLETED | OUTPATIENT
Start: 2022-01-25 | End: 2022-01-25

## 2022-01-25 RX ADMIN — Medication 104 MILLIGRAM(S): at 22:13

## 2022-01-25 RX ADMIN — Medication 1000 MILLIGRAM(S): at 22:12

## 2022-01-25 RX ADMIN — Medication 25 MILLIGRAM(S): at 21:53

## 2022-01-25 RX ADMIN — Medication 400 MILLIGRAM(S): at 21:53

## 2022-01-25 RX ADMIN — Medication 1000 MILLIGRAM(S): at 22:37

## 2022-01-25 RX ADMIN — Medication 10 MILLIGRAM(S): at 22:36

## 2022-01-25 NOTE — ED PROVIDER NOTE - NSFOLLOWUPINSTRUCTIONS_ED_ALL_ED_FT
Chronic Hypertension    WHAT YOU NEED TO KNOW:    Hypertension is high blood pressure. Your blood pressure is the force of your blood moving against the walls of your arteries. Hypertension causes your blood pressure to get so high that your heart has to work much harder than normal. This can damage your heart. Even if you have hypertension for years, lifestyle changes, medicines, or both can help bring your blood pressure to normal.    DISCHARGE INSTRUCTIONS:    Call your local emergency number (911 in the [place]) or have someone call if:   •You have chest pain.      •You have any of the following signs of a heart attack: ?Squeezing, pressure, or pain in your chest      ?You may also have any of the following: ?Discomfort or pain in your back, neck, jaw, stomach, or arm      ?Shortness of breath      ?Nausea or vomiting      ?Lightheadedness or a sudden cold sweat        •You become confused or have difficulty speaking.      •You suddenly feel lightheaded or have trouble breathing.      Seek care immediately if:   •You have a severe headache or vision loss.      •You have weakness in an arm or leg.      Call your doctor or cardiologist if:   •You feel faint, dizzy, confused, or drowsy.      •You have been taking your blood pressure medicine but your pressure is higher than your provider says it should be.      •You have questions or concerns about your condition or care.      Medicines: You may need any of the following:  •Antihypertensives may be used to help lower your blood pressure. Several kinds of medicines are available. Your healthcare provider may change the medicine or medicines you currently take. This may be needed if your blood pressure is often high when you check it at home or you are having other problems with blood pressure control.      •Diuretics help decrease extra fluid that collects in your body. This will help lower your BP. You may urinate more often while you take this medicine.      •Cholesterol medicine helps lower your cholesterol level. A low cholesterol level helps prevent heart disease and makes it easier to control your blood pressure.      •Take your medicine as directed. Contact your healthcare provider if you think your medicine is not helping or if you have side effects. Tell him or her if you are allergic to any medicine. Keep a list of the medicines, vitamins, and herbs you take. Include the amounts, and when and why you take them. Bring the list or the pill bottles to follow-up visits. Carry your medicine list with you in case of an emergency.      Stages of hypertension:     Blood Pressure Readings     •Normal blood pressure is 119/79 or lower. Your healthcare provider may only check your blood pressure each year if it stays at a normal level.      •Elevated blood pressure is 120/79 to 129/79. This is sometimes called prehypertension. Your healthcare provider may suggest lifestyle changes to help lower your blood pressure to a normal level. He or she may then check it again in 3 to 6 months.      •Stage 1 hypertension is 130/80 to 139/89. Your provider may recommend lifestyle changes, medication, and checks every 3 to 6 months until your blood pressure is controlled.      •Stage 2 hypertension is 140/90 or higher. Your provider will recommend lifestyle changes and have you take 2 kinds of hypertension medicines. You will also need to have your blood pressure checked monthly until it is controlled.      Manage chronic hypertension:   •Check your blood pressure at home. Avoid smoking, caffeine, and exercise at least 30 minutes before checking your blood pressure. Sit and rest for 5 minutes before you take your blood pressure. Extend your arm and support it on a flat surface. Your arm should be at the same level as your heart. Follow the directions that came with your blood pressure monitor. Check your blood pressure 2 times, 1 minute apart, before you take your medicine in the morning. Also check your blood pressure before your evening meal. Keep a record of your readings and bring it to your follow-up visits. Ask your healthcare provider what your blood pressure should be.  How to take a Blood Pressure           •Manage any other health conditions you have. Health conditions such as diabetes can increase your risk for hypertension. Follow your healthcare provider's instructions and take all your medicines as directed. Talk to your healthcare provider about any new health conditions you have recently developed.      •Ask about all medicines. Certain medicines can increase your blood pressure. Examples include oral birth control pills, decongestants, herbal supplements, and NSAIDs, such as ibuprofen. Your healthcare provider can tell you which medicines are safe for you to take. This includes prescription and over-the-counter medicines.      Lifestyle changes you can make to lower your blood pressure: Your provider may want you to make more lifestyle changes if you are having trouble controlling your blood pressure. This may feel difficult over time, especially if you think you are making good changes but your pressure is still high. It might help to focus on one new change at a time. For example, try to add 1 more day of exercise, or exercise for an extra 10 minutes on 2 days. Small changes can make a big difference. Your healthcare provider can also refer you to specialists such as a dietitian who can help you make small changes. Your family members may be included in helping you learn to create lifestyle changes, such as the following:    Ways to Lower Your Blood Pressure     •Limit sodium (salt) as directed. Too much sodium can affect your fluid balance. Check labels to find low-sodium or no-salt-added foods. Some low-sodium foods use potassium salts for flavor. Too much potassium can also cause health problems. Your healthcare provider will tell you how much sodium and potassium are safe for you to have in a day. He or she may recommend that you limit sodium to 2,300 mg a day.             •Follow the meal plan recommended by your healthcare provider. A dietitian or your provider can give you more information on low-sodium plans or the DASH (Dietary Approaches to Stop Hypertension) eating plan. The DASH plan is low in sodium, processed sugar, unhealthy fats, and total fat. It is high in potassium, calcium, and fiber. These can be found in vegetables, fruit, and whole-grain foods.             •Be physically active throughout the day. Physical activity, such as exercise, can help control your blood pressure and your weight. Be physically active for at least 30 minutes per day, on most days of the week. Include aerobic activity, such as walking or riding a bicycle. Also include strength training at least 2 times each week. Your healthcare providers can help you create a physical activity plan.  Black Family Walking for Exercise       Strength Training for Adults           •Decrease stress. This may help lower your blood pressure. Learn ways to relax, such as deep breathing or listening to music.      •Limit alcohol as directed. Alcohol can increase your blood pressure. A drink of alcohol is 12 ounces of beer, 5 ounces of wine, or 1½ ounces of liquor.      •Do not smoke. Nicotine and other chemicals in cigarettes and cigars can increase your blood pressure and also cause lung damage. Ask your healthcare provider for information if you currently smoke and need help to quit. E-cigarettes or smokeless tobacco still contain nicotine. Talk to your healthcare provider before you use these products.  Prevent Heart Disease            Follow up with your doctor or cardiologist as directed: You will need to return to have your blood pressure checked and to have other lab tests done. Write down your questions so you remember to ask them during your visits.

## 2022-01-25 NOTE — ED PROVIDER NOTE - GASTROINTESTINAL NEGATIVE STATEMENT, MLM
no abdominal pain, no bloating, no constipation, no diarrhea, no vomiting. +nausea earlier, resolved

## 2022-01-25 NOTE — ED ADULT TRIAGE NOTE - CHIEF COMPLAINT QUOTE
S/P Post partum a month. C/O Head ache, chest pain and high blood pressure for 4 days. Took BP medications today.

## 2022-01-25 NOTE — ED PROVIDER NOTE - OBJECTIVE STATEMENT
32 yo F PMHx Grave's disease (not currently on any treatment), HTN, 8 weeks postpartum (12/1/21) presents to ED c/o headache x today,  intermittent chest pain x weeks. Pt admits to feeling unwell since pregnancy. Reports HTN was exacerbated during pregnancy, did not have proteinuria. Pt admits to multiple antihypertensive medication adjustments as of late, most recently was 2 weeks ago- currently on labetalol and nifedipine. Pt states she has been compliant but both medications have made her feel sick- nifedipine makes her dizzy and labetalol makes her "head crawl" and she feels "foggy" -- pt states symptoms worsened today. reports BP systolic 170s. Pt reports generalized headache pressure, mostly left sided, pounding pain. Pt states chest pain has been generalized, intermittent for weeks but today constant, right sided radiating to her R shoulder, pleuritic in nature. Mild nausea earlier, resolved. Denies shortness of breath, back pain, numbness/tingling, focal weakness, visual changes, fever/chills.  Cardiologist- Dr. Mcneill

## 2022-01-25 NOTE — ED PROVIDER NOTE - CARE PROVIDER_API CALL
Julio C Edmondson)  [facility]  [address] [place] [address]  Phone: [phone]  Fax: [phone]  Follow Up Time:

## 2022-01-25 NOTE — ED ADULT NURSE NOTE - BEFAST SPEECH
Bleeding that does not stop/Swelling that gets worse/Fever greater than (need to indicate Fahrenheit or Celsius)/Wound/Surgical Site with redness, or foul smelling discharge or pus/Pain not relieved by Medications
Yes

## 2022-01-25 NOTE — ED PROVIDER NOTE - PATIENT PORTAL LINK FT
You can access the FollowMyHealth Patient Portal offered by [facility] by registering at the following website: [email or web address]. By joining [email or web address]’s FollowMyHealth portal, you will also be able to view your health information using other applications (apps) compatible with our system.

## 2022-01-25 NOTE — ED ADULT NURSE NOTE - OBJECTIVE STATEMENT
Patient came in to ED 8 weeks post partum c/o pounding headache today with elevated blood pressure. Also c/o intermittent chest pain for weeks/ no palpitations/ nausea/ vomiting/ fever.

## 2022-01-25 NOTE — ED PROVIDER NOTE - CLINICAL SUMMARY MEDICAL DECISION MAKING FREE TEXT BOX
30 yo F PMHx HTN poorly controlled, 8 weeks post partum p/w hypertensive urgency 170/104, with headache, pleuritic chest pain--> ro bleed ro ACS ro PE workup, BP control

## 2022-01-25 NOTE — ED PROVIDER NOTE - ATTENDING CONTRIBUTION TO CARE
32 y/o F with hx of chronic HTN with c/o elevated BP and HA x 3 days despite taking her BP meds.  pt is 2 mos postpartum and did not have preeclampsia or eclampsia while pregnant.    PE: unremarkable    cardiac workup, meds, reassess

## 2022-01-26 RX ORDER — HYDRALAZINE HCL 50 MG
1 TABLET ORAL
Qty: 90 | Refills: 0
Start: 2022-01-26 | End: 2022-02-24

## 2022-02-07 ENCOUNTER — RX RENEWAL (OUTPATIENT)
Age: 32
End: 2022-02-07

## 2022-02-08 ENCOUNTER — APPOINTMENT (OUTPATIENT)
Dept: CARDIOLOGY | Facility: CLINIC | Age: 32
End: 2022-02-08
Payer: COMMERCIAL

## 2022-02-08 VITALS
OXYGEN SATURATION: 99 % | WEIGHT: 190 LBS | TEMPERATURE: 98 F | HEIGHT: 63 IN | HEART RATE: 94 BPM | SYSTOLIC BLOOD PRESSURE: 133 MMHG | RESPIRATION RATE: 16 BRPM | BODY MASS INDEX: 33.66 KG/M2 | DIASTOLIC BLOOD PRESSURE: 91 MMHG

## 2022-02-08 PROBLEM — I10 ESSENTIAL (PRIMARY) HYPERTENSION: Chronic | Status: ACTIVE | Noted: 2022-01-25

## 2022-02-08 PROCEDURE — 99214 OFFICE O/P EST MOD 30 MIN: CPT

## 2022-02-18 ENCOUNTER — OUTPATIENT (OUTPATIENT)
Dept: OUTPATIENT SERVICES | Facility: HOSPITAL | Age: 32
LOS: 1 days | End: 2022-02-18

## 2022-02-18 ENCOUNTER — APPOINTMENT (OUTPATIENT)
Dept: CV DIAGNOSITCS | Facility: HOSPITAL | Age: 32
End: 2022-02-18
Payer: COMMERCIAL

## 2022-02-18 ENCOUNTER — APPOINTMENT (OUTPATIENT)
Dept: UROLOGY | Facility: CLINIC | Age: 32
End: 2022-02-18
Payer: COMMERCIAL

## 2022-02-18 VITALS
DIASTOLIC BLOOD PRESSURE: 97 MMHG | OXYGEN SATURATION: 98 % | BODY MASS INDEX: 33.66 KG/M2 | SYSTOLIC BLOOD PRESSURE: 136 MMHG | WEIGHT: 190 LBS | RESPIRATION RATE: 15 BRPM | TEMPERATURE: 97.8 F | HEIGHT: 63 IN | HEART RATE: 71 BPM

## 2022-02-18 DIAGNOSIS — Z98.890 OTHER SPECIFIED POSTPROCEDURAL STATES: Chronic | ICD-10-CM

## 2022-02-18 DIAGNOSIS — Z98.89 OTHER SPECIFIED POSTPROCEDURAL STATES: Chronic | ICD-10-CM

## 2022-02-18 DIAGNOSIS — Z34.90 ENCOUNTER FOR SUPERVISION OF NORMAL PREGNANCY, UNSPECIFIED, UNSPECIFIED TRIMESTER: ICD-10-CM

## 2022-02-18 PROCEDURE — 99213 OFFICE O/P EST LOW 20 MIN: CPT

## 2022-02-18 PROCEDURE — 93306 TTE W/DOPPLER COMPLETE: CPT | Mod: 26

## 2022-02-18 NOTE — ASSESSMENT
[FreeTextEntry1] : We discussed the difference between microscopic and gross hematuria. Potential benign and malignant urological conditions that can cause hematuria were reviewed. Also, non-urologic causes of hematuria were reviewed. Workup including renal imaging (ultrasonography, CT scan, MRI), urine studies and cystoscopy were reviewed. Workup based on risk stratification including age, degree of hematuria and risk factors were discussed. Risks of cystoscopy were discussed. Patient was made aware that despite adequate workup, the cause for hematuria may not be discovered and continued follow-up is recommended. Patient's questions were answered.  She will undergo cystoscopy.  Also in follow-up to complex renal cyst, she will undergo renal imaging with ultrasound.\par \par Matty Pierre MD, FACS\par [facility]\par  of Urology\par \par [address], [address]\[place] [address]\par \par [address]\[place] [address]\par \par Tel: [phone]\par Fax: [phone]
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
## 2022-02-18 NOTE — LETTER BODY
[Dear  ___] : Dear  [unfilled], [Consult Letter:] : I had the pleasure of evaluating your patient, [unfilled]. [Consult Closing:] : Thank you very much for allowing me to participate in the care of this patient.  If you have any questions, please do not hesitate to contact me. [FreeTextEntry1] : [facility]\par [address] \[place], [place], [address]  \par [phone] \par

## 2022-02-18 NOTE — PHYSICAL EXAM
[General Appearance - Well Developed] : well developed [General Appearance - Well Nourished] : well nourished [Normal Appearance] : normal appearance [Well Groomed] : well groomed [General Appearance - In No Acute Distress] : no acute distress [Abdomen Soft] : soft [Abdomen Tenderness] : non-tender [Costovertebral Angle Tenderness] : no ~M costovertebral angle tenderness [FreeTextEntry1] : bladder not distended. [] : no respiratory distress [Respiration, Rhythm And Depth] : normal respiratory rhythm and effort [Exaggerated Use Of Accessory Muscles For Inspiration] : no accessory muscle use

## 2022-02-20 LAB
APPEARANCE: CLEAR
BACTERIA UR CULT: NORMAL
BACTERIA: NEGATIVE
BILIRUBIN URINE: NEGATIVE
BLOOD URINE: NEGATIVE
COLOR: NORMAL
GLUCOSE QUALITATIVE U: NEGATIVE
HYALINE CASTS: 0 /LPF
KETONES URINE: NEGATIVE
LEUKOCYTE ESTERASE URINE: NEGATIVE
MICROSCOPIC-UA: NORMAL
NITRITE URINE: NEGATIVE
PH URINE: 7.5
PROTEIN URINE: NEGATIVE
RED BLOOD CELLS URINE: 6 /HPF
SPECIFIC GRAVITY URINE: 1.02
SQUAMOUS EPITHELIAL CELLS: 1 /HPF
UROBILINOGEN URINE: NORMAL
WHITE BLOOD CELLS URINE: 1 /HPF

## 2022-02-27 RX ORDER — LABETALOL HYDROCHLORIDE 200 MG/1
200 TABLET, FILM COATED ORAL
Qty: 270 | Refills: 3 | Status: DISCONTINUED | COMMUNITY
Start: 2021-11-04 | End: 2022-02-27

## 2022-02-27 NOTE — REASON FOR VISIT
[Hypertension] : hypertension [FreeTextEntry1] :  is a 31 year old woman with PMH of Hyperthyroidism,   delivered 21 at 37 weeks with Preeclampsia. She was discharged with Procardia and Labetalol. She self discontinued Procardia, with Labetalol alone she states that her BP was increasing up to 150-170s regularly and she was not regularly taking it because she felt like it was not working. On , she presented to [place] [facility] with shortness of breath. BPs were up to 170s. She states that she had a CT Head and bloodwork, and was discharged with Hydralazine 10 mg BID which she soon increased to 20 mg BID. She feels that her diastolic BPs are still not optimal. When her BPs have been high in the past, she feels pressure in the back of her head. Of note, she has not been taking Methimazole for some time. She had recent follow up with Endocrinologist, just before she delivered TSH was 0.26 at that time.\par \par \par

## 2022-02-27 NOTE — DISCUSSION/SUMMARY
[FreeTextEntry1] : 31 year old with Preeclampsia s/p delivery late December presenting for follow up\par \par ---Will increase Hydralazine to 30 mg BID. Noted that diastolics are more problematic. Will discuss TSH with Endocrinologist, now that she has delivered and is not on Methimazole. Thyroid issues could contribute to difficulty in BP management. Will check TTE given preeclampsia history.\par \par

## 2022-02-28 ENCOUNTER — NON-APPOINTMENT (OUTPATIENT)
Age: 32
End: 2022-02-28

## 2022-02-28 ENCOUNTER — APPOINTMENT (OUTPATIENT)
Dept: OBGYN | Facility: CLINIC | Age: 32
End: 2022-02-28
Payer: COMMERCIAL

## 2022-02-28 VITALS
BODY MASS INDEX: 36.68 KG/M2 | SYSTOLIC BLOOD PRESSURE: 166 MMHG | HEIGHT: 63 IN | DIASTOLIC BLOOD PRESSURE: 102 MMHG | WEIGHT: 207 LBS

## 2022-02-28 PROCEDURE — 0503F POSTPARTUM CARE VISIT: CPT

## 2022-02-28 NOTE — HISTORY OF PRESENT ILLNESS
[] : delivered by vaginal delivery [Breastfeeding] : currently nursing [Intended Contraception] : Intended Contraception: [Tubal Ligation] : tubal ligation [Back to Normal] : is back to normal in size [Normal] : the vagina was normal [Healing Well] : is healing well [Doing Well] : is doing well [No Sign of Infection] : is showing no signs of infection [Excellent Pain Control] : has excellent pain control [None] : None [FreeTextEntry8] : s/p  - no complaints, no pain, fever, chills, vb. Mood is good - BPs are elevated - pt is seeing cardiology and is on hydralazine. High bp makes her stressed out  [de-identified] : s/p , HTN  [de-identified] : PAP done, pt requesting BTL - discussed lap btl, bs, IUD - pt desires lap btl - tubal papers today, will pamelaudsophia - will need cards clearance

## 2022-03-02 ENCOUNTER — APPOINTMENT (OUTPATIENT)
Dept: CARDIOLOGY | Facility: CLINIC | Age: 32
End: 2022-03-02
Payer: COMMERCIAL

## 2022-03-02 PROCEDURE — 99442: CPT

## 2022-03-07 ENCOUNTER — APPOINTMENT (OUTPATIENT)
Dept: ENDOCRINOLOGY | Facility: CLINIC | Age: 32
End: 2022-03-07
Payer: COMMERCIAL

## 2022-03-07 ENCOUNTER — OUTPATIENT (OUTPATIENT)
Dept: OUTPATIENT SERVICES | Facility: HOSPITAL | Age: 32
LOS: 1 days | End: 2022-03-07
Payer: COMMERCIAL

## 2022-03-07 ENCOUNTER — APPOINTMENT (OUTPATIENT)
Dept: ULTRASOUND IMAGING | Facility: CLINIC | Age: 32
End: 2022-03-07
Payer: COMMERCIAL

## 2022-03-07 VITALS
BODY MASS INDEX: 36.14 KG/M2 | TEMPERATURE: 97.3 F | DIASTOLIC BLOOD PRESSURE: 90 MMHG | WEIGHT: 204 LBS | HEART RATE: 94 BPM | OXYGEN SATURATION: 99 % | HEIGHT: 63 IN | SYSTOLIC BLOOD PRESSURE: 130 MMHG

## 2022-03-07 DIAGNOSIS — Z98.890 OTHER SPECIFIED POSTPROCEDURAL STATES: Chronic | ICD-10-CM

## 2022-03-07 DIAGNOSIS — Z98.89 OTHER SPECIFIED POSTPROCEDURAL STATES: Chronic | ICD-10-CM

## 2022-03-07 DIAGNOSIS — R31.21 ASYMPTOMATIC MICROSCOPIC HEMATURIA: ICD-10-CM

## 2022-03-07 PROCEDURE — 99214 OFFICE O/P EST MOD 30 MIN: CPT | Mod: 25

## 2022-03-07 PROCEDURE — 76536 US EXAM OF HEAD AND NECK: CPT | Mod: 52

## 2022-03-07 PROCEDURE — 76775 US EXAM ABDO BACK WALL LIM: CPT | Mod: 26

## 2022-03-07 PROCEDURE — 76775 US EXAM ABDO BACK WALL LIM: CPT

## 2022-03-07 NOTE — ASSESSMENT
[FreeTextEntry1] : \par 31 year old female with history of subclinical hyperthyroidism ( likely 2/2 ab negative Graves disease) and thyroid nodules s/p delivery  3 months prior \par \par -Will check baseline TFTs\par -Symptomatically hypothyroid\par -Thyroid ultrasound today showing: 0.65 x 0.27 x 0.54 cm spongiform nodule \par -0.26 x 0.12 x 0.21 cm nodule mixed nodule \par -Left sided 0.63 cm cyst \par \par Hypertension \par -Check aldosterone and renin \par -continue Hydralazine 50 mg TID \par \par -Follow up in 3 months. \par  \par
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
## 2022-03-08 RX ORDER — FUROSEMIDE 20 MG/1
20 TABLET ORAL
Qty: 60 | Refills: 3 | Status: ACTIVE | COMMUNITY
Start: 2022-03-08 | End: 1900-01-01

## 2022-03-09 ENCOUNTER — APPOINTMENT (OUTPATIENT)
Dept: UROLOGY | Facility: CLINIC | Age: 32
End: 2022-03-09
Payer: COMMERCIAL

## 2022-03-09 VITALS
HEIGHT: 63 IN | BODY MASS INDEX: 36.14 KG/M2 | TEMPERATURE: 97.7 F | HEART RATE: 87 BPM | DIASTOLIC BLOOD PRESSURE: 97 MMHG | WEIGHT: 204 LBS | SYSTOLIC BLOOD PRESSURE: 150 MMHG | RESPIRATION RATE: 14 BRPM | OXYGEN SATURATION: 98 %

## 2022-03-09 DIAGNOSIS — R31.21 ASYMPTOMATIC MICROSCOPIC HEMATURIA: ICD-10-CM

## 2022-03-09 PROCEDURE — 52000 CYSTOURETHROSCOPY: CPT

## 2022-03-10 ENCOUNTER — NON-APPOINTMENT (OUTPATIENT)
Age: 32
End: 2022-03-10

## 2022-03-10 LAB
ALDOSTERONE SERUM: 11.9 NG/DL
CYTOLOGY CVX/VAG DOC THIN PREP: ABNORMAL
HPV HIGH+LOW RISK DNA PNL CVX: DETECTED
RENIN PLASMA: 6.9 PG/ML
T3 SERPL-MCNC: 101 NG/DL
T3 SERPL-MCNC: 96 NG/DL
T4 FREE SERPL-MCNC: 1.1 NG/DL
T4 FREE SERPL-MCNC: 1.1 NG/DL
TSH SERPL-ACNC: 0.41 UIU/ML
TSH SERPL-ACNC: 0.55 UIU/ML
TSI ACT/NOR SER: 0.31 IU/L

## 2022-03-17 ENCOUNTER — NON-APPOINTMENT (OUTPATIENT)
Age: 32
End: 2022-03-17

## 2022-03-21 LAB
METANEPHRINE, PL: <10 PG/ML
NORMETANEPHRINE, PL: 59.1 PG/ML

## 2022-03-25 ENCOUNTER — NON-APPOINTMENT (OUTPATIENT)
Age: 32
End: 2022-03-25

## 2022-03-25 DIAGNOSIS — R00.0 TACHYCARDIA, UNSPECIFIED: ICD-10-CM

## 2022-03-25 RX ORDER — METOPROLOL SUCCINATE 25 MG/1
25 TABLET, EXTENDED RELEASE ORAL DAILY
Qty: 60 | Refills: 2 | Status: ACTIVE | COMMUNITY
Start: 2022-03-25 | End: 1900-01-01

## 2022-03-25 RX ORDER — NIFEDIPINE 30 MG/1
30 TABLET, FILM COATED, EXTENDED RELEASE ORAL DAILY
Qty: 30 | Refills: 0 | Status: DISCONTINUED | COMMUNITY
Start: 2021-12-07 | End: 2022-03-25

## 2022-03-25 NOTE — HISTORY OF PRESENT ILLNESS
[Home] : at home, [unfilled] , at the time of the visit. [FreeTextEntry1] :  is a 31 year old woman with PMH of Hyperthyroidism,   delivered 21 at 37 weeks with Preeclampsia. She was discharged with Procardia and Labetalol. She self discontinued Procardia, with Labetalol alone she states that her BP was increasing up to 150-170s regularly and she was not regularly taking it because she felt like it was not working. On , she presented to [facility] with shortness of breath. BPs were up to 170s. She states that she had a CT Head and bloodwork, and was discharged with Hydralazine 10 mg BID which she soon increased to 20 mg BID. She feels that her diastolic BPs are still not optimal. When her BPs have been high in the past, she feels pressure in the back of her head. SInce her visit with me, she remains with recurring elevated diastolics up to 90-100s. She notes that her weight has increased and she feels that her legs are swollen.

## 2022-03-25 NOTE — DISCUSSION/SUMMARY
[FreeTextEntry1] : 31 year old with Preeclampsia s/p delivery late December presenting for follow up\par \par ---Will increase Hydralazine to 50 mg TID. Will also add Lasix given leg swelling and increased weight. \par ---TTE with no structural heart abnormality, normal biventricular function. \par \par

## 2022-03-30 ENCOUNTER — APPOINTMENT (OUTPATIENT)
Dept: CARDIOLOGY | Facility: CLINIC | Age: 32
End: 2022-03-30

## 2022-04-04 LAB — RENIN ACTIVITY, PLASMA: 1.11 NG/ML/HR

## 2022-04-26 ENCOUNTER — APPOINTMENT (OUTPATIENT)
Dept: OBGYN | Facility: HOSPITAL | Age: 32
End: 2022-04-26

## 2022-07-12 DIAGNOSIS — N28.1 CYST OF KIDNEY, ACQUIRED: ICD-10-CM

## 2022-07-21 ENCOUNTER — APPOINTMENT (OUTPATIENT)
Dept: ULTRASOUND IMAGING | Facility: CLINIC | Age: 32
End: 2022-07-21

## 2022-07-21 ENCOUNTER — OUTPATIENT (OUTPATIENT)
Dept: OUTPATIENT SERVICES | Facility: HOSPITAL | Age: 32
LOS: 1 days | End: 2022-07-21
Payer: COMMERCIAL

## 2022-07-21 DIAGNOSIS — Z98.89 OTHER SPECIFIED POSTPROCEDURAL STATES: Chronic | ICD-10-CM

## 2022-07-21 DIAGNOSIS — Z00.8 ENCOUNTER FOR OTHER GENERAL EXAMINATION: ICD-10-CM

## 2022-07-21 DIAGNOSIS — Z98.890 OTHER SPECIFIED POSTPROCEDURAL STATES: Chronic | ICD-10-CM

## 2022-07-21 PROCEDURE — 76775 US EXAM ABDO BACK WALL LIM: CPT | Mod: 26

## 2022-07-21 PROCEDURE — 76775 US EXAM ABDO BACK WALL LIM: CPT

## 2022-07-25 ENCOUNTER — NON-APPOINTMENT (OUTPATIENT)
Age: 32
End: 2022-07-25

## 2022-09-14 ENCOUNTER — APPOINTMENT (OUTPATIENT)
Dept: ENDOCRINOLOGY | Facility: CLINIC | Age: 32
End: 2022-09-14

## 2022-09-14 VITALS
SYSTOLIC BLOOD PRESSURE: 126 MMHG | DIASTOLIC BLOOD PRESSURE: 90 MMHG | HEART RATE: 85 BPM | WEIGHT: 213 LBS | HEIGHT: 63 IN | BODY MASS INDEX: 37.74 KG/M2 | TEMPERATURE: 97.8 F | OXYGEN SATURATION: 98 %

## 2022-09-14 PROCEDURE — 99214 OFFICE O/P EST MOD 30 MIN: CPT
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
## 2022-09-14 NOTE — ASSESSMENT
[FreeTextEntry1] : 31 year old female with history of subclinical hyperthyroidism (likely 2/2 ab negative Graves disease) and thyroid nodules s/p delivery 3 months prior \par \par -Will check baseline TFTs\par -Symptomatically hyperthyroid ( heart palpitations)\par -Thyroid ultrasound today showing: 0.65 x 0.27 x 0.54 cm spongiform nodule \par -0.26 x 0.12 x 0.21 cm nodule mixed nodule, Left sided 0.63 cm cyst \par \par Hypertension \par -continue Hydralazine 50 mg TID \par \par -Follow up in 3 months. \par  \par

## 2022-09-14 NOTE — REVIEW OF SYSTEMS
[Fatigue] : no fatigue [Decreased Appetite] : appetite not decreased [Recent Weight Gain (___ Lbs)] : no recent weight gain [Recent Weight Loss (___ Lbs)] : no recent weight loss [Visual Field Defect] : no visual field defect [Dry Eyes] : no dryness [Dysphagia] : no dysphagia [Neck Pain] : no neck pain [Dysphonia] : no dysphonia [Chest Pain] : no chest pain [Slow Heart Rate] : heart rate is not slow [Palpitations] : no palpitations [Fast Heart Rate] : heart rate is not fast [Shortness Of Breath] : no shortness of breath [Cough] : no cough [Nausea] : no nausea [Constipation] : no constipation [Vomiting] : no vomiting [Diarrhea] : no diarrhea [Polyuria] : no polyuria [Irregular Menses] : regular menses [Joint Pain] : no joint pain [Muscle Weakness] : no muscle weakness [Acanthosis] : no acanthosis  [Acne] : no acne [Headaches] : no headaches [Dizziness] : no dizziness [Tremors] : no tremors [Pain/Numbness of Digits] : no pain/numbness of digits [Depression] : no depression [Polydipsia] : no polydipsia [Cold Intolerance] : no cold intolerance [Easy Bleeding] : no ~M tendency for easy bleeding [Easy Bruising] : no tendency for easy bruising

## 2022-10-03 NOTE — PATIENT PROFILE OB - POST PARTUM DEPRESSION SCREEN OB 4
Pt waiting for disposition. Pt also reports that he did not take his b/p medication this am. Pt reports that he is still having the tingling to his am. No CP or SOB reported.   no

## 2022-10-09 LAB
ALBUMIN SERPL ELPH-MCNC: 4.4 G/DL
ALP BLD-CCNC: 80 U/L
ALT SERPL-CCNC: 13 U/L
ANION GAP SERPL CALC-SCNC: 13 MMOL/L
AST SERPL-CCNC: 12 U/L
BASOPHILS # BLD AUTO: 0.14 K/UL
BASOPHILS NFR BLD AUTO: 2.9 %
BILIRUB SERPL-MCNC: 0.2 MG/DL
BUN SERPL-MCNC: 12 MG/DL
CALCIUM SERPL-MCNC: 9.8 MG/DL
CHLORIDE SERPL-SCNC: 104 MMOL/L
CO2 SERPL-SCNC: 22 MMOL/L
CREAT SERPL-MCNC: 0.68 MG/DL
EGFR: 118 ML/MIN/1.73M2
EOSINOPHIL # BLD AUTO: 0.24 K/UL
EOSINOPHIL NFR BLD AUTO: 5 %
GLUCOSE SERPL-MCNC: 82 MG/DL
HCT VFR BLD CALC: 35.4 %
HGB BLD-MCNC: 11.5 G/DL
IMM GRANULOCYTES NFR BLD AUTO: 0.2 %
LYMPHOCYTES # BLD AUTO: 2.08 K/UL
LYMPHOCYTES NFR BLD AUTO: 43.4 %
MAN DIFF?: NORMAL
MCHC RBC-ENTMCNC: 28 PG
MCHC RBC-ENTMCNC: 32.5 GM/DL
MCV RBC AUTO: 86.3 FL
MONOCYTES # BLD AUTO: 0.51 K/UL
MONOCYTES NFR BLD AUTO: 10.6 %
NEUTROPHILS # BLD AUTO: 1.81 K/UL
NEUTROPHILS NFR BLD AUTO: 37.9 %
PLATELET # BLD AUTO: 311 K/UL
POTASSIUM SERPL-SCNC: 4.6 MMOL/L
PROT SERPL-MCNC: 7.3 G/DL
RBC # BLD: 4.1 M/UL
RBC # FLD: 14.4 %
SODIUM SERPL-SCNC: 139 MMOL/L
T3 SERPL-MCNC: 107 NG/DL
T4 FREE SERPL-MCNC: 1.2 NG/DL
TSH SERPL-ACNC: 0.59 UIU/ML
TSI ACT/NOR SER: 1.14 IU/L
WBC # FLD AUTO: 4.79 K/UL

## 2022-12-25 ENCOUNTER — NON-APPOINTMENT (OUTPATIENT)
Age: 32
End: 2022-12-25

## 2023-02-01 ENCOUNTER — APPOINTMENT (OUTPATIENT)
Dept: ENDOCRINOLOGY | Facility: CLINIC | Age: 33
End: 2023-02-01

## 2023-03-10 ENCOUNTER — APPOINTMENT (OUTPATIENT)
Dept: ENDOCRINOLOGY | Facility: CLINIC | Age: 33
End: 2023-03-10
Payer: COMMERCIAL

## 2023-03-10 VITALS
BODY MASS INDEX: 34.55 KG/M2 | OXYGEN SATURATION: 98 % | HEIGHT: 63 IN | SYSTOLIC BLOOD PRESSURE: 127 MMHG | DIASTOLIC BLOOD PRESSURE: 73 MMHG | WEIGHT: 195 LBS | HEART RATE: 78 BPM

## 2023-03-10 DIAGNOSIS — I10 ESSENTIAL (PRIMARY) HYPERTENSION: ICD-10-CM

## 2023-03-10 PROCEDURE — 76536 US EXAM OF HEAD AND NECK: CPT

## 2023-03-10 PROCEDURE — 99214 OFFICE O/P EST MOD 30 MIN: CPT | Mod: 25

## 2023-03-12 LAB
ALBUMIN SERPL ELPH-MCNC: 4.7 G/DL
ALP BLD-CCNC: 68 U/L
ALT SERPL-CCNC: 14 U/L
ANION GAP SERPL CALC-SCNC: 13 MMOL/L
AST SERPL-CCNC: 17 U/L
BASOPHILS # BLD AUTO: 0.11 K/UL
BASOPHILS NFR BLD AUTO: 2.2 %
BILIRUB SERPL-MCNC: 0.2 MG/DL
BUN SERPL-MCNC: 13 MG/DL
CALCIUM SERPL-MCNC: 10.3 MG/DL
CHLORIDE SERPL-SCNC: 102 MMOL/L
CO2 SERPL-SCNC: 24 MMOL/L
CREAT SERPL-MCNC: 0.74 MG/DL
EGFR: 109 ML/MIN/1.73M2
EOSINOPHIL # BLD AUTO: 0.1 K/UL
EOSINOPHIL NFR BLD AUTO: 2 %
GLUCOSE SERPL-MCNC: 87 MG/DL
HCT VFR BLD CALC: 38.8 %
HGB BLD-MCNC: 12.5 G/DL
IMM GRANULOCYTES NFR BLD AUTO: 0 %
LYMPHOCYTES # BLD AUTO: 2.76 K/UL
LYMPHOCYTES NFR BLD AUTO: 55.2 %
MAN DIFF?: NORMAL
MCHC RBC-ENTMCNC: 28.5 PG
MCHC RBC-ENTMCNC: 32.2 GM/DL
MCV RBC AUTO: 88.4 FL
MONOCYTES # BLD AUTO: 0.4 K/UL
MONOCYTES NFR BLD AUTO: 8 %
NEUTROPHILS # BLD AUTO: 1.63 K/UL
NEUTROPHILS NFR BLD AUTO: 32.6 %
PLATELET # BLD AUTO: 309 K/UL
POTASSIUM SERPL-SCNC: 4.6 MMOL/L
PROT SERPL-MCNC: 7.8 G/DL
RBC # BLD: 4.39 M/UL
RBC # FLD: 14.1 %
SODIUM SERPL-SCNC: 139 MMOL/L
T3 SERPL-MCNC: 104 NG/DL
T4 FREE SERPL-MCNC: 1.1 NG/DL
TSH SERPL-ACNC: 1.41 UIU/ML
WBC # FLD AUTO: 5 K/UL

## 2023-03-13 PROBLEM — I10 CHRONIC HYPERTENSION: Status: ACTIVE | Noted: 2021-12-05
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
## 2023-03-13 NOTE — PROCEDURE
[Attune RTD e 2008 model, 10-12 MHz frequencies] : multiple real time longitudinal and transverse images were obtained using a high resolution ultrasound with a linear transducer, Attune RTD e 2008 model, 10-12 MHz frequencies. All measurements will be reported as longitudinal x ravin-posterior x transverse. [] : a homogeneous parenchyma [Upper] : upper pole there is a  [Right Thyroid] : right [Lower] : lower pole there is a  [No calcifications] : no calcifications [Left Thyroid] : left [Mid] : mid pole there is a  [Solid] : solid [Hypoechoic] : hypoechoic nodule [Ovoid] : ovoid in shape [Smooth] : smooth [No] : does not have a halo [Peripheral vascularity] : peripheral vascularity [2] : 2 [No abnormal lymph nodes are seen.] : no abnormal lymph nodes are seen [FreeTextEntry5] : 3.34 x 1.69 x 1.69 [FreeTextEntry1] : 3.85 x 1.44 x 1.88 [FreeTextEntry2] : 0.21 [FreeTextEntry3] : 0.6 x 0.35 x 0.51

## 2023-03-13 NOTE — IMPRESSION
[FreeTextEntry1] : Bilateral sub-cm nodules, no changes at this time  [FreeTextEntry2] : Follow up in one year

## 2023-03-13 NOTE — ASSESSMENT
[FreeTextEntry1] : 32 year old female with history of subclinical hyperthyroidism (likely 2/2 ab negative Graves disease) and thyroid nodules s/p delivery 3 months prior \par \par -Will check baseline TFTs\par -Multiple sub-cm nodules displaying interval stability \par \par Hypertension \par -continue Hydralazine 50 mg TID \par \par -Follow up in 3 months. \par  \par

## 2023-03-13 NOTE — OB PROVIDER H&P - NSRISKFACTORS_OBGYN_ALL_OB
Subjective   Douglas Hong is a 75 y.o. male     Chief Complaint: CKD stage 4    History of Present Illness  76 yo gentleman referred to discuss peritoneal dialysis catheter placement. He has CKD 4 and is nearing the need for dialysis. He is a patient of Dr. Galloway and wishes to attempt peritoneal dialysis.        Review of Systems   Constitutional: Negative.    HENT: Negative.    Eyes: Negative.    Respiratory: Negative.    Cardiovascular: Negative.    Gastrointestinal: Negative.    Endocrine: Positive for heat intolerance.   Genitourinary: Negative.    Musculoskeletal: Negative.    Skin: Negative.    Allergic/Immunologic: Negative.    Neurological: Negative.    Hematological: Bruises/bleeds easily.   Psychiatric/Behavioral: Negative.      Past Medical History:   Diagnosis Date   • CHF (congestive heart failure) (HCC)    • Diabetes (HCC)    • Hyperlipidemia    • Hypertension    • Kidney disease     stage 4     Past Surgical History:   Procedure Laterality Date   • CARDIAC CATHETERIZATION N/A 2022    Procedure: Left Heart Cath;  Surgeon: Aaron Rivera MD;  Location: [facility] CATH INVASIVE LOCATION;  Service: Cardiology;  Laterality: N/A;   • COLONOSCOPY N/A 2022    Procedure: COLONOSCOPY;  Surgeon: Clinton Gasca MD;  Location: [facility] ENDOSCOPY;  Service: Gastroenterology;  Laterality: N/A;   • SINUS SURGERY       Family History   Problem Relation Age of Onset   • Cancer Mother    • Heart disease Mother      Social History     Socioeconomic History   • Marital status:    Tobacco Use   • Smoking status: Former     Packs/day: 1.00     Types: Cigarettes     Start date:      Quit date: 10/1983     Years since quittin.4   • Smokeless tobacco: Never   Vaping Use   • Vaping Use: Never used   Substance and Sexual Activity   • Alcohol use: Yes     Comment: occasional   •  Drug use: Never   • Sexual activity: Defer     No Known Allergies  Vitals:    03/13/23 1039   BP: 160/57   Pulse: 68   Temp: 96.4 °F (35.8 °C)   SpO2: 98%       Home Medications:  Prior to Admission medications    Medication Sig Start Date End Date Taking? Authorizing Provider   allopurinol (ZYLOPRIM) 100 MG tablet Take 1 tablet by mouth 2 (Two) Times a Day. 10/1/20  Yes Fariba Jackson MD   aspirin 81 MG EC tablet Take 1 tablet by mouth Every Night.   Yes Fariba Jackson MD   bumetanide (BUMEX) 2 MG tablet Take 1 tablet by mouth Daily.   Yes Fariba Jackson MD   calcitriol (ROCALTROL) 0.25 MCG capsule Take 1 capsule by mouth Every Morning. 2/9/23  Yes Fariba Jackson MD   calcitriol (ROCALTROL) 0.5 MCG capsule Take 1 capsule by mouth Every Morning. 2/9/23  Yes Fariba Jackson MD   clobetasol (TEMOVATE) 0.05 % ointment Apply 1 application topically to the appropriate area as directed As Needed.   Yes Fariba Jackson MD   Continuous Blood Gluc  (FreeStyle Jarad 2 Donora) device 1 each Continuous. Use as indicated for glucose monitoring 2/2/22  Yes Rod Schofield MD   Continuous Blood Gluc Sensor (FreeStyle Jarad 2 Sensor) misc 1 each Every 14 (Fourteen) Days. 3/6/23  Yes Rod Schofield MD   Diclofenac Sodium (VOLTAREN) 1 % gel gel Apply 4 g topically to the appropriate area as directed 4 (Four) Times a Day As Needed.   Yes Fariba Jackson MD   fluticasone (FLONASE) 50 MCG/ACT nasal spray 1 spray into the nostril(s) as directed by provider As Needed. 10/1/20  Yes Fariba Jackson MD   hydrALAZINE (APRESOLINE) 100 MG tablet Take 1 tablet by mouth Every 12 (Twelve) Hours. 2/9/23  Yes Fariba Jackson MD   isosorbide dinitrate (ISORDIL) 10 MG tablet Take 1 tablet by mouth 3 (Three) Times a Day.   Yes Fariba Jackson MD   metoprolol succinate XL (TOPROL-XL) 50 MG 24 hr tablet Take 1 tablet by mouth Every Morning. 11/1/22  Yes Manuel  MD Fariba   multivitamin (THERAGRAN) tablet tablet Take 1 tablet by mouth Daily.   Yes Fariba Jackson MD   Omega-3 Fatty Acids (fish oil) 1000 MG capsule capsule Take 1 capsule by mouth 2 (Two) Times a Day With Meals.   Yes Fariba Jackson MD   potassium chloride (K-DUR,KLOR-CON) 20 MEQ CR tablet Take 1 tablet by mouth Daily. 10/1/20  Yes Fariba Jackson MD   terazosin (HYTRIN) 10 MG capsule Take 5 mg by mouth Every Night. 10/1/20  Yes Fariba Jackson MD   triamcinolone (KENALOG) 0.1 % cream Apply 1 application topically to the appropriate area as directed 2 (Two) Times a Day As Needed. PRN   Yes Fariba Jackson MD   cetirizine (zyrTEC) 10 MG tablet Take 1 tablet by mouth Daily. 10/1/20   Fariba Jackson MD   hydrALAZINE (APRESOLINE) 50 MG tablet Take 1 tablet by mouth 2 (Two) Times a Day. 11/3/20   Fariba Jackson MD   insulin aspart (NovoLOG FlexPen) 100 UNIT/ML solution pen-injector sc pen Use 2 units for glucose 181-250 and 4 units for glucose above 250 3/6/23   Rod Schofield MD       Objective   Physical Exam  Constitutional:       Appearance: Normal appearance.   HENT:      Head: Normocephalic and atraumatic.      Nose: Nose normal. No congestion.      Mouth/Throat:      Mouth: Mucous membranes are moist.      Pharynx: Oropharynx is clear.   Eyes:      General: No scleral icterus.     Pupils: Pupils are equal, round, and reactive to light.   Cardiovascular:      Rate and Rhythm: Normal rate and regular rhythm.   Pulmonary:      Effort: Pulmonary effort is normal. No respiratory distress.   Abdominal:      General: Abdomen is flat.      Palpations: Abdomen is soft.   Skin:     General: Skin is warm and dry.   Neurological:      General: No focal deficit present.      Mental Status: He is alert and oriented to person, place, and time.   Psychiatric:         Mood and Affect: Mood normal.         Behavior: Behavior normal.         Assessment & Plan        The encounter diagnosis was CKD (chronic kidney disease) stage 4, GFR 15-29 ml/min ([facility]).    - will plan on laparoscopic assisted peritoneal dialysis catheter placement. We discussed the risks, benefits, and alternatives to catheter placement including bleeding infection, injury to abdominal structures, need for future adjustments. He understands these risks and wishes to proceed with catheter placement.                 This document has been electronically signed by Dontae Rosales MD on March 14, 2023 08:35 CDT         Yes

## 2023-03-13 NOTE — REVIEW OF SYSTEMS
[Fatigue] : no fatigue [Decreased Appetite] : appetite not decreased [Visual Field Defect] : no visual field defect [Dysphagia] : no dysphagia [Neck Pain] : no neck pain [Dysphonia] : no dysphonia [Nasal Congestion] : no nasal congestion [Chest Pain] : no chest pain [Palpitations] : no palpitations [Fast Heart Rate] : heart rate is not fast [Shortness Of Breath] : no shortness of breath [Cough] : no cough [Nausea] : no nausea [Constipation] : no constipation [Vomiting] : no vomiting [Diarrhea] : no diarrhea [Polyuria] : no polyuria [Irregular Menses] : regular menses [Joint Pain] : no joint pain [Acanthosis] : no acanthosis  [Headaches] : no headaches [Tremors] : no tremors [Depression] : no depression [Polydipsia] : no polydipsia [Cold Intolerance] : no cold intolerance [Easy Bleeding] : no ~M tendency for easy bleeding [Easy Bruising] : no tendency for easy bruising

## 2023-03-13 NOTE — PROCEDURE
[Cloudyn e 2008 model, 10-12 MHz frequencies] : multiple real time longitudinal and transverse images were obtained using a high resolution ultrasound with a linear transducer, Cloudyn e 2008 model, 10-12 MHz frequencies. All measurements will be reported as longitudinal x ravin-posterior x transverse. [] : a homogeneous parenchyma [Upper] : upper pole there is a  [Right Thyroid] : right [Lower] : lower pole there is a  [No calcifications] : no calcifications [Left Thyroid] : left [Mid] : mid pole there is a  [Solid] : solid [Hypoechoic] : hypoechoic nodule [Ovoid] : ovoid in shape [Smooth] : smooth [No] : does not have a halo [Peripheral vascularity] : peripheral vascularity [2] : 2 [No abnormal lymph nodes are seen.] : no abnormal lymph nodes are seen [FreeTextEntry5] : 3.34 x 1.69 x 1.69 [FreeTextEntry1] : 3.85 x 1.44 x 1.88 [FreeTextEntry2] : 0.21 [FreeTextEntry3] : 0.6 x 0.35 x 0.51

## 2023-03-20 LAB — TSI ACT/NOR SER: 0.46 IU/L

## 2023-03-30 NOTE — DISCHARGE NOTE OB - SECONDARY DIAGNOSIS.
Preeclampsia, third trimester Cimzia Pregnancy And Lactation Text: This medication crosses the placenta but can be considered safe in certain situations. Cimzia may be excreted in breast milk.

## 2023-04-21 ENCOUNTER — NON-APPOINTMENT (OUTPATIENT)
Age: 33
End: 2023-04-21

## 2023-04-23 ENCOUNTER — NON-APPOINTMENT (OUTPATIENT)
Age: 33
End: 2023-04-23

## 2023-04-26 ENCOUNTER — APPOINTMENT (OUTPATIENT)
Dept: OBGYN | Facility: CLINIC | Age: 33
End: 2023-04-26
Payer: COMMERCIAL

## 2023-04-26 VITALS
BODY MASS INDEX: 34.73 KG/M2 | WEIGHT: 196 LBS | SYSTOLIC BLOOD PRESSURE: 140 MMHG | DIASTOLIC BLOOD PRESSURE: 98 MMHG | HEIGHT: 63 IN

## 2023-04-26 DIAGNOSIS — R35.0 FREQUENCY OF MICTURITION: ICD-10-CM

## 2023-04-26 DIAGNOSIS — R10.2 PELVIC AND PERINEAL PAIN: ICD-10-CM

## 2023-04-26 DIAGNOSIS — Z01.419 ENCOUNTER FOR GYNECOLOGICAL EXAMINATION (GENERAL) (ROUTINE) W/OUT ABNORMAL FINDINGS: ICD-10-CM

## 2023-04-26 PROCEDURE — 99395 PREV VISIT EST AGE 18-39: CPT

## 2023-04-26 RX ORDER — HYDRALAZINE HYDROCHLORIDE 50 MG/1
50 TABLET ORAL
Qty: 60 | Refills: 0 | Status: ACTIVE | COMMUNITY
Start: 2023-04-26 | End: 1900-01-01

## 2023-04-26 RX ORDER — NORETHINDRONE 0.35 MG/1
0.35 TABLET ORAL
Qty: 3 | Refills: 3 | Status: ACTIVE | COMMUNITY
Start: 2023-04-26 | End: 1900-01-01

## 2023-04-26 NOTE — DISCUSSION/SUMMARY
[FreeTextEntry1] : - Pap/HPV obtained today\par - Contraceptive options reviewed; pt currently on a combined OCP which is contraindicated w/hypertension; will switch to progesterone only method\par - Referral for pelvic sono rendered secondary to pelvic pressure\par - Pt interested in permanent sterilization and will return for consult\par - Hydralazine refilled as pt reporting difficulty reaching cardiologist; urged follow up; will give one month supply\par - Urogyn referral for pelvic pressure and urinary symptoms

## 2023-04-26 NOTE — PHYSICAL EXAM
[Appropriately responsive] : appropriately responsive [Alert] : alert [No Acute Distress] : no acute distress [Soft] : soft [Non-tender] : non-tender [Non-distended] : non-distended [No HSM] : No HSM [No Lesions] : no lesions [Oriented x3] : oriented x3 [No Mass] : no mass [Examination Of The Breasts] : a normal appearance [No Masses] : no breast masses were palpable [Labia Majora] : normal [Labia Minora] : normal [Normal] : normal [Uterine Adnexae] : normal

## 2023-04-26 NOTE — HISTORY OF PRESENT ILLNESS
[No] : Patient does not have concerns regarding sex [FreeTextEntry1] : 33yo  LMP 23 herefor annual exam.  Reporting pelvic pressure.\par \par  x 5\par 2007, male\par 10/10/2010, female\par 2017, male\par 2019, male\par 2021, female\par \par SAB \par TOP

## 2023-04-28 LAB
C TRACH RRNA SPEC QL NAA+PROBE: NOT DETECTED
HPV HIGH+LOW RISK DNA PNL CVX: DETECTED
N GONORRHOEA RRNA SPEC QL NAA+PROBE: NOT DETECTED
SOURCE AMPLIFICATION: NORMAL
SOURCE TP AMPLIFICATION: NORMAL
T VAGINALIS RRNA SPEC QL NAA+PROBE: NOT DETECTED

## 2023-04-30 ENCOUNTER — NON-APPOINTMENT (OUTPATIENT)
Age: 33
End: 2023-04-30

## 2023-05-02 ENCOUNTER — NON-APPOINTMENT (OUTPATIENT)
Age: 33
End: 2023-05-02

## 2023-05-02 DIAGNOSIS — B96.89 ACUTE VAGINITIS: ICD-10-CM

## 2023-05-02 DIAGNOSIS — N76.0 ACUTE VAGINITIS: ICD-10-CM

## 2023-05-02 LAB — CYTOLOGY CVX/VAG DOC THIN PREP: ABNORMAL

## 2023-05-02 RX ORDER — METRONIDAZOLE 500 MG/1
500 TABLET ORAL TWICE DAILY
Qty: 14 | Refills: 0 | Status: ACTIVE | COMMUNITY
Start: 2023-05-02 | End: 1900-01-01

## 2023-05-13 ENCOUNTER — APPOINTMENT (OUTPATIENT)
Dept: ULTRASOUND IMAGING | Facility: CLINIC | Age: 33
End: 2023-05-13
Payer: COMMERCIAL

## 2023-05-13 ENCOUNTER — OUTPATIENT (OUTPATIENT)
Dept: OUTPATIENT SERVICES | Facility: HOSPITAL | Age: 33
LOS: 1 days | End: 2023-05-13
Payer: COMMERCIAL

## 2023-05-13 ENCOUNTER — RESULT REVIEW (OUTPATIENT)
Age: 33
End: 2023-05-13

## 2023-05-13 DIAGNOSIS — R10.2 PELVIC AND PERINEAL PAIN: ICD-10-CM

## 2023-05-13 DIAGNOSIS — Z00.8 ENCOUNTER FOR OTHER GENERAL EXAMINATION: ICD-10-CM

## 2023-05-13 DIAGNOSIS — Z98.89 OTHER SPECIFIED POSTPROCEDURAL STATES: Chronic | ICD-10-CM

## 2023-05-13 PROCEDURE — 76856 US EXAM PELVIC COMPLETE: CPT

## 2023-05-13 PROCEDURE — 76830 TRANSVAGINAL US NON-OB: CPT

## 2023-05-13 PROCEDURE — 76830 TRANSVAGINAL US NON-OB: CPT | Mod: 26

## 2023-05-13 PROCEDURE — 76856 US EXAM PELVIC COMPLETE: CPT | Mod: 26,59

## 2023-05-16 ENCOUNTER — NON-APPOINTMENT (OUTPATIENT)
Age: 33
End: 2023-05-16

## 2023-05-18 ENCOUNTER — APPOINTMENT (OUTPATIENT)
Dept: UROGYNECOLOGY | Facility: CLINIC | Age: 33
End: 2023-05-18

## 2023-05-18 NOTE — ED PROVIDER NOTE - ATTENDING CONTRIBUTION TO CARE
[de-identified] : SAMSON ROBERTS is a 47 year female being seen for f/u visit of R elbow pain. Currently, she c/o lateral elbow pain. She has had multiple cortisone injections with some relief. She notes her pain has returned. She started working out again at [employer]. She notes she is unable to fully extend her arm. I was physically present for the E/M service provided. I agree with above history, physical, and plan which I have reviewed and edited where appropriate. I was physically present for the key portions of the service provided.    Attending Exam - Dr. Davalos: GEN: well appearing, NAD  HEENT: +PERRL, EOMI  RESP: CTAB, no signs of respiratory distress CV: s1s2 RRR ABD: soft/non tender/non distended  MSK: no deformities / swelling, normal range of motion, spine grossly normal NEURO: alert, non focal exam SKIN: normal color / temperature / condition.    Attending MDM: concerning story with red flags given family hx, abnl ECG, will need echo, also CTPA, BP control, OBGYN consult as borderline for post-partum pre-eclampsia.

## 2023-05-25 ENCOUNTER — APPOINTMENT (OUTPATIENT)
Dept: MRI IMAGING | Facility: CLINIC | Age: 33
End: 2023-05-25
Payer: COMMERCIAL

## 2023-05-25 PROCEDURE — 72197 MRI PELVIS W/O & W/DYE: CPT

## 2023-05-25 PROCEDURE — A9585: CPT | Mod: JW

## 2023-06-01 ENCOUNTER — NON-APPOINTMENT (OUTPATIENT)
Age: 33
End: 2023-06-01

## 2023-06-14 ENCOUNTER — APPOINTMENT (OUTPATIENT)
Dept: OBGYN | Facility: CLINIC | Age: 33
End: 2023-06-14
Payer: COMMERCIAL

## 2023-06-14 VITALS
WEIGHT: 191 LBS | HEIGHT: 63 IN | BODY MASS INDEX: 33.84 KG/M2 | SYSTOLIC BLOOD PRESSURE: 134 MMHG | DIASTOLIC BLOOD PRESSURE: 90 MMHG

## 2023-06-14 DIAGNOSIS — Z30.09 ENCOUNTER FOR OTHER GENERAL COUNSELING AND ADVICE ON CONTRACEPTION: ICD-10-CM

## 2023-06-14 DIAGNOSIS — N83.6 HEMATOSALPINX: ICD-10-CM

## 2023-06-14 PROCEDURE — 99214 OFFICE O/P EST MOD 30 MIN: CPT

## 2023-06-14 NOTE — DISCUSSION/SUMMARY
[FreeTextEntry1] : Pt w/L-sided pelvic pain, found to have 5cm hematosalpinx and possible peritoneal inclusion cyst.  Also desiring permanent sterilization.\par \par Discussed options including changing birth control, partner vasectomy, as well as permanent sterilization.  Patient opts for removal of fallopian tubes.  Does not desire future pregnancy.  Explained that this is irreversible.  Discussed method of sterilization as bilateral salpingectomy with additional benefit of decreasing risk of primary peritoneal cancer.  Discussed R/B/A of surgery.  Consents signed, will book for laparoscopic bilateral salpingectomy.\par

## 2023-06-23 ENCOUNTER — INPATIENT (INPATIENT)
Facility: HOSPITAL | Age: 33
LOS: 4 days | Discharge: HOME | DRG: 812 | End: 2023-06-28
Attending: STUDENT IN AN ORGANIZED HEALTH CARE EDUCATION/TRAINING PROGRAM | Admitting: STUDENT IN AN ORGANIZED HEALTH CARE EDUCATION/TRAINING PROGRAM
Payer: COMMERCIAL

## 2023-06-23 VITALS
HEART RATE: 118 BPM | SYSTOLIC BLOOD PRESSURE: 138 MMHG | TEMPERATURE: 99 F | OXYGEN SATURATION: 100 % | HEIGHT: 63 IN | RESPIRATION RATE: 22 BRPM | WEIGHT: 194.01 LBS | DIASTOLIC BLOOD PRESSURE: 76 MMHG

## 2023-06-23 DIAGNOSIS — Z98.89 OTHER SPECIFIED POSTPROCEDURAL STATES: Chronic | ICD-10-CM

## 2023-06-23 DIAGNOSIS — Z98.890 OTHER SPECIFIED POSTPROCEDURAL STATES: Chronic | ICD-10-CM

## 2023-06-23 LAB
ALBUMIN SERPL ELPH-MCNC: 2.9 G/DL — LOW (ref 3.3–5)
ALP SERPL-CCNC: 63 U/L — SIGNIFICANT CHANGE UP (ref 40–120)
ALT FLD-CCNC: 65 U/L — SIGNIFICANT CHANGE UP (ref 12–78)
ANION GAP SERPL CALC-SCNC: 5 MMOL/L — SIGNIFICANT CHANGE UP (ref 5–17)
APTT BLD: 28.2 SEC — SIGNIFICANT CHANGE UP (ref 27.5–35.5)
AST SERPL-CCNC: 95 U/L — HIGH (ref 15–37)
BASOPHILS # BLD AUTO: 0.1 K/UL — SIGNIFICANT CHANGE UP (ref 0–0.2)
BASOPHILS NFR BLD AUTO: 1.3 % — SIGNIFICANT CHANGE UP (ref 0–2)
BILIRUB SERPL-MCNC: 0.5 MG/DL — SIGNIFICANT CHANGE UP (ref 0.2–1.2)
BUN SERPL-MCNC: 8 MG/DL — SIGNIFICANT CHANGE UP (ref 7–23)
CALCIUM SERPL-MCNC: 8.6 MG/DL — SIGNIFICANT CHANGE UP (ref 8.5–10.1)
CHLORIDE SERPL-SCNC: 107 MMOL/L — SIGNIFICANT CHANGE UP (ref 96–108)
CO2 SERPL-SCNC: 27 MMOL/L — SIGNIFICANT CHANGE UP (ref 22–31)
CREAT SERPL-MCNC: 0.58 MG/DL — SIGNIFICANT CHANGE UP (ref 0.5–1.3)
EGFR: 122 ML/MIN/1.73M2 — SIGNIFICANT CHANGE UP
EOSINOPHIL # BLD AUTO: 0.33 K/UL — SIGNIFICANT CHANGE UP (ref 0–0.5)
EOSINOPHIL NFR BLD AUTO: 4.2 % — SIGNIFICANT CHANGE UP (ref 0–6)
GLUCOSE SERPL-MCNC: 111 MG/DL — HIGH (ref 70–99)
HCG SERPL-ACNC: <1 MIU/ML — SIGNIFICANT CHANGE UP
HCT VFR BLD CALC: 20.1 % — CRITICAL LOW (ref 34.5–45)
HGB BLD-MCNC: 6.3 G/DL — CRITICAL LOW (ref 11.5–15.5)
IMM GRANULOCYTES NFR BLD AUTO: 0.8 % — SIGNIFICANT CHANGE UP (ref 0–0.9)
INR BLD: 0.99 RATIO — SIGNIFICANT CHANGE UP (ref 0.88–1.16)
LACTATE SERPL-SCNC: 0.9 MMOL/L — SIGNIFICANT CHANGE UP (ref 0.7–2)
LYMPHOCYTES # BLD AUTO: 2.44 K/UL — SIGNIFICANT CHANGE UP (ref 1–3.3)
LYMPHOCYTES # BLD AUTO: 30.7 % — SIGNIFICANT CHANGE UP (ref 13–44)
MCHC RBC-ENTMCNC: 28.3 PG — SIGNIFICANT CHANGE UP (ref 27–34)
MCHC RBC-ENTMCNC: 31.3 GM/DL — LOW (ref 32–36)
MCV RBC AUTO: 90.1 FL — SIGNIFICANT CHANGE UP (ref 80–100)
MONOCYTES # BLD AUTO: 0.62 K/UL — SIGNIFICANT CHANGE UP (ref 0–0.9)
MONOCYTES NFR BLD AUTO: 7.8 % — SIGNIFICANT CHANGE UP (ref 2–14)
NEUTROPHILS # BLD AUTO: 4.39 K/UL — SIGNIFICANT CHANGE UP (ref 1.8–7.4)
NEUTROPHILS NFR BLD AUTO: 55.2 % — SIGNIFICANT CHANGE UP (ref 43–77)
NRBC # BLD: 0 /100 WBCS — SIGNIFICANT CHANGE UP (ref 0–0)
PLATELET # BLD AUTO: 230 K/UL — SIGNIFICANT CHANGE UP (ref 150–400)
POTASSIUM SERPL-MCNC: 3.7 MMOL/L — SIGNIFICANT CHANGE UP (ref 3.5–5.3)
POTASSIUM SERPL-SCNC: 3.7 MMOL/L — SIGNIFICANT CHANGE UP (ref 3.5–5.3)
PROT SERPL-MCNC: 6.4 G/DL — SIGNIFICANT CHANGE UP (ref 6–8.3)
PROTHROM AB SERPL-ACNC: 11.6 SEC — SIGNIFICANT CHANGE UP (ref 10.5–13.4)
RBC # BLD: 2.23 M/UL — LOW (ref 3.8–5.2)
RBC # FLD: 14.6 % — HIGH (ref 10.3–14.5)
SARS-COV-2 RNA SPEC QL NAA+PROBE: SIGNIFICANT CHANGE UP
SODIUM SERPL-SCNC: 139 MMOL/L — SIGNIFICANT CHANGE UP (ref 135–145)
WBC # BLD: 7.94 K/UL — SIGNIFICANT CHANGE UP (ref 3.8–10.5)
WBC # FLD AUTO: 7.94 K/UL — SIGNIFICANT CHANGE UP (ref 3.8–10.5)

## 2023-06-23 PROCEDURE — 93010 ELECTROCARDIOGRAM REPORT: CPT

## 2023-06-23 PROCEDURE — 74177 CT ABD & PELVIS W/CONTRAST: CPT | Mod: 26,MA

## 2023-06-23 PROCEDURE — 71275 CT ANGIOGRAPHY CHEST: CPT | Mod: 26,MA

## 2023-06-23 PROCEDURE — 99285 EMERGENCY DEPT VISIT HI MDM: CPT

## 2023-06-23 RX ORDER — ONDANSETRON 8 MG/1
4 TABLET, FILM COATED ORAL ONCE
Refills: 0 | Status: COMPLETED | OUTPATIENT
Start: 2023-06-23 | End: 2023-06-23

## 2023-06-23 RX ORDER — SODIUM CHLORIDE 9 MG/ML
1000 INJECTION INTRAMUSCULAR; INTRAVENOUS; SUBCUTANEOUS ONCE
Refills: 0 | Status: COMPLETED | OUTPATIENT
Start: 2023-06-23 | End: 2023-06-23

## 2023-06-23 RX ORDER — MORPHINE SULFATE 50 MG/1
4 CAPSULE, EXTENDED RELEASE ORAL ONCE
Refills: 0 | Status: DISCONTINUED | OUTPATIENT
Start: 2023-06-23 | End: 2023-06-23

## 2023-06-23 RX ADMIN — MORPHINE SULFATE 4 MILLIGRAM(S): 50 CAPSULE, EXTENDED RELEASE ORAL at 23:10

## 2023-06-23 RX ADMIN — MORPHINE SULFATE 4 MILLIGRAM(S): 50 CAPSULE, EXTENDED RELEASE ORAL at 22:47

## 2023-06-23 RX ADMIN — ONDANSETRON 4 MILLIGRAM(S): 8 TABLET, FILM COATED ORAL at 22:47

## 2023-06-23 RX ADMIN — SODIUM CHLORIDE 1000 MILLILITER(S): 9 INJECTION INTRAMUSCULAR; INTRAVENOUS; SUBCUTANEOUS at 22:37

## 2023-06-23 NOTE — ED PROVIDER NOTE - OBJECTIVE STATEMENT
32-year-old female history of hypertension on hydralazine status post liposuction at the [facility] with Dr. Stinson with fat transfer to bilateral breasts on June 20.  Now complaining about 1 day of right-sided rib back pain worse with deep breaths.  Noted to have a low-grade temp of 99.2.  Has been taking Tylenol and oxycodone for pain.

## 2023-06-23 NOTE — ED ADULT TRIAGE NOTE - CHIEF COMPLAINT QUOTE
Patient to ER and placed in wheelchair. Patient states she feels weak and is having chest pain.  Patient brought immediately to T2 for EKG.  Vitals taken with fever noted.  99.2 oral.  June 20 patient had liposuction with fat inserted in her breast. Patient to ER and placed in wheelchair. Patient states she feels weak and is having chest pain.  Patient brought immediately to T2 for EKG.  Vitals taken with fever noted.  99.2 oral.  June 20 patient had liposuction with fat inserted in her breast. Surgery preformed by DR. Torres.

## 2023-06-23 NOTE — ED PROVIDER NOTE - CLINICAL SUMMARY MEDICAL DECISION MAKING FREE TEXT BOX
32-year-old female history of hypertension on hydralazine status post liposuction at the [facility] with Dr. Stinson with fat transfer to bilateral breasts on June 20.  Now complaining about 1 day of right-sided rib back pain worse with deep breaths.  Noted to have a low-grade temp of 99.2.  Has been taking Tylenol and oxycodone for pain.    r/o PE, r/o infection, labs, CTA, r/o sepsis, IV analgesia

## 2023-06-24 DIAGNOSIS — R93.89 ABNORMAL FINDINGS ON DIAGNOSTIC IMAGING OF OTHER SPECIFIED BODY STRUCTURES: ICD-10-CM

## 2023-06-24 DIAGNOSIS — I10 ESSENTIAL (PRIMARY) HYPERTENSION: ICD-10-CM

## 2023-06-24 DIAGNOSIS — D64.9 ANEMIA, UNSPECIFIED: ICD-10-CM

## 2023-06-24 DIAGNOSIS — N83.6 HEMATOSALPINX: ICD-10-CM

## 2023-06-24 DIAGNOSIS — Z98.890 OTHER SPECIFIED POSTPROCEDURAL STATES: ICD-10-CM

## 2023-06-24 DIAGNOSIS — Z29.9 ENCOUNTER FOR PROPHYLACTIC MEASURES, UNSPECIFIED: ICD-10-CM

## 2023-06-24 LAB
ALBUMIN SERPL ELPH-MCNC: 2.6 G/DL — LOW (ref 3.3–5)
ALP SERPL-CCNC: 55 U/L — SIGNIFICANT CHANGE UP (ref 40–120)
ALT FLD-CCNC: 59 U/L — SIGNIFICANT CHANGE UP (ref 12–78)
ANION GAP SERPL CALC-SCNC: 3 MMOL/L — LOW (ref 5–17)
APPEARANCE UR: CLEAR — SIGNIFICANT CHANGE UP
AST SERPL-CCNC: 67 U/L — HIGH (ref 15–37)
BILIRUB SERPL-MCNC: 0.6 MG/DL — SIGNIFICANT CHANGE UP (ref 0.2–1.2)
BILIRUB UR-MCNC: NEGATIVE — SIGNIFICANT CHANGE UP
BUN SERPL-MCNC: 7 MG/DL — SIGNIFICANT CHANGE UP (ref 7–23)
CALCIUM SERPL-MCNC: 7.9 MG/DL — LOW (ref 8.5–10.1)
CHLORIDE SERPL-SCNC: 112 MMOL/L — HIGH (ref 96–108)
CK MB BLD-MCNC: 0.1 % — SIGNIFICANT CHANGE UP (ref 0–3.5)
CK MB CFR SERPL CALC: 1.5 NG/ML — SIGNIFICANT CHANGE UP (ref 0–3.6)
CK SERPL-CCNC: 2426 U/L — HIGH (ref 26–192)
CK SERPL-CCNC: 2801 U/L — HIGH (ref 26–192)
CO2 SERPL-SCNC: 27 MMOL/L — SIGNIFICANT CHANGE UP (ref 22–31)
COLOR SPEC: ABNORMAL
CREAT SERPL-MCNC: 0.5 MG/DL — SIGNIFICANT CHANGE UP (ref 0.5–1.3)
DIFF PNL FLD: ABNORMAL
EGFR: 126 ML/MIN/1.73M2 — SIGNIFICANT CHANGE UP
GLUCOSE SERPL-MCNC: 112 MG/DL — HIGH (ref 70–99)
GLUCOSE UR QL: NEGATIVE MG/DL — SIGNIFICANT CHANGE UP
HCT VFR BLD CALC: 25.2 % — LOW (ref 34.5–45)
HGB BLD-MCNC: 8.1 G/DL — LOW (ref 11.5–15.5)
KETONES UR-MCNC: NEGATIVE MG/DL — SIGNIFICANT CHANGE UP
LEUKOCYTE ESTERASE UR-ACNC: ABNORMAL
MAGNESIUM SERPL-MCNC: 2.2 MG/DL — SIGNIFICANT CHANGE UP (ref 1.6–2.6)
MCHC RBC-ENTMCNC: 27.8 PG — SIGNIFICANT CHANGE UP (ref 27–34)
MCHC RBC-ENTMCNC: 32.1 GM/DL — SIGNIFICANT CHANGE UP (ref 32–36)
MCV RBC AUTO: 86.6 FL — SIGNIFICANT CHANGE UP (ref 80–100)
NITRITE UR-MCNC: NEGATIVE — SIGNIFICANT CHANGE UP
NRBC # BLD: 0 /100 WBCS — SIGNIFICANT CHANGE UP (ref 0–0)
PH UR: 7 — SIGNIFICANT CHANGE UP (ref 5–8)
PHOSPHATE SERPL-MCNC: 2.5 MG/DL — SIGNIFICANT CHANGE UP (ref 2.5–4.5)
PLATELET # BLD AUTO: 252 K/UL — SIGNIFICANT CHANGE UP (ref 150–400)
POTASSIUM SERPL-MCNC: 3.8 MMOL/L — SIGNIFICANT CHANGE UP (ref 3.5–5.3)
POTASSIUM SERPL-SCNC: 3.8 MMOL/L — SIGNIFICANT CHANGE UP (ref 3.5–5.3)
PROT SERPL-MCNC: 6.3 G/DL — SIGNIFICANT CHANGE UP (ref 6–8.3)
PROT UR-MCNC: 30 MG/DL
RBC # BLD: 2.91 M/UL — LOW (ref 3.8–5.2)
RBC # FLD: 16.1 % — HIGH (ref 10.3–14.5)
SODIUM SERPL-SCNC: 142 MMOL/L — SIGNIFICANT CHANGE UP (ref 135–145)
SP GR SPEC: 1.02 — SIGNIFICANT CHANGE UP (ref 1–1.03)
TROPONIN I, HIGH SENSITIVITY RESULT: 5.2 NG/L — SIGNIFICANT CHANGE UP
UROBILINOGEN FLD QL: 1 MG/DL — SIGNIFICANT CHANGE UP (ref 0.2–1)
WBC # BLD: 8.19 K/UL — SIGNIFICANT CHANGE UP (ref 3.8–10.5)
WBC # FLD AUTO: 8.19 K/UL — SIGNIFICANT CHANGE UP (ref 3.8–10.5)

## 2023-06-24 PROCEDURE — 12345: CPT | Mod: NC,GC

## 2023-06-24 PROCEDURE — 93010 ELECTROCARDIOGRAM REPORT: CPT

## 2023-06-24 PROCEDURE — 99222 1ST HOSP IP/OBS MODERATE 55: CPT | Mod: GC

## 2023-06-24 RX ORDER — CYCLOBENZAPRINE HYDROCHLORIDE 10 MG/1
5 TABLET, FILM COATED ORAL ONCE
Refills: 0 | Status: COMPLETED | OUTPATIENT
Start: 2023-06-24 | End: 2023-06-24

## 2023-06-24 RX ORDER — POLYETHYLENE GLYCOL 3350 17 G/17G
17 POWDER, FOR SOLUTION ORAL DAILY
Refills: 0 | Status: DISCONTINUED | OUTPATIENT
Start: 2023-06-24 | End: 2023-06-28

## 2023-06-24 RX ORDER — NALOXONE HYDROCHLORIDE 4 MG/.1ML
0.4 SPRAY NASAL ONCE
Refills: 0 | Status: DISCONTINUED | OUTPATIENT
Start: 2023-06-24 | End: 2023-06-28

## 2023-06-24 RX ORDER — OXYCODONE HYDROCHLORIDE 5 MG/1
5 TABLET ORAL EVERY 6 HOURS
Refills: 0 | Status: DISCONTINUED | OUTPATIENT
Start: 2023-06-24 | End: 2023-06-28

## 2023-06-24 RX ORDER — PANTOPRAZOLE SODIUM 20 MG/1
40 TABLET, DELAYED RELEASE ORAL
Refills: 0 | Status: DISCONTINUED | OUTPATIENT
Start: 2023-06-24 | End: 2023-06-28

## 2023-06-24 RX ORDER — SENNA PLUS 8.6 MG/1
2 TABLET ORAL AT BEDTIME
Refills: 0 | Status: DISCONTINUED | OUTPATIENT
Start: 2023-06-24 | End: 2023-06-28

## 2023-06-24 RX ORDER — HYDROMORPHONE HYDROCHLORIDE 2 MG/ML
0.5 INJECTION INTRAMUSCULAR; INTRAVENOUS; SUBCUTANEOUS EVERY 4 HOURS
Refills: 0 | Status: DISCONTINUED | OUTPATIENT
Start: 2023-06-24 | End: 2023-06-24

## 2023-06-24 RX ORDER — LIDOCAINE 4 G/100G
1 CREAM TOPICAL DAILY
Refills: 0 | Status: DISCONTINUED | OUTPATIENT
Start: 2023-06-24 | End: 2023-06-28

## 2023-06-24 RX ORDER — LANOLIN ALCOHOL/MO/W.PET/CERES
3 CREAM (GRAM) TOPICAL AT BEDTIME
Refills: 0 | Status: DISCONTINUED | OUTPATIENT
Start: 2023-06-24 | End: 2023-06-28

## 2023-06-24 RX ORDER — OXYCODONE HYDROCHLORIDE 5 MG/1
5 TABLET ORAL ONCE
Refills: 0 | Status: DISCONTINUED | OUTPATIENT
Start: 2023-06-24 | End: 2023-06-24

## 2023-06-24 RX ORDER — ACETAMINOPHEN 500 MG
650 TABLET ORAL EVERY 6 HOURS
Refills: 0 | Status: DISCONTINUED | OUTPATIENT
Start: 2023-06-24 | End: 2023-06-28

## 2023-06-24 RX ORDER — HYDRALAZINE HCL 50 MG
50 TABLET ORAL THREE TIMES A DAY
Refills: 0 | Status: DISCONTINUED | OUTPATIENT
Start: 2023-06-24 | End: 2023-06-25

## 2023-06-24 RX ORDER — CEPHALEXIN 500 MG
1 CAPSULE ORAL
Refills: 0 | DISCHARGE

## 2023-06-24 RX ORDER — OXYCODONE HYDROCHLORIDE 5 MG/1
10 TABLET ORAL EVERY 6 HOURS
Refills: 0 | Status: DISCONTINUED | OUTPATIENT
Start: 2023-06-24 | End: 2023-06-28

## 2023-06-24 RX ORDER — ONDANSETRON 8 MG/1
4 TABLET, FILM COATED ORAL EVERY 8 HOURS
Refills: 0 | Status: DISCONTINUED | OUTPATIENT
Start: 2023-06-24 | End: 2023-06-28

## 2023-06-24 RX ORDER — SIMETHICONE 80 MG/1
80 TABLET, CHEWABLE ORAL
Refills: 0 | Status: DISCONTINUED | OUTPATIENT
Start: 2023-06-24 | End: 2023-06-28

## 2023-06-24 RX ORDER — HYDROMORPHONE HYDROCHLORIDE 2 MG/ML
0.25 INJECTION INTRAMUSCULAR; INTRAVENOUS; SUBCUTANEOUS EVERY 4 HOURS
Refills: 0 | Status: DISCONTINUED | OUTPATIENT
Start: 2023-06-24 | End: 2023-06-24

## 2023-06-24 RX ORDER — CEPHALEXIN 500 MG
500 CAPSULE ORAL
Refills: 0 | Status: DISCONTINUED | OUTPATIENT
Start: 2023-06-24 | End: 2023-06-28

## 2023-06-24 RX ADMIN — LIDOCAINE 1 PATCH: 4 CREAM TOPICAL at 21:24

## 2023-06-24 RX ADMIN — CYCLOBENZAPRINE HYDROCHLORIDE 5 MILLIGRAM(S): 10 TABLET, FILM COATED ORAL at 23:39

## 2023-06-24 RX ADMIN — HYDROMORPHONE HYDROCHLORIDE 0.5 MILLIGRAM(S): 2 INJECTION INTRAMUSCULAR; INTRAVENOUS; SUBCUTANEOUS at 08:06

## 2023-06-24 RX ADMIN — SENNA PLUS 2 TABLET(S): 8.6 TABLET ORAL at 21:18

## 2023-06-24 RX ADMIN — Medication 50 MILLIGRAM(S): at 21:18

## 2023-06-24 RX ADMIN — OXYCODONE HYDROCHLORIDE 10 MILLIGRAM(S): 5 TABLET ORAL at 21:14

## 2023-06-24 RX ADMIN — Medication 500 MILLIGRAM(S): at 09:14

## 2023-06-24 RX ADMIN — Medication 3 MILLIGRAM(S): at 21:54

## 2023-06-24 RX ADMIN — Medication 650 MILLIGRAM(S): at 02:47

## 2023-06-24 RX ADMIN — Medication 500 MILLIGRAM(S): at 23:26

## 2023-06-24 RX ADMIN — POLYETHYLENE GLYCOL 3350 17 GRAM(S): 17 POWDER, FOR SOLUTION ORAL at 14:20

## 2023-06-24 RX ADMIN — LIDOCAINE 1 PATCH: 4 CREAM TOPICAL at 09:15

## 2023-06-24 RX ADMIN — Medication 500 MILLIGRAM(S): at 18:00

## 2023-06-24 RX ADMIN — Medication 50 MILLIGRAM(S): at 06:52

## 2023-06-24 RX ADMIN — Medication 30 MILLILITER(S): at 13:44

## 2023-06-24 RX ADMIN — OXYCODONE HYDROCHLORIDE 5 MILLIGRAM(S): 5 TABLET ORAL at 19:01

## 2023-06-24 RX ADMIN — HYDROMORPHONE HYDROCHLORIDE 0.5 MILLIGRAM(S): 2 INJECTION INTRAMUSCULAR; INTRAVENOUS; SUBCUTANEOUS at 08:25

## 2023-06-24 RX ADMIN — OXYCODONE HYDROCHLORIDE 5 MILLIGRAM(S): 5 TABLET ORAL at 18:02

## 2023-06-24 RX ADMIN — SODIUM CHLORIDE 1000 MILLILITER(S): 9 INJECTION INTRAMUSCULAR; INTRAVENOUS; SUBCUTANEOUS at 01:07

## 2023-06-24 RX ADMIN — OXYCODONE HYDROCHLORIDE 10 MILLIGRAM(S): 5 TABLET ORAL at 20:15

## 2023-06-24 RX ADMIN — Medication 500 MILLIGRAM(S): at 14:20

## 2023-06-24 NOTE — H&P ADULT - PROBLEM SELECTOR PLAN 1
Likely iron deficiency anemia in the setting of blood loss s/p surgical procedure and now with vaginal bleeding in the setting of known hematosalpinx.  - Hgb 6.3 on admission, patient states was 12.5 prior to surgery -> ~8 after surgery  - Currently hemodynamically stable, monitor for signs and symptoms of active bleeding  - currently receiving 2U pRBC, f/u repeat Hgb after completion of transfusions.  - Blood transfusion consent signed and placed in chart  - Maintain active type and screen  - F/u folate, vitamin B12, TSH  - F/u routine cbcs.

## 2023-06-24 NOTE — H&P ADULT - ATTENDING COMMENTS
32y female with history of HTN, subclinical hyperthyroidism (likely 2/2 ab negative Graves disease), anemia and thyroid nodules, hematosalpinx, known complex renal cysts presents with chest and back pain, admitted for symptomatic anemia. Admit to medicine. Transfuse 2U PRBC. Monitor response. Watch for further bleeding. Outpatient follow up for incidental CT findings.    Agree with H&P as outlined above, edited where appropriate.

## 2023-06-24 NOTE — PATIENT PROFILE ADULT - FALL HARM RISK - HARM RISK INTERVENTIONS

## 2023-06-24 NOTE — ED ADULT NURSE NOTE - NSICDXPASTMEDICALHX_GEN_ALL_CORE_FT
PAST MEDICAL HISTORY:  Asthma mild no intubations    Hypertension     Hypertension in pregnancy, preeclampsia

## 2023-06-24 NOTE — H&P ADULT - HISTORY OF PRESENT ILLNESS
ED course:  vitals - hr 98, bp 117/79, rr 20, t 99.2, spo2 98  labs - hgb 6.3  imaging - CTA / CTAP: No pulmonary embolism.    Post surgical changes in the chest and abdominal wall. No drainable fluid   collection identified.    Right lower lobe nodules measuring up to 1.2 cm. Follow-up chest CT in 3   months is recommended.  EKG - sinus tach 114 no ischemic changes  meds given - NS bolus, zofran, morphine 4mg x1, prbc 2U   32 year old female with history of HTN, subclinical hyperthyroidism (likely 2/2 ab negative Graves disease), anemia and thyroid nodules, hematosalpinx, known complex renal cysts presents with chest and back pain. Patient is s/p liposuction at [facility] w/ Dr. Stinson with fat transfer to bilateral breasts on june 20th. She has been on a 10 day course of Keflex 500mg po QID since then for post-operative prophylaxis. She has been having some chest and back pain since the procedure but for the past one day she has been having severe right sided rib and back pain worse with deep breaths. She describes the pain as 20 out of 10 and that it feels like it "wiggles back and forth". Has been taking tylenol and oxycodone for pain at home. Patient states that she has a history of anemia in the past. Her Hgb was 12.5 before the surgery, and ~8.5 after the surgery, per pt. Patient notes that she has been having a days worth of abnormal vaginal bleeding, notes finding of hematosalpinx on recent MRI in may, and that she is to be scheduled for b/l salpingectomy. Feels that she may have had a fever. Had chest pain previously but is gone now. Denies abdominal pain, n/v/d, urinary changes, dizziness, headache.    ED course:  vitals - hr 98, bp 117/79, rr 20, t 99.2, spo2 98  labs - hgb 6.3  imaging - CTA / CTAP: No pulmonary embolism.    Post surgical changes in the chest and abdominal wall. No drainable fluid   collection identified.    Right lower lobe nodules measuring up to 1.2 cm. Follow-up chest CT in 3   months is recommended.  EKG - sinus tach 114 no ischemic changes  meds given - NS bolus, zofran, morphine 4mg x1, prbc 2U   32 year old female with history of HTN, subclinical hyperthyroidism (likely 2/2 ab negative Graves disease), anemia and thyroid nodules, hematosalpinx, known complex renal cysts presents with chest and back pain. Patient is s/p liposuction at [facility] w/ Dr. Stinson with fat transfer to bilateral breasts on june 20th. She has been on a 10 day course of Keflex 500mg po QID since then for post-operative prophylaxis. She has been having some chest and back pain since the procedure but for the past one day she has been having severe right sided rib and back pain worse with deep breaths. She describes the pain as 20 out of 10 and that it feels like it "wiggles back and forth". Has been taking tylenol and oxycodone for pain at home. Patient states that she has a history of anemia in the past. Her Hgb was 12.5 before the surgery, and ~8.5 after the surgery, per pt. Patient notes that she has been having a days worth of abnormal vaginal bleeding, notes finding of hematosalpinx on recent MRI in may, and that she is to be scheduled for b/l salpingectomy. Feels that she may have had a fever. Had chest pain previously but is gone now. Denies abdominal pain, n/v/d, urinary changes, dizziness, headache.    ED course:  vitals - hr 98, bp 117/79, rr 20, t 99.2, spo2 98  labs - hgb 6.3  imaging - CTA / CTAP: No pulmonary embolism. Post surgical changes in the chest and abdominal wall. No drainable fluid collection identified. Right lower lobe nodules measuring up to 1.2 cm. Follow-up chest CT in 3 months is recommended.  EKG - sinus tach 114 no ischemic changes  meds given - NS bolus, zofran, morphine 4mg x1, prbc 2U

## 2023-06-24 NOTE — H&P ADULT - PROBLEM SELECTOR PLAN 5
"Right lower lobe nodules measuring up to 1.2 cm. Follow-up chest CT in 3   months is recommended."  - outpatient follow-up "Right lower lobe nodules measuring up to 1.2 cm. Follow-up chest CT in 3 months is recommended."  - outpatient follow-up

## 2023-06-24 NOTE — ED ADULT NURSE NOTE - CHIEF COMPLAINT QUOTE
Patient to ER and placed in wheelchair. Patient states she feels weak and is having chest pain.  Patient brought immediately to T2 for EKG.  Vitals taken with fever noted.  99.2 oral.  June 20 patient had liposuction with fat inserted in her breast. Surgery preformed by DR. Torres.

## 2023-06-24 NOTE — H&P ADULT - NSICDXPASTMEDICALHX_GEN_ALL_CORE_FT
PAST MEDICAL HISTORY:  Anemia     Asthma mild no intubations    Hematosalpinx     Hypertension     Hypertension in pregnancy, preeclampsia     Subclinical hyperthyroidism

## 2023-06-24 NOTE — H&P ADULT - NSHPPHYSICALEXAM_GEN_ALL_CORE
T(C): 37.3 (06-24-23 @ 00:44), Max: 37.3 (06-23-23 @ 21:33)  HR: 98 (06-24-23 @ 00:44) (98 - 118)  BP: 117/79 (06-24-23 @ 00:44) (117/79 - 138/76)  RR: 20 (06-24-23 @ 00:44) (20 - 22)  SpO2: 98% (06-24-23 @ 00:44) (98% - 100%)    GENERAL: laying in bed appears to be in acute pain. conversant, pleasant  EYES: anicteric sclerae, no exudates  ENMT: oropharynx clear without erythema, no exudates, moist mucous membranes  NECK: supple, soft, no thyromegaly noted  LUNGS: clear to auscultation, no intercostal retractions  HEART: S1/S2, regular rate and rhythm, no murmurs noted, no lower extremity edema  GASTROINTESTINAL: abdomen is soft, nontender, nondistended, normoactive bowel sounds, no palpable masses  INTEGUMENT: good skin turgor, warm skin, appears well perfused  MUSCULOSKELETAL: +ttp upper back, no clubbing or cyanosis, no obvious deformity  NEUROLOGIC: awake, alert, oriented x3, good muscle tone in 4 extremities, no obvious sensory deficits  PSYCHIATRIC: mood is good, affect is congruent, linear and logical thought process  HEME/LYMPH: no palpable supraclavicular nodules, no obvious ecchymosis or petechiae T(C): 37.3 (06-24-23 @ 00:44), Max: 37.3 (06-23-23 @ 21:33)  HR: 98 (06-24-23 @ 00:44) (98 - 118)  BP: 117/79 (06-24-23 @ 00:44) (117/79 - 138/76)  RR: 20 (06-24-23 @ 00:44) (20 - 22)  SpO2: 98% (06-24-23 @ 00:44) (98% - 100%)    GENERAL: laying in bed appears to be in acute pain. conversant, pleasant  EYES: anicteric sclerae, no exudates  ENMT: oropharynx clear without erythema, no exudates, moist mucous membranes  NECK: supple, soft, no thyromegaly noted  LUNGS: clear to auscultation, no intercostal retractions  HEART: S1/S2, regular rate and rhythm, no murmurs noted, no lower extremity edema  GASTROINTESTINAL: abdomen is soft, nontender, nondistended, normoactive bowel sounds, no palpable masses  INTEGUMENT: good skin turgor, warm skin, appears well perfused  MUSCULOSKELETAL: +ttp upper back, no clubbing or cyanosis, no obvious deformity  NEUROLOGIC: awake, alert, oriented x3, good muscle tone in 4 extremities, no obvious sensory deficits  PSYCHIATRIC: normal affect  HEME/LYMPH: no palpable supraclavicular nodules, no obvious ecchymosis or petechiae

## 2023-06-24 NOTE — PATIENT PROFILE ADULT - SURGICAL SITE DESCRIPTION
6 surgical sites, under B/L breasts steri strips, SSX3 on B/L sacral area, mid back, and intergluteal crack w/ derrmabond surgical glue

## 2023-06-24 NOTE — PATIENT PROFILE ADULT - STATED REASON FOR ADMISSION
Taj Weber is a same day provider and does not follow patient's.  Sending to PCP to advise.   fevers

## 2023-06-24 NOTE — PATIENT PROFILE ADULT - MST SCORE
CERTIFICATE OF SCHOOL      03/01/22       Re:   Ruthie Bojorquez   [address] [facility] [address]                       This is to certify that Ruthie Bojorquez  has been under my care for management of her medical problems.  Kindly accommodate Ruthie  to do home schooling via [employer] for the period of 2/21/2022 - 3/21/2022.  She can return to school in-person from 3/21/2022.      Please contact my office if you have any questions or concerns.             Lukasz Schwarz MD  [address] [place] [address]  Telephone: [phone]  Fax: [phone]  
0

## 2023-06-24 NOTE — ED ADULT NURSE NOTE - CAS EDP DISCH DISPOSITION ADMI
RIGHT UPPER QUADRANT ULTRASOUND:

 

HISTORY:

Transaminitis. Enlarged liver on CT.

 

FINDINGS:

Diffuse, heterogeneous, coarse increased echogenicity throughout a slightly enlarged liver. The gallb
ladder is somewhat poorly defined. There are no overt gallstones, gallbladder wall thickening or juan
cholecystic fluid. The common bile duct is 0.3 cm. The visualized pancreas and right kidney are unrem
arkable. No right upper quadrant fluid collection.

 

IMPRESSION:

Hepatomegaly with coarse altered echogenicity, evidence for nonspecific hepatic parenchymal process. 
No common duct dilatation. No evidence for acute cholecystitis.

 

POS: [facility]

## 2023-06-24 NOTE — H&P ADULT - PROBLEM SELECTOR PLAN 3
Chronic, stable on admission  - Continue home medication hydralazine with hold params  - Monitor routine hemodynamics

## 2023-06-24 NOTE — H&P ADULT - PROBLEM SELECTOR PLAN 2
s/p liposuction, fat transfer on June 20th in [place]  - In significant pain w/ pleuritic component, s/p CTA  - CTA "No pulmonary embolism.  Post surgical changes in the chest and abdominal wall. No drainable fluid   collection identified."  - pain control w/ tylenol, dilaudid  - on Keflex 500 QID, continue for 6 more days

## 2023-06-24 NOTE — H&P ADULT - PROBLEM SELECTOR PLAN 4
pelvic MRI 5/13/23 notes left hematosalpinx, suspect contributory to current presentation  - CTAP w/ IV contrast demonstrates "REPRODUCTIVE ORGANS: Uterus and adnexa grossly within normal limits,   better evaluated on recent MRI."  - monitor for worsening vaginal bleeding  - transfusions as above

## 2023-06-24 NOTE — H&P ADULT - ASSESSMENT
32 year old female with history of HTN, subclinical hyperthyroidism (likely 2/2 ab negative Graves disease), anemia and thyroid nodules, hematosalpinx, known complex renal cysts presents with chest and back pain, admitted for symptomatic anemia. 32y female with history of HTN, subclinical hyperthyroidism (likely 2/2 ab negative Graves disease), anemia and thyroid nodules, hematosalpinx, known complex renal cysts presents with chest and back pain, admitted for symptomatic anemia.

## 2023-06-24 NOTE — H&P ADULT - NSHPREVIEWOFSYSTEMS_GEN_ALL_CORE
CONSTITUTIONAL: +subjective fever, +chills, denies fatigue, weakness  HEENT: denies sore throat  SKIN: denies rashes  CARDIOVASCULAR: +chest pain, +palpitations, denies chest pressure  RESPIRATORY: denies shortness of breath, sputum production  GASTROINTESTINAL: denies nausea, vomiting, diarrhea, abdominal pain  GENITOURINARY: +abnormal vaginal bleeding, denies dysuria,  NEUROLOGICAL: denies numbness, headache, focal weakness  MUSCULOSKELETAL: denies new joint pain, muscle aches  HEMATOLOGIC: + vaginal bleeding  LYMPHATICS: denies extremity swelling

## 2023-06-24 NOTE — ED ADULT NURSE NOTE - NSFALLRISKINTERV_ED_ALL_ED

## 2023-06-25 ENCOUNTER — TRANSCRIPTION ENCOUNTER (OUTPATIENT)
Age: 33
End: 2023-06-25

## 2023-06-25 DIAGNOSIS — R74.8 ABNORMAL LEVELS OF OTHER SERUM ENZYMES: ICD-10-CM

## 2023-06-25 DIAGNOSIS — R07.89 OTHER CHEST PAIN: ICD-10-CM

## 2023-06-25 LAB
ALBUMIN SERPL ELPH-MCNC: 2.5 G/DL — LOW (ref 3.3–5)
ALP SERPL-CCNC: 50 U/L — SIGNIFICANT CHANGE UP (ref 40–120)
ALT FLD-CCNC: 51 U/L — SIGNIFICANT CHANGE UP (ref 12–78)
ANION GAP SERPL CALC-SCNC: 6 MMOL/L — SIGNIFICANT CHANGE UP (ref 5–17)
AST SERPL-CCNC: 46 U/L — HIGH (ref 15–37)
BILIRUB SERPL-MCNC: 0.7 MG/DL — SIGNIFICANT CHANGE UP (ref 0.2–1.2)
BUN SERPL-MCNC: 13 MG/DL — SIGNIFICANT CHANGE UP (ref 7–23)
CALCIUM SERPL-MCNC: 8.1 MG/DL — LOW (ref 8.5–10.1)
CHLORIDE SERPL-SCNC: 107 MMOL/L — SIGNIFICANT CHANGE UP (ref 96–108)
CO2 SERPL-SCNC: 27 MMOL/L — SIGNIFICANT CHANGE UP (ref 22–31)
CREAT SERPL-MCNC: 0.54 MG/DL — SIGNIFICANT CHANGE UP (ref 0.5–1.3)
CULTURE RESULTS: SIGNIFICANT CHANGE UP
EGFR: 124 ML/MIN/1.73M2 — SIGNIFICANT CHANGE UP
FOLATE SERPL-MCNC: 12 NG/ML — SIGNIFICANT CHANGE UP
GLUCOSE SERPL-MCNC: 92 MG/DL — SIGNIFICANT CHANGE UP (ref 70–99)
HCT VFR BLD CALC: 23.7 % — LOW (ref 34.5–45)
HCT VFR BLD CALC: 29.6 % — LOW (ref 34.5–45)
HGB BLD-MCNC: 7.4 G/DL — LOW (ref 11.5–15.5)
HGB BLD-MCNC: 9.7 G/DL — LOW (ref 11.5–15.5)
MCHC RBC-ENTMCNC: 27.6 PG — SIGNIFICANT CHANGE UP (ref 27–34)
MCHC RBC-ENTMCNC: 28.6 PG — SIGNIFICANT CHANGE UP (ref 27–34)
MCHC RBC-ENTMCNC: 31.2 GM/DL — LOW (ref 32–36)
MCHC RBC-ENTMCNC: 32.8 GM/DL — SIGNIFICANT CHANGE UP (ref 32–36)
MCV RBC AUTO: 87.3 FL — SIGNIFICANT CHANGE UP (ref 80–100)
MCV RBC AUTO: 88.4 FL — SIGNIFICANT CHANGE UP (ref 80–100)
NRBC # BLD: 0 /100 WBCS — SIGNIFICANT CHANGE UP (ref 0–0)
NRBC # BLD: 0 /100 WBCS — SIGNIFICANT CHANGE UP (ref 0–0)
PLATELET # BLD AUTO: 293 K/UL — SIGNIFICANT CHANGE UP (ref 150–400)
PLATELET # BLD AUTO: 311 K/UL — SIGNIFICANT CHANGE UP (ref 150–400)
POTASSIUM SERPL-MCNC: 3.8 MMOL/L — SIGNIFICANT CHANGE UP (ref 3.5–5.3)
POTASSIUM SERPL-SCNC: 3.8 MMOL/L — SIGNIFICANT CHANGE UP (ref 3.5–5.3)
PROT SERPL-MCNC: 6.1 G/DL — SIGNIFICANT CHANGE UP (ref 6–8.3)
RBC # BLD: 2.68 M/UL — LOW (ref 3.8–5.2)
RBC # BLD: 3.39 M/UL — LOW (ref 3.8–5.2)
RBC # FLD: 15.9 % — HIGH (ref 10.3–14.5)
RBC # FLD: 16.1 % — HIGH (ref 10.3–14.5)
SODIUM SERPL-SCNC: 140 MMOL/L — SIGNIFICANT CHANGE UP (ref 135–145)
SPECIMEN SOURCE: SIGNIFICANT CHANGE UP
TSH SERPL-MCNC: 3.28 UIU/ML — SIGNIFICANT CHANGE UP (ref 0.36–3.74)
VIT B12 SERPL-MCNC: >2000 PG/ML — HIGH (ref 232–1245)
WBC # BLD: 6.77 K/UL — SIGNIFICANT CHANGE UP (ref 3.8–10.5)
WBC # BLD: 7.28 K/UL — SIGNIFICANT CHANGE UP (ref 3.8–10.5)
WBC # FLD AUTO: 6.77 K/UL — SIGNIFICANT CHANGE UP (ref 3.8–10.5)
WBC # FLD AUTO: 7.28 K/UL — SIGNIFICANT CHANGE UP (ref 3.8–10.5)

## 2023-06-25 PROCEDURE — 99233 SBSQ HOSP IP/OBS HIGH 50: CPT | Mod: GC

## 2023-06-25 RX ORDER — BACITRACIN ZINC 500 UNIT/G
1 OINTMENT IN PACKET (EA) TOPICAL
Refills: 0 | Status: DISCONTINUED | OUTPATIENT
Start: 2023-06-25 | End: 2023-06-28

## 2023-06-25 RX ORDER — GABAPENTIN 400 MG/1
100 CAPSULE ORAL THREE TIMES A DAY
Refills: 0 | Status: DISCONTINUED | OUTPATIENT
Start: 2023-06-25 | End: 2023-06-28

## 2023-06-25 RX ORDER — ACETAMINOPHEN 500 MG
1000 TABLET ORAL ONCE
Refills: 0 | Status: COMPLETED | OUTPATIENT
Start: 2023-06-25 | End: 2023-06-25

## 2023-06-25 RX ORDER — DEXTROSE MONOHYDRATE, SODIUM CHLORIDE, AND POTASSIUM CHLORIDE 50; .745; 4.5 G/1000ML; G/1000ML; G/1000ML
1000 INJECTION, SOLUTION INTRAVENOUS
Refills: 0 | Status: DISCONTINUED | OUTPATIENT
Start: 2023-06-25 | End: 2023-06-26

## 2023-06-25 RX ORDER — GABAPENTIN 400 MG/1
300 CAPSULE ORAL ONCE
Refills: 0 | Status: COMPLETED | OUTPATIENT
Start: 2023-06-25 | End: 2023-06-25

## 2023-06-25 RX ADMIN — OXYCODONE HYDROCHLORIDE 10 MILLIGRAM(S): 5 TABLET ORAL at 08:42

## 2023-06-25 RX ADMIN — Medication 500 MILLIGRAM(S): at 23:07

## 2023-06-25 RX ADMIN — SENNA PLUS 2 TABLET(S): 8.6 TABLET ORAL at 21:40

## 2023-06-25 RX ADMIN — GABAPENTIN 100 MILLIGRAM(S): 400 CAPSULE ORAL at 19:27

## 2023-06-25 RX ADMIN — Medication 500 MILLIGRAM(S): at 11:03

## 2023-06-25 RX ADMIN — Medication 1 APPLICATION(S): at 17:33

## 2023-06-25 RX ADMIN — PANTOPRAZOLE SODIUM 40 MILLIGRAM(S): 20 TABLET, DELAYED RELEASE ORAL at 06:39

## 2023-06-25 RX ADMIN — OXYCODONE HYDROCHLORIDE 10 MILLIGRAM(S): 5 TABLET ORAL at 09:45

## 2023-06-25 RX ADMIN — Medication 50 MILLIGRAM(S): at 06:39

## 2023-06-25 RX ADMIN — GABAPENTIN 300 MILLIGRAM(S): 400 CAPSULE ORAL at 08:42

## 2023-06-25 RX ADMIN — OXYCODONE HYDROCHLORIDE 10 MILLIGRAM(S): 5 TABLET ORAL at 02:16

## 2023-06-25 RX ADMIN — GABAPENTIN 100 MILLIGRAM(S): 400 CAPSULE ORAL at 21:40

## 2023-06-25 RX ADMIN — Medication 500 MILLIGRAM(S): at 06:39

## 2023-06-25 RX ADMIN — Medication 1000 MILLIGRAM(S): at 09:45

## 2023-06-25 RX ADMIN — POLYETHYLENE GLYCOL 3350 17 GRAM(S): 17 POWDER, FOR SOLUTION ORAL at 11:04

## 2023-06-25 RX ADMIN — Medication 500 MILLIGRAM(S): at 17:33

## 2023-06-25 RX ADMIN — OXYCODONE HYDROCHLORIDE 10 MILLIGRAM(S): 5 TABLET ORAL at 17:40

## 2023-06-25 RX ADMIN — OXYCODONE HYDROCHLORIDE 10 MILLIGRAM(S): 5 TABLET ORAL at 02:52

## 2023-06-25 RX ADMIN — OXYCODONE HYDROCHLORIDE 10 MILLIGRAM(S): 5 TABLET ORAL at 23:07

## 2023-06-25 RX ADMIN — Medication 1000 MILLIGRAM(S): at 08:42

## 2023-06-25 RX ADMIN — OXYCODONE HYDROCHLORIDE 10 MILLIGRAM(S): 5 TABLET ORAL at 16:37

## 2023-06-25 NOTE — PROGRESS NOTE ADULT - PROBLEM SELECTOR PLAN 2
Pt p/w chest and back pain, ?pleuritic component  - troponin negative 6/24  - ekg 6/24 without acute ischemic changes  - CTA negative for pulmonary embolism on admission  - patient educated on importance of scd compliance and ambulatio given she is post op  - pain regimen: tylenol, oxycodone 5mg po q6hrs mod pain, oxycodone 10mg po q6hrs severe pain  - add gabapentin 300mg x 1 now, gabapentin 100mg tid on 6/25/23 PM, standing order Pt p/w chest and back pain, ?pleuritic component  - troponin negative 6/24  - ekg 6/24 without acute ischemic changes  - CTA negative for pulmonary embolism on admission  - patient educated on importance of scd compliance given she is post op  - pain regimen: tylenol, oxycodone 5mg po q6hrs mod pain, oxycodone 10mg po q6hrs severe pain  - add gabapentin 300mg x 1 now, gabapentin 100mg tid on 6/25/23 PM, standing order

## 2023-06-25 NOTE — PROGRESS NOTE ADULT - SUBJECTIVE AND OBJECTIVE BOX
Patient is a 32y old  Female who presents with a chief complaint of symptomatic anemia (25 Jun 2023 11:00)    INTERVAL HPI/OVERNIGHT EVENTS: Patient c/o R > L back pain, received flexeril x 1 dose as ordered by night team. Initially this morning on encounter, patient appearing severely distressed in pain. States she has severe R shoulder and back pain. Patient requesting medication. Last received oxycodone 10mg around 02:00 on 6/25/23. Pt seen following administration of addition pain medication this morning with improvement in pain and much more interactive at that time.    MEDICATIONS  (STANDING):  cephalexin 500 milliGRAM(s) Oral four times a day  gabapentin 100 milliGRAM(s) Oral three times a day  lidocaine   4% Patch 1 Patch Transdermal daily  naloxone Injectable 0.4 milliGRAM(s) IV Push once  pantoprazole    Tablet 40 milliGRAM(s) Oral before breakfast  polyethylene glycol 3350 17 Gram(s) Oral daily  senna 2 Tablet(s) Oral at bedtime    MEDICATIONS  (PRN):  acetaminophen     Tablet .. 650 milliGRAM(s) Oral every 6 hours PRN Temp greater or equal to 38C (100.4F), Mild Pain (1 - 3)  aluminum hydroxide/magnesium hydroxide/simethicone Suspension 30 milliLiter(s) Oral every 4 hours PRN Dyspepsia  bisacodyl 5 milliGRAM(s) Oral daily PRN Constipation  melatonin 3 milliGRAM(s) Oral at bedtime PRN Insomnia  ondansetron Injectable 4 milliGRAM(s) IV Push every 8 hours PRN Nausea and/or Vomiting  oxyCODONE    IR 10 milliGRAM(s) Oral every 6 hours PRN Severe Pain (7 - 10)  oxyCODONE    IR 5 milliGRAM(s) Oral every 6 hours PRN Moderate Pain (4 - 6)  simethicone 80 milliGRAM(s) Chew five times a day PRN Gas      Allergies    No Known Allergies    Intolerances        REVIEW OF SYSTEMS:  CONSTITUTIONAL: No fever or chills  HEENT:  No headache, no sore throat  RESPIRATORY: No cough, wheezing, or shortness of breath  CARDIOVASCULAR: No chest pain, palpitations  GASTROINTESTINAL: No abd pain, nausea, vomiting, or diarrhea  GENITOURINARY: No dysuria, frequency, or hematuria  NEUROLOGICAL: no focal weakness or dizziness  MUSCULOSKELETAL: +R shoulder, back pain      Vital Signs Last 24 Hrs  T(C): 36.6 (25 Jun 2023 08:47), Max: 36.9 (24 Jun 2023 13:21)  T(F): 97.9 (25 Jun 2023 08:47), Max: 98.4 (24 Jun 2023 13:21)  HR: 100 (25 Jun 2023 08:47) (73 - 100)  BP: 134/83 (25 Jun 2023 08:47) (105/69 - 134/83)  BP(mean): --  RR: 18 (25 Jun 2023 08:47) (17 - 18)  SpO2: 96% (25 Jun 2023 08:47) (94% - 96%)    Parameters below as of 25 Jun 2023 08:47  Patient On (Oxygen Delivery Method): room air        PHYSICAL EXAM:  GENERAL: initially in severe distress 2/2 pain, improved s/p pain medications this AM  HEENT:  anicteric, moist mucous membranes  CHEST/LUNG:  CTA b/l, no rales, wheezes, or rhonchi  HEART:  RRR, S1, S2  ABDOMEN:  BS+, soft, nt, nd, incisions without inc erythema/drainage  EXTREMITIES: no edema or calf ttp  NERVOUS SYSTEM: answers questions and follows commands appropriately    LABS:                        7.4    6.77  )-----------( 293      ( 25 Jun 2023 06:24 )             23.7     CBC Full  -  ( 25 Jun 2023 06:24 )  WBC Count : 6.77 K/uL  Hemoglobin : 7.4 g/dL  Hematocrit : 23.7 %  Platelet Count - Automated : 293 K/uL  Mean Cell Volume : 88.4 fl  Mean Cell Hemoglobin : 27.6 pg  Mean Cell Hemoglobin Concentration : 31.2 gm/dL  Auto Neutrophil # : x  Auto Lymphocyte # : x  Auto Monocyte # : x  Auto Eosinophil # : x  Auto Basophil # : x  Auto Neutrophil % : x  Auto Lymphocyte % : x  Auto Monocyte % : x  Auto Eosinophil % : x  Auto Basophil % : x    25 Jun 2023 06:24    140    |  107    |  13     ----------------------------<  92     3.8     |  27     |  0.54     Ca    8.1        25 Jun 2023 06:24    TPro  6.1    /  Alb  2.5    /  TBili  0.7    /  DBili  x      /  AST  46     /  ALT  51     /  AlkPhos  50     25 Jun 2023 06:24    PT/INR - ( 23 Jun 2023 22:30 )   PT: 11.6 sec;   INR: 0.99 ratio         PTT - ( 23 Jun 2023 22:30 )  PTT:28.2 sec  Urinalysis Basic - ( 25 Jun 2023 06:24 )    Color: x / Appearance: x / SG: x / pH: x  Gluc: 92 mg/dL / Ketone: x  / Bili: x / Urobili: x   Blood: x / Protein: x / Nitrite: x   Leuk Esterase: x / RBC: x / WBC x   Sq Epi: x / Non Sq Epi: x / Bacteria: x      Culture - Blood (collected 06-23-23 @ 22:30)  Source: .Blood Blood  Preliminary Report (06-25-23 @ 04:02):    No growth to date.    Culture - Blood (collected 06-23-23 @ 22:20)  Source: .Blood Blood  Preliminary Report (06-25-23 @ 04:02):    No growth to date.        RADIOLOGY & ADDITIONAL TESTS:  Personally reviewed.     Consultant(s) Notes Reviewed:  [x] YES  [ ] NO

## 2023-06-25 NOTE — PROGRESS NOTE ADULT - PROBLEM SELECTOR PLAN 1
Likely iron deficiency anemia in the setting of blood loss s/p surgical procedure and now with vaginal bleeding in the setting of known hematosalpinx  - initial hg 6.3 on admissoin  - s/p 2u pRBC with improvement of hg 8.1  - hemodynamics acceptable this morning  - this morning hg 7.4, will give 1u pRBC, recheck CBC at 17:00 (~3 hrs s/p transfusion)  - maintain active type and screen q72hrs, ordered for 6/26 AM  - iron studies ordered Likely iron deficiency anemia in the setting of blood loss s/p surgical procedure and now with vaginal bleeding in the setting of known hematosalpinx. Patient also admits to melena over the past few days  - initial hg 6.3 on admissoin  - s/p 2u pRBC with improvement of hg 8.1  - hemodynamics acceptable this morning  - this morning hg 7.4, will give 1u pRBC, recheck CBC at 17:00 (~3 hrs s/p transfusion)  - maintain active type and screen q72hrs, ordered for 6/26 AM  - iron studies ordered  - gastroenterology dr sutherland consulted: plan for npo after mn with endoscopy tomorrow

## 2023-06-25 NOTE — PROGRESS NOTE ADULT - PROBLEM SELECTOR PLAN 4
s/p liposuction, fat transfer on 6/20/23 in [place]  - In significant pain w/ pleuritic component, s/p CTA  - CTA "No pulmonary embolism.  Post surgical changes in the chest and abdominal wall. No drainable fluid   collection identified."  - pain control w/ tylenol, dilaudid  - on Keflex 500 QID, continue for 6 more days s/p liposuction, fat transfer on 6/20/23 in [place]  - chest wall and back pain management as above  - cont keflex 500mg qid for   - on Keflex 500 QID, last day 6/30/23

## 2023-06-25 NOTE — DISCHARGE NOTE PROVIDER - ATTENDING DISCHARGE PHYSICAL EXAMINATION:
GENERAL: NAD  HEENT:  anicteric, moist mucous membranes  CHEST/LUNG:  normal breath sounds   HEART:  RRR, S1, S2  ABDOMEN:  BS+, soft, nt, nd, incisions without inc erythema/drainage  EXTREMITIES: no edema or calf ttp  NERVOUS SYSTEM: answers questions and follows commands appropriately  PSYCH: flat affect

## 2023-06-25 NOTE — PROGRESS NOTE ADULT - ATTENDING COMMENTS
The patient was seen and evaluated independently by the attending physician.  - I have personally reviewed the pt's labs, imaging, micro data and consultant recommendations.     33 y/o F with pmhx HTN, subclinical hyperthyroidism (likely 2/2 ab negative Graves disease), anemia, thyroid nodules, hematosalpinx, known complex renal cysts presents with chest and back pain, admitted for symptomatic anemia.    - vital signs personally reviewed - normotensive, minimal tachycardia (will monitor overnight)  - lab results personally reviewed - h/h down again  - radiology images reviewed - nothing new    #anemia, multifactorial  #chest pain  #rhabdo  #post-op care s/p cosmetic procedure  #HTN  #hematosalpinx  #pulmonary nodule    - given 1 unit pRBC today due to dropping h/h  - planning for endoscopic evaluation tomorrow - potentially gi bleeding  - pt has had multiple EKG which are wnl, pt is on room air with good exercise tolerance no cardiac history - pt is considered medically optimized to proceed with low risk endoscopic procedure  - for neuropathic pain will add gabapentin 100 milliGRAM(s) Oral three times a day  - for back pain will continue lidocaine 4% Patch 1 Patch Transdermal daily  - for constipation will continue polyethylene glycol 3350 17 Gram(s) Oral daily  - for constipation will continue senna 2 Tablet(s) Oral at bedtime  - for GERD will continue pantoprazole Tablet 40 milliGRAM(s) Oral before breakfast  - for prophylactic abx will continue cephalexin 500 milliGRAM(s) Oral four times a day  - bowel regimen  - narcotic regimen (po)  - supportive care - tylenol, melatonin, zofran, simethicone  - supplements - n/a  - gi ppx - ppi  - vte ppx - scd’s - holding pharmacologic vte ppx due to significant anemia  - diet - NPO p MN  - code status - full  - dispo - TBD pending clinical course -

## 2023-06-25 NOTE — DISCHARGE NOTE PROVIDER - NSDCFUSCHEDAPPT_GEN_ALL_CORE_FT
Riana Craig  [facility]  [address]  Scheduled Appointment: 09/20/2023

## 2023-06-25 NOTE — PROGRESS NOTE ADULT - PROBLEM SELECTOR PLAN 8
hold chemical dvt prophylaxis in the setting of symptomatic anemia  - scds SCDs given symptomatic anemia  - pt encouraged to utilize SCDs. pt informed of inc risk of DVT/PE postoperatively

## 2023-06-25 NOTE — PROGRESS NOTE ADULT - PROBLEM SELECTOR PLAN 6
pelvic MRI 5/13/23 notes left hematosalpinx, suspect contributory to current presentation  - CTAP w/ IV contrast demonstrates "REPRODUCTIVE ORGANS: Uterus and adnexa grossly within normal limits,   better evaluated on recent MRI."  - monitor for worsening vaginal bleeding  - transfusions as above MRI Pelvis 5/15/23: L hematosalpinx, suspect contributory to current presentation   - currently menstruating, monitor for worsening of baseline vaginal bleeding  - anemia and transfusion management as above

## 2023-06-25 NOTE — DISCHARGE NOTE PROVIDER - NSDCCPCAREPLAN_GEN_ALL_CORE_FT
PRINCIPAL DISCHARGE DIAGNOSIS  Diagnosis: Symptomatic anemia  Assessment and Plan of Treatment: Your hemoglobin level was 6.3 on admission . You received 3 units of packed red blood cells (blood) and your hemoglobin improved.   If you experience symptoms including but not limited to a headache, shortness of breath, and/or feeling tired, weak, or dizzy especially upon exertion, please seek immediate medical attention.  Follow up with your primary care doctor within 2 weeks of discharge      SECONDARY DISCHARGE DIAGNOSES  Diagnosis: Chest wall pain  Assessment and Plan of Treatment: Your chest and back pain likely were multifcatorial secondary to your recent surgery and symptomatic anemia.   Continue *** for pain control  Start gabapentin 100mg 1 tab by mouth three times a day  Follow up with your primary care doctor within 2 weeks of discharge    Diagnosis: Elevated creatine kinase  Assessment and Plan of Treatment: Your creatine kinase a marker of tissue inflammation, was elevated. This may have been a result of your surgery earlier in the month of June.   If you notice dark or tea-colored urine, severe muscle pain or weakness, please seek medical attention.    Diagnosis: Status post cosmetic plastic surgery  Assessment and Plan of Treatment: Continue keflex until 6/30/23    Diagnosis: HTN (hypertension)  Assessment and Plan of Treatment: Your blood pressure was monitored in the hospital.  STOP HYDRALAZINE 50MG  START ***  Follow up with your primary care doctor within 2 weeks of discharge for medication management and follow-up.    Diagnosis: Imaging abnormality  Assessment and Plan of Treatment: A CT scan of your chest showed a right lower lobe lung nodule that was about 1.2cm in size.  Follow up with your primary care doctor within 3 months of discharge to obtain a repeat CT chest scan.     PRINCIPAL DISCHARGE DIAGNOSIS  Diagnosis: Symptomatic anemia  Assessment and Plan of Treatment: Your hemoglobin level was 6.3 on admission . You received 3 units of packed red blood cells (blood) and your hemoglobin improved.   If you experience symptoms including but not limited to a headache, shortness of breath, and/or feeling tired, weak, or dizzy especially upon exertion, please seek immediate medical attention.  Follow up with your primary care doctor within 2 weeks of discharge      SECONDARY DISCHARGE DIAGNOSES  Diagnosis: Chest wall pain  Assessment and Plan of Treatment: Your chest and back pain likely were multifcatorial secondary to your recent surgery and symptomatic anemia.   Continue *** for pain control  Start gabapentin 100mg 1 tab by mouth three times a day  Follow up with your primary care doctor within 2 weeks of discharge. Follow up with plastic surgery within 1 week of discharge as well.    Diagnosis: Elevated creatine kinase  Assessment and Plan of Treatment: Your creatine kinase a marker of tissue inflammation, was elevated. This may have been a result of your surgery earlier in the month of June.   If you notice dark or tea-colored urine, severe muscle pain or weakness, please seek medical attention.    Diagnosis: Status post cosmetic plastic surgery  Assessment and Plan of Treatment: It is important you follow proper post op recommendations. Continue Keflex 500 mg 4 times a day till 6/30.   You should also apply the nitroglycerin 2% oitment two times a day to prevent fat necrosis (or dead tissue) of your breasts.   Please follow other post op instructions you recieved from your plastic surgeon, Dr. Torres. Please call his office if you have any questions.   Please follow up [facility] Dr. Rangel within 1 week of discharge for further management.    Diagnosis: HTN (hypertension)  Assessment and Plan of Treatment: Your blood pressure was monitored in the hospital.  STOP HYDRALAZINE 50MG  START ***  Follow up with your primary care doctor within 2 weeks of discharge for medication management and follow-up.    Diagnosis: Imaging abnormality  Assessment and Plan of Treatment: A CT scan of your chest showed a right lower lobe lung nodule that was about 1.2cm in size.  Follow up with your primary care doctor within 3 months of discharge to obtain a repeat CT chest scan.     PRINCIPAL DISCHARGE DIAGNOSIS  Diagnosis: Symptomatic anemia  Assessment and Plan of Treatment: Your hemoglobin level was 6.3 on admission . You received 3 units of packed red blood cells (blood) and your hemoglobin improved.   If you experience symptoms including but not limited to a headache, shortness of breath, and/or feeling tired, weak, or dizzy especially upon exertion, please seek immediate medical attention.  Follow up with your primary care doctor within 2 weeks of discharge      SECONDARY DISCHARGE DIAGNOSES  Diagnosis: Chest wall pain  Assessment and Plan of Treatment: Your chest and back pain likely were multifcatorial secondary to your recent surgery and symptomatic anemia.   cont gabapentin 100mg 1 tab by mouth three times a day  Follow up with your primary care doctor within 2 weeks of discharge. Follow up with plastic surgery within 1 week of discharge as well.    Diagnosis: Elevated creatine kinase  Assessment and Plan of Treatment: Your creatine kinase a marker of tissue inflammation, was elevated. This may have been a result of your surgery earlier in the month of June.   If you notice dark or tea-colored urine, severe muscle pain or weakness, please seek medical attention.    Diagnosis: Status post cosmetic plastic surgery  Assessment and Plan of Treatment: It is important you follow proper post op recommendations. Continue Keflex 500 mg 4 times a day till 6/30.   You should also apply the nitroglycerin 2% oitment two times a day to prevent fat necrosis (or dead tissue) of your breasts.   Please follow other post op instructions you recieved from your plastic surgeon, Dr. Torres. Please call his office if you have any questions.    Diagnosis: HTN (hypertension)  Assessment and Plan of Treatment: Your blood pressure was monitored in the hospital.  STOP HYDRALAZINE 50MG  Follow up with your primary care doctor within 2 weeks of discharge for medication management and follow-up.    Diagnosis: Imaging abnormality  Assessment and Plan of Treatment: A CT scan of your chest showed a right lower lobe lung nodule that was about 1.2cm in size.  Follow up with your primary care doctor within 3 months of discharge to obtain a repeat CT chest scan.     PRINCIPAL DISCHARGE DIAGNOSIS  Diagnosis: Symptomatic anemia  Assessment and Plan of Treatment: Your hemoglobin level was 6.3 on admission . You received 3 units of packed red blood cells (blood) and your hemoglobin improved.   If you experience symptoms including but not limited to a headache, shortness of breath, and/or feeling tired, weak, or dizzy especially upon exertion, please seek immediate medical attention.  Follow up with your primary care doctor within 2 weeks of discharge      SECONDARY DISCHARGE DIAGNOSES  Diagnosis: Chest wall pain  Assessment and Plan of Treatment: Your chest and back pain likely were multifcatorial secondary to your recent surgery and symptomatic anemia.   cont gabapentin 100mg 1 tab by mouth three times a day  Follow up with your primary care doctor within 2 weeks of discharge. Follow up with plastic surgery within 1 week of discharge as well.    Diagnosis: Elevated creatine kinase  Assessment and Plan of Treatment: Your creatine kinase a marker of tissue inflammation, was elevated. This may have been a result of your surgery earlier in the month of June.   If you notice dark or tea-colored urine, severe muscle pain or weakness, please seek medical attention.    Diagnosis: Status post cosmetic plastic surgery  Assessment and Plan of Treatment: It is important you follow proper post op recommendations.   PLEASE CONTINUE Keflex 500 mg 4 times a day till 6/30.   PLEASE CONTINUE TO apply the nitroglycerin 2% oitment two times a day to prevent fat necrosis (or dead tissue) of your breasts.   Please follow other post op instructions you recieved from your plastic surgeon, Dr. Torres. Please call his office if you have any questions.  Dr. Rangel plastic surgery saw you in the hospital and removed your drain and back stitches.   Please follow up outpatient with plastic surgery for further managment post your procedure.    Diagnosis: HTN (hypertension)  Assessment and Plan of Treatment: Your blood pressure was monitored in the hospital.  STOP HYDRALAZINE 50MG  Follow up with your primary care doctor within 2 weeks of discharge for medication management and follow-up.    Diagnosis: Imaging abnormality  Assessment and Plan of Treatment: A CT scan of your chest showed a right lower lobe lung nodule that was about 1.2cm in size.  Follow up with your primary care doctor within 3 months of discharge to obtain a repeat CT chest scan.     PRINCIPAL DISCHARGE DIAGNOSIS  Diagnosis: Symptomatic anemia  Assessment and Plan of Treatment: Your hemoglobin level was 6.3 on admission . You received 3 units of packed red blood cells (blood) and your hemoglobin improved.   You were ordered for iron to help with your anemia. Please CONTINUE ferrous sulfate 325 mg daily.   If you experience symptoms including but not limited to a headache, shortness of breath, and/or feeling tired, weak, or dizzy especially upon exertion, please seek immediate medical attention.  Follow up with your primary care doctor within 2 weeks of discharge      SECONDARY DISCHARGE DIAGNOSES  Diagnosis: Chest wall pain  Assessment and Plan of Treatment: Your chest and back pain likely were multifcatorial secondary to your recent surgery and symptomatic anemia.   cont gabapentin 100mg 1 tab by mouth three times a day  continue oxycodone as needed for pain, as prescribed to you   Follow up with your primary care doctor within 2 weeks of discharge. Follow up with plastic surgery within 1 week of discharge as well.    Diagnosis: Elevated creatine kinase  Assessment and Plan of Treatment: Your creatine kinase a marker of tissue inflammation, was elevated. This may have been a result of your surgery earlier in the month of June.   If you notice dark or tea-colored urine, severe muscle pain or weakness, please seek medical attention.    Diagnosis: Status post cosmetic plastic surgery  Assessment and Plan of Treatment: It is important you follow proper post op recommendations.   PLEASE CONTINUE Keflex 500 mg 4 times a day till 6/30.   PLEASE CONTINUE TO apply the nitroglycerin 2% oitment two times a day to prevent fat necrosis (or dead tissue) of your breasts.   Please follow other post op instructions you recieved from your plastic surgeon, Dr. Torres. Please call his office if you have any questions.  Dr. Rangel plastic surgery saw you in the hospital and removed your drain and back stitches.   Please follow up outpatient with plastic surgery for further managment post your procedure.    Diagnosis: HTN (hypertension)  Assessment and Plan of Treatment: Your blood pressure was monitored in the hospital.  STOP HYDRALAZINE 50MG  Follow up with your primary care doctor within 2 weeks of discharge for medication management and follow-up.    Diagnosis: Imaging abnormality  Assessment and Plan of Treatment: A CT scan of your chest showed a right lower lobe lung nodule that was about 1.2cm in size.  Follow up with your primary care doctor within 3 months of discharge to obtain a repeat CT chest scan.

## 2023-06-25 NOTE — DISCHARGE NOTE PROVIDER - HOSPITAL COURSE
ADMISSION DATE:  06-24-23    ---  FROM ADMISSION H+P:   HPI:  32 year old female with history of HTN, subclinical hyperthyroidism (likely 2/2 ab negative Graves disease), anemia and thyroid nodules, hematosalpinx, known complex renal cysts presents with chest and back pain. Patient is s/p liposuction at [facility] w/ Dr. Stinson with fat transfer to bilateral breasts on june 20th. She has been on a 10 day course of Keflex 500mg po QID since then for post-operative prophylaxis. She has been having some chest and back pain since the procedure but for the past one day she has been having severe right sided rib and back pain worse with deep breaths. She describes the pain as 20 out of 10 and that it feels like it "wiggles back and forth". Has been taking tylenol and oxycodone for pain at home. Patient states that she has a history of anemia in the past. Her Hgb was 12.5 before the surgery, and ~8.5 after the surgery, per pt. Patient notes that she has been having a days worth of abnormal vaginal bleeding, notes finding of hematosalpinx on recent MRI in may, and that she is to be scheduled for b/l salpingectomy. Feels that she may have had a fever. Had chest pain previously but is gone now. Denies abdominal pain, n/v/d, urinary changes, dizziness, headache.    ED course:  vitals - hr 98, bp 117/79, rr 20, t 99.2, spo2 98  labs - hgb 6.3  imaging - CTA / CTAP: No pulmonary embolism. Post surgical changes in the chest and abdominal wall. No drainable fluid collection identified. Right lower lobe nodules measuring up to 1.2 cm. Follow-up chest CT in 3 months is recommended.  EKG - sinus tach 114 no ischemic changes  meds given - NS bolus, zofran, morphine 4mg x1, prbc 2U (24 Jun 2023 02:42)      ---  HOSPITAL COURSE/PERTINENT LABS/PROCEDURES PERFORMED/PENDING TESTS:  The patient presented with chest and pain back, found to be anemic on admission, admitted for symptomatic anemia and pain management s/p liposuction with fat transfer to bilateral breasts on 6/20/23 in [place]. The patient was transfused a total of ***3u pRBC*** throughout the hospital course, with appropriate rise in hemoglobin levels. The patient admitted to melena episodes prior to admission and was therefore evaluated by gastroenterology who performed and endoscopy which showed ***. The patient was noted to have an elevated creatine kinase of 2800 and downtrended to 2400. The patient was on an intravenous then oral pain regimen, with addition of gabapentin inpatient. The patient will continue gabapentin on discharge for shoulder/back pain ***. The patient was continued on keflex for her postoperative prophylactic dose. Patient's home antiHTN hydralazine was held and her BPs were acceptable. On discharge, the patient will **** for blood pressure. The patient was maintained on SCDs given symptomatic anemia, for dvt ppx. Patient educated on postoperative risks of dvt/pe.     The patient was seen and examined on the day of discharge. The patient is medically optimized for discharge to home.    ---  PATIENT CONDITION:  - stable    ---  PHYSICAL EXAM ON DAY OF DISCHARGE:    ---  CONSULTANTS:   Gastroenterology - Dr Yao    ---  ADVANCED CARE PLANNING:  - Code status:    FULL CODE  - MOLST completed:      [ X ] NO     [  ] YES    ---  TIME SPENT:  I, the attending physician, was physically present for the key portions of the evaluation and management (E/M) service provided. The total amount of time spent reviewing the hospital notes, laboratory values, imaging findings, assessing/counseling the patient, discussing with consultant physicians, social work, nursing staff was -- minutes ADMISSION DATE:  06-24-23    ---  FROM ADMISSION H+P:   HPI:  32 year old female with history of HTN, subclinical hyperthyroidism (likely 2/2 ab negative Graves disease), anemia and thyroid nodules, hematosalpinx, known complex renal cysts presents with chest and back pain. Patient is s/p liposuction at [facility] w/ Dr. Stinson with fat transfer to bilateral breasts on june 20th. She has been on a 10 day course of Keflex 500mg po QID since then for post-operative prophylaxis. She has been having some chest and back pain since the procedure but for the past one day she has been having severe right sided rib and back pain worse with deep breaths. She describes the pain as 20 out of 10 and that it feels like it "wiggles back and forth". Has been taking tylenol and oxycodone for pain at home. Patient states that she has a history of anemia in the past. Her Hgb was 12.5 before the surgery, and ~8.5 after the surgery, per pt. Patient notes that she has been having a days worth of abnormal vaginal bleeding, notes finding of hematosalpinx on recent MRI in may, and that she is to be scheduled for b/l salpingectomy. Feels that she may have had a fever. Had chest pain previously but is gone now. Denies abdominal pain, n/v/d, urinary changes, dizziness, headache.    ED course:  vitals - hr 98, bp 117/79, rr 20, t 99.2, spo2 98  labs - hgb 6.3  imaging - CTA / CTAP: No pulmonary embolism. Post surgical changes in the chest and abdominal wall. No drainable fluid collection identified. Right lower lobe nodules measuring up to 1.2 cm. Follow-up chest CT in 3 months is recommended.  EKG - sinus tach 114 no ischemic changes  meds given - NS bolus, zofran, morphine 4mg x1, prbc 2U (24 Jun 2023 02:42)      ---  HOSPITAL COURSE/PERTINENT LABS/PROCEDURES PERFORMED/PENDING TESTS:  The patient presented with chest and pain back, found to be anemic on admission, admitted for symptomatic anemia and pain management s/p liposuction with fat transfer to bilateral breasts on 6/20/23 in [facility]. The patient was transfused a total of 3u pRBC throughout the hospital course, with appropriate rise in hemoglobin levels. The patient admitted to melena episodes prior to admission and was therefore evaluated by gastroenterology. The patient was noted to have an elevated creatine kinase of 2800 and downtrended to 2400. The patient was on an intravenous then oral pain regimen, with addition of gabapentin inpatient. The patient will continue gabapentin on discharge for shoulder/back pain. The patient was continued on keflex for her postoperative prophylactic dose (total 10 day course). Patient's home antiHTN hydralazine was held and her BPs were acceptable. On discharge, the patient will continue home meds for blood pressure. Patient's plastic surgeon from [facility], Dr. Stinson, was consulted. He stated the patient left against medical advise post surgery without proper outpatient follow up. He stated patient needs to continue Keflex, get Lovenox injections, apply Nitroglycerin 2% ointment two times a day to breast to prevent fat necrosis, sit in zero gravity chair instead of a bed, and have her drain removed post op day 7. Plastic surgery Dr. Rangel was consulted for further recommendations. The patient was maintained on SCDs given symptomatic anemia, for dvt ppx. Once H/H was stable patient was ordered for Lovenox, however patient refused. Patient educated on postoperative risks of dvt/pe.   Patient also complained of constipation and placed on a bowel regiment of miralax, senna, milk of magnesia, and PRN fleet enema.     The patient was seen and examined on the day of discharge. The patient is medically optimized for discharge to home.    ---  PATIENT CONDITION:  - stable    ---  PHYSICAL EXAM ON DAY OF DISCHARGE:  T(C): 36.9 (06-28-23 @ 04:44), Max: 37.4 (06-27-23 @ 20:38)  HR: 82 (06-28-23 @ 08:24) (68 - 105)  BP: 139/84 (06-28-23 @ 06:53) (126/83 - 146/100)  RR: 17 (06-28-23 @ 04:44) (17 - 17)  SpO2: 98% (06-28-23 @ 08:24) (94% - 98%)    PHYSICAL EXAM:  GENERAL: NAD, lying in bed comfortably  HEAD:  Atraumatic, Normocephalic  EYES: EOMI, PERRLA, conjunctiva and sclera clear  ENT: Moist mucous membranes  NECK: Supple, No JVD  CHEST/LUNG: Clear to auscultation bilaterally; No rales, rhonchi, wheezing, or rubs. Unlabored respirations  HEART: Regular rate and rhythm; No murmurs, rubs, or gallops  ABDOMEN: Bowel sounds present; Soft, Nontender, Nondistended. No hepatomegally  EXTREMITIES:  2+ Peripheral Pulses, brisk capillary refill. No clubbing, cyanosis, or edema  NERVOUS SYSTEM:  Alert & Oriented X3, speech clear. No deficits   MSK: FROM all 4 extremities, full and equal strength  SKIN: No rashes or lesions    ---  CONSULTANTS:   Gastroenterology - Dr Yao    ---  ADVANCED CARE PLANNING:  - Code status:    FULL CODE  - MOLST completed:      [ X ] NO     [  ] YES    ---  TIME SPENT:  I, the attending physician, was physically present for the key portions of the evaluation and management (E/M) service provided. The total amount of time spent reviewing the hospital notes, laboratory values, imaging findings, assessing/counseling the patient, discussing with consultant physicians, social work, nursing staff was -- minutes ADMISSION DATE:  06-24-23    ---  FROM ADMISSION H+P:   HPI:  32 year old female with history of HTN, subclinical hyperthyroidism (likely 2/2 ab negative Graves disease), anemia and thyroid nodules, hematosalpinx, known complex renal cysts presents with chest and back pain. Patient is s/p liposuction at [facility] w/ Dr. Stinson with fat transfer to bilateral breasts on june 20th. She has been on a 10 day course of Keflex 500mg po QID since then for post-operative prophylaxis. She has been having some chest and back pain since the procedure but for the past one day she has been having severe right sided rib and back pain worse with deep breaths. She describes the pain as 20 out of 10 and that it feels like it "wiggles back and forth". Has been taking tylenol and oxycodone for pain at home. Patient states that she has a history of anemia in the past. Her Hgb was 12.5 before the surgery, and ~8.5 after the surgery, per pt. Patient notes that she has been having a days worth of abnormal vaginal bleeding, notes finding of hematosalpinx on recent MRI in may, and that she is to be scheduled for b/l salpingectomy. Feels that she may have had a fever. Had chest pain previously but is gone now. Denies abdominal pain, n/v/d, urinary changes, dizziness, headache.    ED course:  vitals - hr 98, bp 117/79, rr 20, t 99.2, spo2 98  labs - hgb 6.3  imaging - CTA / CTAP: No pulmonary embolism. Post surgical changes in the chest and abdominal wall. No drainable fluid collection identified. Right lower lobe nodules measuring up to 1.2 cm. Follow-up chest CT in 3 months is recommended.  EKG - sinus tach 114 no ischemic changes  meds given - NS bolus, zofran, morphine 4mg x1, prbc 2U (24 Jun 2023 02:42)      ---  HOSPITAL COURSE/PERTINENT LABS/PROCEDURES PERFORMED/PENDING TESTS:  The patient presented with chest and pain back, found to be anemic on admission, admitted for symptomatic anemia and pain management s/p liposuction with fat transfer to bilateral breasts on 6/20/23 in [place]. The patient was transfused a total of 3u pRBC throughout the hospital course, with appropriate rise in hemoglobin levels. The patient admitted to melena episodes prior to admission and was therefore evaluated by gastroenterology. The patient was noted to have an elevated creatine kinase of 2800 and downtrended to 2400. The patient was on an intravenous then oral pain regimen, with addition of gabapentin inpatient. The patient will continue gabapentin on discharge for shoulder/back pain. The patient was continued on keflex for her postoperative prophylactic dose (total 10 day course). Patient's home antiHTN hydralazine was held and her BPs were acceptable. On discharge, the patient will continue home meds for blood pressure. Patient's plastic surgeon from [place], Dr. Stinson, was consulted. He stated the patient left against medical advise post surgery without proper outpatient follow up. He stated patient needs to continue Keflex, get Lovenox injections, apply Nitroglycerin 2% ointment two times a day to breast to prevent fat necrosis, sit in zero gravity chair instead of a bed, and have her drain removed post op day 7. Plastic surgery Dr. Rangel was consulted for further recommendations. drain was removed. patient has three stitches in the back that was removed by plastics. The patient was maintained on SCDs given symptomatic anemia, for dvt ppx. Once H/H was stable patient was ordered for Lovenox, however patient refused. Patient educated on postoperative risks of dvt/pe.   Patient also complained of constipation and placed on a bowel regiment of miralax, senna, milk of magnesia, and PRN fleet enema.     The patient was seen and examined on the day of discharge. The patient is medically optimized for discharge to home.    ---  PATIENT CONDITION:  - stable    ---  PHYSICAL EXAM ON DAY OF DISCHARGE:  T(C): 36.9 (06-28-23 @ 04:44), Max: 37.4 (06-27-23 @ 20:38)  HR: 82 (06-28-23 @ 08:24) (68 - 105)  BP: 139/84 (06-28-23 @ 06:53) (126/83 - 146/100)  RR: 17 (06-28-23 @ 04:44) (17 - 17)  SpO2: 98% (06-28-23 @ 08:24) (94% - 98%)    PHYSICAL EXAM:  GENERAL: NAD, lying in bed comfortably  HEAD:  Atraumatic, Normocephalic  EYES: EOMI, PERRLA, conjunctiva and sclera clear  ENT: Moist mucous membranes  NECK: Supple, No JVD  CHEST/LUNG: Clear to auscultation bilaterally; No rales, rhonchi, wheezing, or rubs. Unlabored respirations  HEART: Regular rate and rhythm; No murmurs, rubs, or gallops  ABDOMEN: Bowel sounds present; Soft, Nontender, Nondistended. No hepatomegally  EXTREMITIES:  2+ Peripheral Pulses, brisk capillary refill. No clubbing, cyanosis, or edema  NERVOUS SYSTEM:  Alert & Oriented X3, speech clear. No deficits   MSK: FROM all 4 extremities, full and equal strength  SKIN: No rashes or lesions    ---  CONSULTANTS:   Gastroenterology - Dr Yao    ---  ADVANCED CARE PLANNING:  - Code status:    FULL CODE  - MOLST completed:      [ X ] NO     [  ] YES    ---  TIME SPENT:  I, the attending physician, was physically present for the key portions of the evaluation and management (E/M) service provided. The total amount of time spent reviewing the hospital notes, laboratory values, imaging findings, assessing/counseling the patient, discussing with consultant physicians, social work, nursing staff was 50 minutes

## 2023-06-25 NOTE — DISCHARGE NOTE PROVIDER - PROVIDER RX CONTACT NUMBER
[phone] Spoke to patient, gave surgery arrival time of 6am, Kenn BHAKTA, reminded patient no eating or drinking after midnight, comfortable clothing, no makeup, deodorant, lotion, jewelry. Patient verbalized understanding.

## 2023-06-25 NOTE — DISCHARGE NOTE PROVIDER - CARE PROVIDER_API CALL
Ginny Rangel  Plastic Surgery  [address] [place] [address], [address]  [place] [address]  Phone: [phone]  Fax: [phone]  Follow Up Time: 1 week

## 2023-06-25 NOTE — DISCHARGE NOTE PROVIDER - NSDCMRMEDTOKEN_GEN_ALL_CORE_FT
cephalexin 500 mg oral capsule: 1 cap(s) orally 4 times a day  hydrALAZINE 50 mg oral tablet: 1 tab(s) orally 3 times a day   aluminum hydroxide-magnesium hydroxide 200 mg-200 mg/5 mL oral suspension: 30 milliliter(s) orally every 4 hours As needed Dyspepsia  bacitracin 500 units/g topical ointment: 1 Apply topically to affected area 2 times a day  cephalexin 500 mg oral capsule: 1 cap(s) orally 4 times a day  ferrous sulfate 325 mg (65 mg elemental iron) oral tablet: 1 tab(s) orally once a day  gabapentin 100 mg oral capsule: 1 cap(s) orally 3 times a day  nitroglycerin 2% transdermal ointment: transdermal  oxyCODONE 10 mg oral tablet: 1 tab(s) orally every 6 hours As needed Severe Pain (7 - 10)  oxyCODONE 5 mg oral tablet: 1 tab(s) orally every 6 hours As needed Moderate Pain (4 - 6)  pantoprazole 40 mg oral delayed release tablet: 1 tab(s) orally once a day (before a meal)  senna leaf extract oral tablet: 2 tab(s) orally once a day (at bedtime)  simethicone 80 mg oral tablet, chewable: 1 tab(s) orally 5 times a day As needed Gas   aluminum hydroxide-magnesium hydroxide 200 mg-200 mg/5 mL oral suspension: 30 milliliter(s) orally every 4 hours As needed Dyspepsia  bacitracin 500 units/g topical ointment: 1 Apply topically to affected area 2 times a day  cephalexin 500 mg oral capsule: 1 cap(s) orally 4 times a day  ferrous sulfate 325 mg (65 mg elemental iron) oral tablet: 1 tab(s) orally once a day  gabapentin 100 mg oral capsule: 1 cap(s) orally 3 times a day  naloxone 4 mg/0.1 mL nasal spray: 4 milligram(s) intranasally every 5 minutes as needed for opiate overdose  nitroglycerin 2% transdermal ointment: 0.25 inch(es) transdermal 2 times a day mix 1/4 inch with sufficient aquaphor to cover incision area and apply to affected area for prevention of fat necrosis. may cause headache or lower blood pressure  oxyCODONE 10 mg oral tablet: 1 tab(s) orally every 6 hours as needed for Severe Pain (7 - 10) MDD: 40mg  pantoprazole 40 mg oral delayed release tablet: 1 tab(s) orally once a day (before a meal)  senna leaf extract oral tablet: 2 tab(s) orally once a day (at bedtime)  simethicone 80 mg oral tablet, chewable: 1 tab(s) orally 5 times a day As needed Gas

## 2023-06-25 NOTE — PROGRESS NOTE ADULT - PROBLEM SELECTOR PLAN 5
Chronic, stable on admission  - Continue home medication hydralazine with hold params  - Monitor routine hemodynamics Chronic, BP acceptable  - hold home hydralazine 50mg po tid given patient BP acceptable and with symptomatic anemia requiring pRBC transfusion

## 2023-06-25 NOTE — DISCHARGE NOTE PROVIDER - NSDCACTIVITY_GEN_ALL_CORE
Walking - Indoors allowed Do not drive or operate machinery/Walking - Indoors allowed/No heavy lifting/straining

## 2023-06-26 LAB
ALBUMIN SERPL ELPH-MCNC: 2.7 G/DL — LOW (ref 3.3–5)
ALP SERPL-CCNC: 62 U/L — SIGNIFICANT CHANGE UP (ref 40–120)
ALT FLD-CCNC: 45 U/L — SIGNIFICANT CHANGE UP (ref 12–78)
ANION GAP SERPL CALC-SCNC: 3 MMOL/L — LOW (ref 5–17)
AST SERPL-CCNC: 37 U/L — SIGNIFICANT CHANGE UP (ref 15–37)
BILIRUB SERPL-MCNC: 1 MG/DL — SIGNIFICANT CHANGE UP (ref 0.2–1.2)
BUN SERPL-MCNC: 9 MG/DL — SIGNIFICANT CHANGE UP (ref 7–23)
CALCIUM SERPL-MCNC: 8.5 MG/DL — SIGNIFICANT CHANGE UP (ref 8.5–10.1)
CHLORIDE SERPL-SCNC: 106 MMOL/L — SIGNIFICANT CHANGE UP (ref 96–108)
CO2 SERPL-SCNC: 30 MMOL/L — SIGNIFICANT CHANGE UP (ref 22–31)
CREAT SERPL-MCNC: 0.58 MG/DL — SIGNIFICANT CHANGE UP (ref 0.5–1.3)
EGFR: 122 ML/MIN/1.73M2 — SIGNIFICANT CHANGE UP
FERRITIN SERPL-MCNC: 229 NG/ML — HIGH (ref 15–150)
FOLATE SERPL-MCNC: 16.1 NG/ML — SIGNIFICANT CHANGE UP
GLUCOSE SERPL-MCNC: 98 MG/DL — SIGNIFICANT CHANGE UP (ref 70–99)
HCT VFR BLD CALC: 28.8 % — LOW (ref 34.5–45)
HGB BLD-MCNC: 9.1 G/DL — LOW (ref 11.5–15.5)
IRON SATN MFR SERPL: 21 % — SIGNIFICANT CHANGE UP (ref 14–50)
IRON SATN MFR SERPL: 42 UG/DL — SIGNIFICANT CHANGE UP (ref 30–160)
MCHC RBC-ENTMCNC: 28.4 PG — SIGNIFICANT CHANGE UP (ref 27–34)
MCHC RBC-ENTMCNC: 31.6 GM/DL — LOW (ref 32–36)
MCV RBC AUTO: 90 FL — SIGNIFICANT CHANGE UP (ref 80–100)
NRBC # BLD: 0 /100 WBCS — SIGNIFICANT CHANGE UP (ref 0–0)
PLATELET # BLD AUTO: 325 K/UL — SIGNIFICANT CHANGE UP (ref 150–400)
POTASSIUM SERPL-MCNC: 3.9 MMOL/L — SIGNIFICANT CHANGE UP (ref 3.5–5.3)
POTASSIUM SERPL-SCNC: 3.9 MMOL/L — SIGNIFICANT CHANGE UP (ref 3.5–5.3)
PROT SERPL-MCNC: 6.5 G/DL — SIGNIFICANT CHANGE UP (ref 6–8.3)
RBC # BLD: 3.2 M/UL — LOW (ref 3.8–5.2)
RBC # FLD: 15.9 % — HIGH (ref 10.3–14.5)
SODIUM SERPL-SCNC: 139 MMOL/L — SIGNIFICANT CHANGE UP (ref 135–145)
TIBC SERPL-MCNC: 201 UG/DL — LOW (ref 220–430)
TRANSFERRIN SERPL-MCNC: 172 MG/DL — LOW (ref 200–360)
UIBC SERPL-MCNC: 159 UG/DL — SIGNIFICANT CHANGE UP (ref 110–370)
VIT B12 SERPL-MCNC: 1693 PG/ML — HIGH (ref 232–1245)
WBC # BLD: 7.05 K/UL — SIGNIFICANT CHANGE UP (ref 3.8–10.5)
WBC # FLD AUTO: 7.05 K/UL — SIGNIFICANT CHANGE UP (ref 3.8–10.5)

## 2023-06-26 PROCEDURE — 74176 CT ABD & PELVIS W/O CONTRAST: CPT | Mod: 26

## 2023-06-26 PROCEDURE — 71250 CT THORAX DX C-: CPT | Mod: 26

## 2023-06-26 PROCEDURE — 99233 SBSQ HOSP IP/OBS HIGH 50: CPT | Mod: GC

## 2023-06-26 PROCEDURE — 71045 X-RAY EXAM CHEST 1 VIEW: CPT | Mod: 26

## 2023-06-26 RX ORDER — ALPRAZOLAM 0.25 MG
0.25 TABLET ORAL EVERY 8 HOURS
Refills: 0 | Status: DISCONTINUED | OUTPATIENT
Start: 2023-06-26 | End: 2023-06-28

## 2023-06-26 RX ORDER — FERROUS SULFATE 325(65) MG
325 TABLET ORAL DAILY
Refills: 0 | Status: DISCONTINUED | OUTPATIENT
Start: 2023-06-26 | End: 2023-06-28

## 2023-06-26 RX ORDER — HYDROMORPHONE HYDROCHLORIDE 2 MG/ML
0.2 INJECTION INTRAMUSCULAR; INTRAVENOUS; SUBCUTANEOUS ONCE
Refills: 0 | Status: DISCONTINUED | OUTPATIENT
Start: 2023-06-26 | End: 2023-06-26

## 2023-06-26 RX ORDER — IPRATROPIUM/ALBUTEROL SULFATE 18-103MCG
3 AEROSOL WITH ADAPTER (GRAM) INHALATION EVERY 8 HOURS
Refills: 0 | Status: DISCONTINUED | OUTPATIENT
Start: 2023-06-26 | End: 2023-06-28

## 2023-06-26 RX ORDER — GABAPENTIN 400 MG/1
100 CAPSULE ORAL ONCE
Refills: 0 | Status: COMPLETED | OUTPATIENT
Start: 2023-06-26 | End: 2023-06-26

## 2023-06-26 RX ORDER — ALBUTEROL 90 UG/1
2 AEROSOL, METERED ORAL EVERY 4 HOURS
Refills: 0 | Status: DISCONTINUED | OUTPATIENT
Start: 2023-06-26 | End: 2023-06-28

## 2023-06-26 RX ADMIN — Medication 325 MILLIGRAM(S): at 12:59

## 2023-06-26 RX ADMIN — SENNA PLUS 2 TABLET(S): 8.6 TABLET ORAL at 21:03

## 2023-06-26 RX ADMIN — OXYCODONE HYDROCHLORIDE 10 MILLIGRAM(S): 5 TABLET ORAL at 12:51

## 2023-06-26 RX ADMIN — OXYCODONE HYDROCHLORIDE 10 MILLIGRAM(S): 5 TABLET ORAL at 13:58

## 2023-06-26 RX ADMIN — Medication 1 APPLICATION(S): at 06:18

## 2023-06-26 RX ADMIN — Medication 3 MILLIGRAM(S): at 21:03

## 2023-06-26 RX ADMIN — LIDOCAINE 1 PATCH: 4 CREAM TOPICAL at 19:26

## 2023-06-26 RX ADMIN — OXYCODONE HYDROCHLORIDE 10 MILLIGRAM(S): 5 TABLET ORAL at 19:35

## 2023-06-26 RX ADMIN — GABAPENTIN 100 MILLIGRAM(S): 400 CAPSULE ORAL at 02:58

## 2023-06-26 RX ADMIN — Medication 1 APPLICATION(S): at 18:28

## 2023-06-26 RX ADMIN — LIDOCAINE 1 PATCH: 4 CREAM TOPICAL at 14:23

## 2023-06-26 RX ADMIN — Medication 650 MILLIGRAM(S): at 18:45

## 2023-06-26 RX ADMIN — GABAPENTIN 100 MILLIGRAM(S): 400 CAPSULE ORAL at 06:18

## 2023-06-26 RX ADMIN — Medication 650 MILLIGRAM(S): at 02:59

## 2023-06-26 RX ADMIN — OXYCODONE HYDROCHLORIDE 10 MILLIGRAM(S): 5 TABLET ORAL at 00:07

## 2023-06-26 RX ADMIN — GABAPENTIN 100 MILLIGRAM(S): 400 CAPSULE ORAL at 13:19

## 2023-06-26 RX ADMIN — GABAPENTIN 100 MILLIGRAM(S): 400 CAPSULE ORAL at 21:04

## 2023-06-26 RX ADMIN — POLYETHYLENE GLYCOL 3350 17 GRAM(S): 17 POWDER, FOR SOLUTION ORAL at 13:00

## 2023-06-26 RX ADMIN — OXYCODONE HYDROCHLORIDE 10 MILLIGRAM(S): 5 TABLET ORAL at 07:17

## 2023-06-26 RX ADMIN — Medication 650 MILLIGRAM(S): at 01:59

## 2023-06-26 RX ADMIN — Medication 3 MILLILITER(S): at 23:27

## 2023-06-26 RX ADMIN — OXYCODONE HYDROCHLORIDE 10 MILLIGRAM(S): 5 TABLET ORAL at 06:17

## 2023-06-26 RX ADMIN — PANTOPRAZOLE SODIUM 40 MILLIGRAM(S): 20 TABLET, DELAYED RELEASE ORAL at 06:18

## 2023-06-26 RX ADMIN — OXYCODONE HYDROCHLORIDE 10 MILLIGRAM(S): 5 TABLET ORAL at 18:54

## 2023-06-26 RX ADMIN — DEXTROSE MONOHYDRATE, SODIUM CHLORIDE, AND POTASSIUM CHLORIDE 75 MILLILITER(S): 50; .745; 4.5 INJECTION, SOLUTION INTRAVENOUS at 23:59

## 2023-06-26 RX ADMIN — Medication 500 MILLIGRAM(S): at 12:59

## 2023-06-26 RX ADMIN — Medication 500 MILLIGRAM(S): at 06:18

## 2023-06-26 RX ADMIN — Medication 500 MILLIGRAM(S): at 18:28

## 2023-06-26 RX ADMIN — Medication 650 MILLIGRAM(S): at 18:28

## 2023-06-26 NOTE — CHART NOTE - NSCHARTNOTEFT_GEN_A_CORE
Called by RN as patient feels SOB. Patient seen and examined at bedside. Patient lying in bed, does not appear to be in acute distress, VSS satting 98% on RA. Patient states she feels SOB as she cannot take deep breaths due to the pain. Patient rates pain 10/10 currently located on R. upper back. Pain is described as a "muscle spasm". Patient has oxycodone PRN around the clock. Patient does not want further IV pain medications as she does not believe that is the cause of her SOB. However, patient is requesting gabapentin; will give gabapentin 100mg PO x1. As per chart review, patient has had CT chest angio on 6/23 and was ruled out for any acute pulmonary pathology such as PE. Will although obtain CXR and provide incentive spirometry. Rn to call with further changes.
I was in the hallway outside the patient's room when she started moaning loudly to the point that she could be heard from down the thomas. I entered the room to assess the patient.   She reports severe R flank pain, located in R flank near the R ribs, radiating downward into the flank, feels sharp and is associated with breathlessness.     Vitals personally assessed. She could not tolerate the BP cuff but SpO2 is 100% on room air, HR is  during my assessment.   Generally, the pt appears to be in severe pain. She's answering all of my questions and responding appropriately  I removed all of her abd binders and pads to assess the skin with the patient's consent and the primary RN along with student as chaperone.   Skin is warm and dry. Abd wall incisions are clean. No obvious ecchymosis, No fullness or warmth noted.     A/P:  #Unclear etiology of severe R flank pain  - discussed assessment tools available - pt requests CT C/A/P - counseled on the radiation exposure and she instead request MRI C/A/P. I explained the impractical nature of lying on table for the extended timeline associated with an extensive testing modality  - pt consents to repeat stat CT C/A/P to assess for hematoma development, assess for pna, assess potentiall for RP bleeding, etc  - given oxycodone while I was in the room. will reasssess
Patient was seen in follow up after being admitted this morning by my colleague. I personally reviewed the H+P. I reviewed the flowsheets, labs, vitals, radiographic data.   The patient was seen and evaluated at the bedside. There have been no acute events since the time of admission this morning.     Pt c/o CP. EKG unchanged. Enzymes without acute findings. CPK could be elevated in post-operative timeline s/p cosmetic procedure, will trend it. Renal indices stable. Symptoms improving. Pt has had 1 BM in 6 days and needs more aggressive bowel regimen and simethicone. No other acute findings. Serial h/h.

## 2023-06-26 NOTE — CARE COORDINATION ASSESSMENT. - NSPASTMEDSURGHISTORY_GEN_ALL_CORE_FT
PAST MEDICAL & SURGICAL HISTORY:  History of dilation and curettage  2014      Hypertension in pregnancy, preeclampsia      H/O abdominoplasty  2020      Asthma  mild no intubations      Hypertension      Anemia      Hematosalpinx      Subclinical hyperthyroidism

## 2023-06-26 NOTE — CAREGIVER ENGAGEMENT NOTE - CAREGIVER OUTREACH NOTES - FREE TEXT
Pt states her sister is her caregiver, declines to have CM contact her sister at this time.  She is aware CM will remain available to them through hospitalization.

## 2023-06-26 NOTE — PROGRESS NOTE ADULT - SUBJECTIVE AND OBJECTIVE BOX
[facility]  Rigoberto Montanez PA-C  [address] [place] [address]  [phone]      INTERVAL HPI/OVERNIGHT EVENTS:  Pt s/e  Overnight events noted; pt short of breath and coughing/wheezing  Egd cancelled, d/w pt    MEDICATIONS  (STANDING):  bacitracin   Ointment 1 Application(s) Topical two times a day  cephalexin 500 milliGRAM(s) Oral four times a day  ferrous    sulfate 325 milliGRAM(s) Oral daily  gabapentin 100 milliGRAM(s) Oral three times a day  lidocaine   4% Patch 1 Patch Transdermal daily  naloxone Injectable 0.4 milliGRAM(s) IV Push once  pantoprazole    Tablet 40 milliGRAM(s) Oral before breakfast  polyethylene glycol 3350 17 Gram(s) Oral daily  senna 2 Tablet(s) Oral at bedtime    MEDICATIONS  (PRN):  acetaminophen     Tablet .. 650 milliGRAM(s) Oral every 6 hours PRN Temp greater or equal to 38C (100.4F), Mild Pain (1 - 3)  aluminum hydroxide/magnesium hydroxide/simethicone Suspension 30 milliLiter(s) Oral every 4 hours PRN Dyspepsia  bisacodyl 5 milliGRAM(s) Oral daily PRN Constipation  melatonin 3 milliGRAM(s) Oral at bedtime PRN Insomnia  ondansetron Injectable 4 milliGRAM(s) IV Push every 8 hours PRN Nausea and/or Vomiting  oxyCODONE    IR 10 milliGRAM(s) Oral every 6 hours PRN Severe Pain (7 - 10)  oxyCODONE    IR 5 milliGRAM(s) Oral every 6 hours PRN Moderate Pain (4 - 6)  simethicone 80 milliGRAM(s) Chew five times a day PRN Gas      Allergies    No Known Allergies    PHYSICAL EXAM:   Vital Signs:  Vital Signs Last 24 Hrs  T(C): 36.8 (26 Jun 2023 12:48), Max: 36.9 (26 Jun 2023 08:43)  T(F): 98.2 (26 Jun 2023 12:48), Max: 98.4 (26 Jun 2023 08:43)  HR: 71 (26 Jun 2023 12:48) (71 - 96)  BP: 105/69 (26 Jun 2023 12:48) (105/69 - 135/95)  BP(mean): --  RR: 18 (26 Jun 2023 12:48) (17 - 18)  SpO2: 98% (26 Jun 2023 12:48) (93% - 98%)    Parameters below as of 26 Jun 2023 12:48  Patient On (Oxygen Delivery Method): room air    GENERAL:  Appears stated age  HEENT:  NC/AT  CHEST:  Full & symmetric excursion  HEART:  Regular rhythm  ABDOMEN:  Soft, non-tender, non-distended  EXTEREMITIES:  no cyanosis  SKIN:  No rash  NEURO:  Alert      LABS:                        9.1    7.05  )-----------( 325      ( 26 Jun 2023 07:05 )             28.8     06-26    139  |  106  |  9   ----------------------------<  98  3.9   |  30  |  0.58    Ca    8.5      26 Jun 2023 07:05    TPro  6.5  /  Alb  2.7<L>  /  TBili  1.0  /  DBili  x   /  AST  37  /  ALT  45  /  AlkPhos  62  06-26      Urinalysis Basic - ( 26 Jun 2023 07:05 )    Color: x / Appearance: x / SG: x / pH: x  Gluc: 98 mg/dL / Ketone: x  / Bili: x / Urobili: x   Blood: x / Protein: x / Nitrite: x   Leuk Esterase: x / RBC: x / WBC x   Sq Epi: x / Non Sq Epi: x / Bacteria: x

## 2023-06-26 NOTE — PROGRESS NOTE ADULT - SUBJECTIVE AND OBJECTIVE BOX
Patient is a 32y old  Female who presents with a chief complaint of symptomatic anemia (26 Jun 2023 13:08)      INTERVAL HPI/OVERNIGHT EVENTS: Patient seen and examined at bedside.  Patient complained of severe cp and sob overnight but refused pain medication. Cxray was ordered. She continues to complain of chest pain, right flank pain. States that is difficult to take a deep breath. Also states she desaturates to 89% on ambulation. She also complains of nasal flaring, tripod breathing and shallow breathing. She requested a pulmonology consult.   CT a/p ordered as well. She also complains of abdominal pain.       MEDICATIONS  (STANDING):  bacitracin   Ointment 1 Application(s) Topical two times a day  cephalexin 500 milliGRAM(s) Oral four times a day  ferrous    sulfate 325 milliGRAM(s) Oral daily  gabapentin 100 milliGRAM(s) Oral three times a day  lidocaine   4% Patch 1 Patch Transdermal daily  naloxone Injectable 0.4 milliGRAM(s) IV Push once  pantoprazole    Tablet 40 milliGRAM(s) Oral before breakfast  polyethylene glycol 3350 17 Gram(s) Oral daily  senna 2 Tablet(s) Oral at bedtime    MEDICATIONS  (PRN):  acetaminophen     Tablet .. 650 milliGRAM(s) Oral every 6 hours PRN Temp greater or equal to 38C (100.4F), Mild Pain (1 - 3)  aluminum hydroxide/magnesium hydroxide/simethicone Suspension 30 milliLiter(s) Oral every 4 hours PRN Dyspepsia  bisacodyl 5 milliGRAM(s) Oral daily PRN Constipation  melatonin 3 milliGRAM(s) Oral at bedtime PRN Insomnia  ondansetron Injectable 4 milliGRAM(s) IV Push every 8 hours PRN Nausea and/or Vomiting  oxyCODONE    IR 10 milliGRAM(s) Oral every 6 hours PRN Severe Pain (7 - 10)  oxyCODONE    IR 5 milliGRAM(s) Oral every 6 hours PRN Moderate Pain (4 - 6)  simethicone 80 milliGRAM(s) Chew five times a day PRN Gas      Allergies    No Known Allergies    Intolerances        REVIEW OF SYSTEMS:  CONSTITUTIONAL: No fever or chills  HEENT:  No headache, no sore throat  RESPIRATORY: +sob  CARDIOVASCULAR: +cp   GASTROINTESTINAL: +abd pain; nausea, vomiting, or diarrhea  GENITOURINARY: +right flank pain   NEUROLOGICAL: no focal weakness or dizziness  MUSCULOSKELETAL: no myalgias     Vital Signs Last 24 Hrs  T(C): 36.8 (26 Jun 2023 12:48), Max: 36.9 (26 Jun 2023 08:43)  T(F): 98.2 (26 Jun 2023 12:48), Max: 98.4 (26 Jun 2023 08:43)  HR: 71 (26 Jun 2023 12:48) (71 - 96)  BP: 105/69 (26 Jun 2023 12:48) (105/69 - 135/95)  BP(mean): --  RR: 18 (26 Jun 2023 12:48) (17 - 18)  SpO2: 98% (26 Jun 2023 12:48) (93% - 98%)    Parameters below as of 26 Jun 2023 12:48  Patient On (Oxygen Delivery Method): room air        PHYSICAL EXAM:  GENERAL: appears sob, in pain, uncomfortable   HEENT:  anicteric, moist mucous membranes  CHEST/LUNG:  +RLL mild crackles   HEART:  RRR, S1, S2  ABDOMEN:  BS+, soft, nt, nd, incisions without inc erythema/drainage  EXTREMITIES: no edema or calf ttp  NERVOUS SYSTEM: answers questions and follows commands appropriately  PSYCH: flat affect       LABS:                        9.1    7.05  )-----------( 325      ( 26 Jun 2023 07:05 )             28.8     CBC Full  -  ( 26 Jun 2023 07:05 )  WBC Count : 7.05 K/uL  Hemoglobin : 9.1 g/dL  Hematocrit : 28.8 %  Platelet Count - Automated : 325 K/uL  Mean Cell Volume : 90.0 fl  Mean Cell Hemoglobin : 28.4 pg  Mean Cell Hemoglobin Concentration : 31.6 gm/dL  Auto Neutrophil # : x  Auto Lymphocyte # : x  Auto Monocyte # : x  Auto Eosinophil # : x  Auto Basophil # : x  Auto Neutrophil % : x  Auto Lymphocyte % : x  Auto Monocyte % : x  Auto Eosinophil % : x  Auto Basophil % : x    26 Jun 2023 07:05    139    |  106    |  9      ----------------------------<  98     3.9     |  30     |  0.58     Ca    8.5        26 Jun 2023 07:05    TPro  6.5    /  Alb  2.7    /  TBili  1.0    /  DBili  x      /  AST  37     /  ALT  45     /  AlkPhos  62     26 Jun 2023 07:05      Urinalysis Basic - ( 26 Jun 2023 07:05 )    Color: x / Appearance: x / SG: x / pH: x  Gluc: 98 mg/dL / Ketone: x  / Bili: x / Urobili: x   Blood: x / Protein: x / Nitrite: x   Leuk Esterase: x / RBC: x / WBC x   Sq Epi: x / Non Sq Epi: x / Bacteria: x      CAPILLARY BLOOD GLUCOSE            Culture - Urine (collected 06-24-23 @ 12:50)  Source: Clean Catch Clean Catch (Midstream)  Final Report (06-25-23 @ 22:29):    <10,000 CFU/mL Normal Urogenital Zoe    Culture - Blood (collected 06-23-23 @ 22:30)  Source: .Blood Blood  Preliminary Report (06-25-23 @ 04:02):    No growth to date.    Culture - Blood (collected 06-23-23 @ 22:20)  Source: .Blood Blood  Preliminary Report (06-25-23 @ 04:02):    No growth to date.        RADIOLOGY & ADDITIONAL TESTS:    Personally reviewed.     Consultant(s) Notes Reviewed:  [x] YES  [ ] NO     Patient is a 32y old  Female who presents with a chief complaint of symptomatic anemia (26 Jun 2023 13:08)      INTERVAL HPI/OVERNIGHT EVENTS: Patient seen and examined at bedside.  Patient complained of severe cp and sob overnight but refused pain medication. Cxray was ordered. She continues to complain of chest pain, right flank pain. States that is difficult to take a deep breath. Also states she desaturates to 89% on ambulation. She also complains of nasal flaring, tripod breathing and shallow breathing. She requested a pulmonology consult.   CT a/p ordered as well. She also complains of abdominal pain.   Reache out to Dr. Stinson in [place] to discuss patient awaiting call back.     MEDICATIONS  (STANDING):  bacitracin   Ointment 1 Application(s) Topical two times a day  cephalexin 500 milliGRAM(s) Oral four times a day  ferrous    sulfate 325 milliGRAM(s) Oral daily  gabapentin 100 milliGRAM(s) Oral three times a day  lidocaine   4% Patch 1 Patch Transdermal daily  naloxone Injectable 0.4 milliGRAM(s) IV Push once  pantoprazole    Tablet 40 milliGRAM(s) Oral before breakfast  polyethylene glycol 3350 17 Gram(s) Oral daily  senna 2 Tablet(s) Oral at bedtime    MEDICATIONS  (PRN):  acetaminophen     Tablet .. 650 milliGRAM(s) Oral every 6 hours PRN Temp greater or equal to 38C (100.4F), Mild Pain (1 - 3)  aluminum hydroxide/magnesium hydroxide/simethicone Suspension 30 milliLiter(s) Oral every 4 hours PRN Dyspepsia  bisacodyl 5 milliGRAM(s) Oral daily PRN Constipation  melatonin 3 milliGRAM(s) Oral at bedtime PRN Insomnia  ondansetron Injectable 4 milliGRAM(s) IV Push every 8 hours PRN Nausea and/or Vomiting  oxyCODONE    IR 10 milliGRAM(s) Oral every 6 hours PRN Severe Pain (7 - 10)  oxyCODONE    IR 5 milliGRAM(s) Oral every 6 hours PRN Moderate Pain (4 - 6)  simethicone 80 milliGRAM(s) Chew five times a day PRN Gas      Allergies    No Known Allergies    Intolerances        REVIEW OF SYSTEMS:  CONSTITUTIONAL: No fever or chills  HEENT:  No headache, no sore throat  RESPIRATORY: +sob  CARDIOVASCULAR: +cp   GASTROINTESTINAL: +abd pain; nausea, vomiting, or diarrhea  GENITOURINARY: +right flank pain   NEUROLOGICAL: no focal weakness or dizziness  MUSCULOSKELETAL: no myalgias     Vital Signs Last 24 Hrs  T(C): 36.8 (26 Jun 2023 12:48), Max: 36.9 (26 Jun 2023 08:43)  T(F): 98.2 (26 Jun 2023 12:48), Max: 98.4 (26 Jun 2023 08:43)  HR: 71 (26 Jun 2023 12:48) (71 - 96)  BP: 105/69 (26 Jun 2023 12:48) (105/69 - 135/95)  BP(mean): --  RR: 18 (26 Jun 2023 12:48) (17 - 18)  SpO2: 98% (26 Jun 2023 12:48) (93% - 98%)    Parameters below as of 26 Jun 2023 12:48  Patient On (Oxygen Delivery Method): room air        PHYSICAL EXAM:  GENERAL: appears sob, in pain, uncomfortable   HEENT:  anicteric, moist mucous membranes  CHEST/LUNG:  +RLL mild crackles   HEART:  RRR, S1, S2  ABDOMEN:  BS+, soft, nt, nd, incisions without inc erythema/drainage  EXTREMITIES: no edema or calf ttp  NERVOUS SYSTEM: answers questions and follows commands appropriately  PSYCH: flat affect       LABS:                        9.1    7.05  )-----------( 325      ( 26 Jun 2023 07:05 )             28.8     CBC Full  -  ( 26 Jun 2023 07:05 )  WBC Count : 7.05 K/uL  Hemoglobin : 9.1 g/dL  Hematocrit : 28.8 %  Platelet Count - Automated : 325 K/uL  Mean Cell Volume : 90.0 fl  Mean Cell Hemoglobin : 28.4 pg  Mean Cell Hemoglobin Concentration : 31.6 gm/dL  Auto Neutrophil # : x  Auto Lymphocyte # : x  Auto Monocyte # : x  Auto Eosinophil # : x  Auto Basophil # : x  Auto Neutrophil % : x  Auto Lymphocyte % : x  Auto Monocyte % : x  Auto Eosinophil % : x  Auto Basophil % : x    26 Jun 2023 07:05    139    |  106    |  9      ----------------------------<  98     3.9     |  30     |  0.58     Ca    8.5        26 Jun 2023 07:05    TPro  6.5    /  Alb  2.7    /  TBili  1.0    /  DBili  x      /  AST  37     /  ALT  45     /  AlkPhos  62     26 Jun 2023 07:05      Urinalysis Basic - ( 26 Jun 2023 07:05 )    Color: x / Appearance: x / SG: x / pH: x  Gluc: 98 mg/dL / Ketone: x  / Bili: x / Urobili: x   Blood: x / Protein: x / Nitrite: x   Leuk Esterase: x / RBC: x / WBC x   Sq Epi: x / Non Sq Epi: x / Bacteria: x      CAPILLARY BLOOD GLUCOSE            Culture - Urine (collected 06-24-23 @ 12:50)  Source: Clean Catch Clean Catch (Midstream)  Final Report (06-25-23 @ 22:29):    <10,000 CFU/mL Normal Urogenital Zoe    Culture - Blood (collected 06-23-23 @ 22:30)  Source: .Blood Blood  Preliminary Report (06-25-23 @ 04:02):    No growth to date.    Culture - Blood (collected 06-23-23 @ 22:20)  Source: .Blood Blood  Preliminary Report (06-25-23 @ 04:02):    No growth to date.        RADIOLOGY & ADDITIONAL TESTS:    Personally reviewed.     Consultant(s) Notes Reviewed:  [x] YES  [ ] NO     Patient is a 32y old  Female who presents with a chief complaint of symptomatic anemia (26 Jun 2023 13:08)      INTERVAL HPI/OVERNIGHT EVENTS: Patient seen and examined at bedside.  Patient complained of severe cp and sob overnight but refused pain medication. Cxray was ordered. She continues to complain of chest pain, right flank pain. States that is difficult to take a deep breath. Also states she desaturates to 89% on ambulation. She also complains of nasal flaring, tripod breathing and shallow breathing. She requested a pulmonology consult.   CT a/p ordered as well. She also complains of abdominal pain.   Reached out to Dr. Stinson in [place] to discuss patient. He stated patient did not present to any follow op appts and signed out AMA. During her procedure fat was transferred from abdomen to below the breast dermal skin. Patient was told to apply Nitrobid cream 2% to both breasts, take PO Keflex 500 mg four times a day for 10 days, inject 40 mg subQ Lovenox every other day, Percocet PRN for pain, and Zofran PRN for nausea. Patient also has a drain that is to be removed 7 days after procedure. He also stated that patient can call te office to discuss post op care over the phone.     MEDICATIONS  (STANDING):  bacitracin   Ointment 1 Application(s) Topical two times a day  cephalexin 500 milliGRAM(s) Oral four times a day  ferrous    sulfate 325 milliGRAM(s) Oral daily  gabapentin 100 milliGRAM(s) Oral three times a day  lidocaine   4% Patch 1 Patch Transdermal daily  naloxone Injectable 0.4 milliGRAM(s) IV Push once  pantoprazole    Tablet 40 milliGRAM(s) Oral before breakfast  polyethylene glycol 3350 17 Gram(s) Oral daily  senna 2 Tablet(s) Oral at bedtime    MEDICATIONS  (PRN):  acetaminophen     Tablet .. 650 milliGRAM(s) Oral every 6 hours PRN Temp greater or equal to 38C (100.4F), Mild Pain (1 - 3)  aluminum hydroxide/magnesium hydroxide/simethicone Suspension 30 milliLiter(s) Oral every 4 hours PRN Dyspepsia  bisacodyl 5 milliGRAM(s) Oral daily PRN Constipation  melatonin 3 milliGRAM(s) Oral at bedtime PRN Insomnia  ondansetron Injectable 4 milliGRAM(s) IV Push every 8 hours PRN Nausea and/or Vomiting  oxyCODONE    IR 10 milliGRAM(s) Oral every 6 hours PRN Severe Pain (7 - 10)  oxyCODONE    IR 5 milliGRAM(s) Oral every 6 hours PRN Moderate Pain (4 - 6)  simethicone 80 milliGRAM(s) Chew five times a day PRN Gas      Allergies    No Known Allergies    Intolerances        REVIEW OF SYSTEMS:  CONSTITUTIONAL: No fever or chills  HEENT:  No headache, no sore throat  RESPIRATORY: +sob  CARDIOVASCULAR: +cp   GASTROINTESTINAL: +abd pain; nausea, vomiting, or diarrhea  GENITOURINARY: +right flank pain   NEUROLOGICAL: no focal weakness or dizziness  MUSCULOSKELETAL: no myalgias     Vital Signs Last 24 Hrs  T(C): 36.8 (26 Jun 2023 12:48), Max: 36.9 (26 Jun 2023 08:43)  T(F): 98.2 (26 Jun 2023 12:48), Max: 98.4 (26 Jun 2023 08:43)  HR: 71 (26 Jun 2023 12:48) (71 - 96)  BP: 105/69 (26 Jun 2023 12:48) (105/69 - 135/95)  BP(mean): --  RR: 18 (26 Jun 2023 12:48) (17 - 18)  SpO2: 98% (26 Jun 2023 12:48) (93% - 98%)    Parameters below as of 26 Jun 2023 12:48  Patient On (Oxygen Delivery Method): room air        PHYSICAL EXAM:  GENERAL: appears sob, in pain, uncomfortable   HEENT:  anicteric, moist mucous membranes  CHEST/LUNG:  +RLL mild crackles   HEART:  RRR, S1, S2  ABDOMEN:  BS+, soft, nt, nd, incisions without inc erythema/drainage  EXTREMITIES: no edema or calf ttp  NERVOUS SYSTEM: answers questions and follows commands appropriately  PSYCH: flat affect       LABS:                        9.1    7.05  )-----------( 325      ( 26 Jun 2023 07:05 )             28.8     CBC Full  -  ( 26 Jun 2023 07:05 )  WBC Count : 7.05 K/uL  Hemoglobin : 9.1 g/dL  Hematocrit : 28.8 %  Platelet Count - Automated : 325 K/uL  Mean Cell Volume : 90.0 fl  Mean Cell Hemoglobin : 28.4 pg  Mean Cell Hemoglobin Concentration : 31.6 gm/dL  Auto Neutrophil # : x  Auto Lymphocyte # : x  Auto Monocyte # : x  Auto Eosinophil # : x  Auto Basophil # : x  Auto Neutrophil % : x  Auto Lymphocyte % : x  Auto Monocyte % : x  Auto Eosinophil % : x  Auto Basophil % : x    26 Jun 2023 07:05    139    |  106    |  9      ----------------------------<  98     3.9     |  30     |  0.58     Ca    8.5        26 Jun 2023 07:05    TPro  6.5    /  Alb  2.7    /  TBili  1.0    /  DBili  x      /  AST  37     /  ALT  45     /  AlkPhos  62     26 Jun 2023 07:05      Urinalysis Basic - ( 26 Jun 2023 07:05 )    Color: x / Appearance: x / SG: x / pH: x  Gluc: 98 mg/dL / Ketone: x  / Bili: x / Urobili: x   Blood: x / Protein: x / Nitrite: x   Leuk Esterase: x / RBC: x / WBC x   Sq Epi: x / Non Sq Epi: x / Bacteria: x      CAPILLARY BLOOD GLUCOSE            Culture - Urine (collected 06-24-23 @ 12:50)  Source: Clean Catch Clean Catch (Midstream)  Final Report (06-25-23 @ 22:29):    <10,000 CFU/mL Normal Urogenital Zoe    Culture - Blood (collected 06-23-23 @ 22:30)  Source: .Blood Blood  Preliminary Report (06-25-23 @ 04:02):    No growth to date.    Culture - Blood (collected 06-23-23 @ 22:20)  Source: .Blood Blood  Preliminary Report (06-25-23 @ 04:02):    No growth to date.        RADIOLOGY & ADDITIONAL TESTS:    Personally reviewed.     Consultant(s) Notes Reviewed:  [x] YES  [ ] NO

## 2023-06-26 NOTE — PROGRESS NOTE ADULT - PROBLEM SELECTOR PLAN 4
s/p liposuction, fat transfer on 6/20/23 in [place] with Dr. Stinson office number [phone]  - Contacted Dr. Stinson's office to discuss patient and side effects of procedure, waiting for call back  - chest wall and back pain management as above  - cont keflex 500mg qid for   - on Keflex 500 QID, last day 6/30/23 s/p liposuction, fat transfer on 6/20/23 in [place] with Dr. Stinson office number [phone]  - Contacted Dr. Stinson's office to discuss patient and side effects of procedure   - He stated patient did not present to any follow op appts and signed out AMA. During her procedure fat was transferred from abdomen to below the breast dermal skin. Patient was told to apply Nitrobid cream 2% to both breasts, take PO Keflex 500 mg four times a day for 10 days, inject 40 mg subQ Lovenox every other day, Percocet PRN for pain, and Zofran PRN for nausea. Patient also has a drain that is to be removed 7 days after procedure. He also stated that patient can call te office to discuss post op care over the phone.   - Informed patient to call Dr. Stinson's office and she stated she will call tomorrow morning  - chest wall and back pain management as above  - cont keflex 500mg qid for   - on Keflex 500 QID, last day 6/30/23  - Will reach out to plastic surgery tomorrow for further recs

## 2023-06-26 NOTE — CARE COORDINATION ASSESSMENT. - NSCAREPROVIDERS_GEN_ALL_CORE_FT
CARE PROVIDERS:  Accepting Physician: Librado Lubin  Administration: Jesús Cole  Admitting: Librado Lubin  Attending: Jesús Cole  Case Management: Charlie Dominique  Consultant: Pennie Conley  Consultant: Jose M Yao  Consultant: Nelda Montanez ED Attending: Edis Albarado ED Nurse: Jim Nichols  Nurse: Silvia Hull  Nurse: Bibi Arias  Nurse: Addie Dewitt  Ordered: ADM, User  Ordered: Doctor, Unknown  Override: Vera Castaneda  Override: Yessy Chu  Override: Lillian Khan  PCA/Nursing Assistant: Yessy Chu  Primary Team: Saul Farrell  Primary Team: Tad Lee  Primary Team: Marge Grant  Primary Team: Helen Ace  Primary Team: Shalonda Salvador  Registered Dietitian: No Mosley

## 2023-06-26 NOTE — CARE COORDINATION ASSESSMENT. - NS SW READMITTED REASON
6/20 had initial plastics procedure, now admitted with symptomatic anemia/current reason for admission unrelated to previous admission

## 2023-06-26 NOTE — PROGRESS NOTE ADULT - PROBLEM SELECTOR PLAN 2
Pt p/w chest and back pain, ?pleuritic component  - troponin negative 6/24  - ekg 6/24 without acute ischemic changes  - CTA negative for pulmonary embolism on admission  - Repeat chest xray ordered overnight as patient complained of cp and sob  - Cxray showed clear lungs   - CT a/p ordered as well showed No acute pulmonary process. No acute visceral organ injury, given limitations of noncontrast exam.Postsurgical changes in and generalized body wall edema.  - patient educated on importance of scd compliance given she is post op  - pain regimen: tylenol, oxycodone 5mg po q6hrs mod pain, oxycodone 10mg po q6hrs severe pain  - add gabapentin 300mg x 1 now, gabapentin 100mg tid on 6/25/23 PM, standing order Pt p/w chest and back pain, ?pleuritic component  - troponin negative 6/24  - ekg 6/24 without acute ischemic changes  - CTA negative for pulmonary embolism on admission  - Repeat chest xray ordered overnight as patient complained of cp and sob  - Cxray showed clear lungs   - CT a/p ordered as well showed No acute pulmonary process. No acute visceral organ injury, given limitations of noncontrast exam.Postsurgical changes in and generalized body wall edema.  - patient educated on importance of scd compliance given she is post op  - pain regimen: tylenol, oxycodone 5mg po q6hrs mod pain, oxycodone 10mg po q6hrs severe pain  - add gabapentin 300mg x 1 now, gabapentin 100mg tid on 6/25/23 PM, standing order  - Will consult plastic surgery tomorrow for further recs Pt p/w chest and back pain, ?pleuritic component  - troponin negative 6/24  - ekg 6/24 without acute ischemic changes  - CTA negative for pulmonary embolism on admission  - Repeat chest xray ordered overnight as patient complained of cp and sob  - Cxray showed clear lungs   - CT a/p ordered as well showed No acute pulmonary process. No acute visceral organ injury, given limitations of noncontrast exam.Postsurgical changes in and generalized body wall edema.  - patient educated on importance of scd compliance given she is post op  - pain regimen: tylenol, oxycodone 5mg po q6hrs mod pain, oxycodone 10mg po q6hrs severe pain  - add gabapentin 300mg x 1 now, gabapentin 100mg tid on 6/25/23 PM, standing order  - Nebs order per pulmonology   - Pulm Dr. Vivar consulted   - Pmnr Dr. Curtis consulted f/u recs   - Will consult plastic surgery tomorrow for further recs

## 2023-06-26 NOTE — CARE COORDINATION ASSESSMENT. - OTHER PERTINENT DISCHARGE PLANNING INFORMATION:
Pt presented to ED with c/o chest  and back pain s/p liposuction with fat transfer b/l breast.  + MIKEY still in place.  Pt anemic upon admission, given a total of 3 units pRBC and for EGD today.  Confirmed PMD is [facility].  No skilled needs anticipated upon transition to home.

## 2023-06-26 NOTE — PROVIDER CONTACT NOTE (CHANGE IN STATUS NOTIFICATION) - SITUATION
Patient complaining of difficulty breathing that is getting worse
Patient complaining of difficulty breathing

## 2023-06-26 NOTE — PROGRESS NOTE ADULT - PROBLEM SELECTOR PLAN 8
SCDs given symptomatic anemia  - pt encouraged to utilize SCDs. pt informed of inc risk of DVT/PE postoperatively

## 2023-06-26 NOTE — CAREGIVER ENGAGEMENT NOTE - CAREGIVER ADDRESS
76 YOF with Herpes Zoster now with AMS/encephalopathy, r/o stroke, r/o meningitis/encephalitis [place]

## 2023-06-26 NOTE — PROGRESS NOTE ADULT - PROBLEM SELECTOR PLAN 6
MRI Pelvis 5/15/23: L hematosalpinx, suspect contributory to current presentation   - currently menstruating, monitor for worsening of baseline vaginal bleeding  - anemia and transfusion management as above

## 2023-06-26 NOTE — PROGRESS NOTE ADULT - PROBLEM SELECTOR PLAN 1
Likely iron deficiency anemia in the setting of blood loss s/p surgical procedure and now with vaginal bleeding in the setting of known hematosalpinx. Patient also admits to melena over the past few days  -s/p 3 units pRBC Hb 9.1 today   - hemodynamics acceptable this morning  - iron studies ordered, PO ferrous sulfate ordered   - endoscopy held given patients respiratory complaints and cp today   - FOBT ordered, f/u results

## 2023-06-26 NOTE — CARE COORDINATION ASSESSMENT. - NSDCPLANSERVICES_GEN_ALL_CORE
No anticipated skilled needs anticipated for transition to home, Pt states a family member will be available to provide transportation home./No Anticipated Discharge Needs

## 2023-06-26 NOTE — PROGRESS NOTE ADULT - PROBLEM SELECTOR PLAN 5
Chronic, BP acceptable  - hold home hydralazine 50mg po tid given patient BP acceptable and with symptomatic anemia requiring pRBC transfusion

## 2023-06-26 NOTE — PROVIDER CONTACT NOTE (CHANGE IN STATUS NOTIFICATION) - BACKGROUND
Pt is s/p multiple cosmetic procedures 6/20 and admitted for symptomatic anemia and s/p 3 units PRBC
Pt is s/p multiple cosmetic procedures 6/20 and admitted for symptomatic anemia and s/p 3 units PRBC

## 2023-06-26 NOTE — PROGRESS NOTE ADULT - ATTENDING COMMENTS
The patient was seen and evaluated independently by the attending physician.  - I have personally reviewed the pt's labs, imaging, micro data and consultant recommendations.     31 y/o F with pmhx HTN, subclinical hyperthyroidism (likely 2/2 ab negative Graves disease), anemia, thyroid nodules, hematosalpinx, known complex renal cysts presents with chest and back pain, admitted for symptomatic anemia.    - vital signs personally reviewed - normotensive, HR improving now, on room air  - lab results personally reviewed - h/h stabilized  - radiology images reviewed - reviewed CT C/A/P - no acute findings    #anemia, multifactorial  #chest pain, MSK  #rhabdo  #post-op care s/p cosmetic procedure  #HTN  #hematosalpinx  #pulmonary nodule    - pt complaining of severe pain today which appears out of proportion to physical exam findings - repeat imaging unrevealing  - for constipation will continue polyethylene glycol 3350 17 Gram(s) Oral daily  - for constipation will continue senna 2 Tablet(s) Oral at bedtime  - for coughing will continue albuterol/ipratropium for Nebulization 3 milliLiter(s) Nebulizer every 8 hours  - for GERD will continue pantoprazole Tablet 40 milliGRAM(s) Oral before breakfast  - for iron def will continue ferrous sulfate 325 milliGRAM(s) Oral daily  - for neuropathic pain will continue gabapentin 100 milliGRAM(s) Oral three times a day  - for preventive topical therapy will continue bacitracin Ointment 1 Application(s) Topical two times a day  - for prophylactic measure will continue cephalexin 500 milliGRAM(s) Oral four times a day  - for pain will continue acetaminophen Tablet .. 650 milliGRAM(s) Oral every 6 hours PRN Temp greater or equal to 38C (100.4F), Mild Pain (1 - 3)  - for back pain will continue lidocaine 4% Patch 1 Patch Transdermal daily  - for anxiety will continue ALPRAZolam 0.25 milliGRAM(s) Oral every 8 hours PRN anxiety or spasm  - cont bowel regimen  - supportive care - tylenol, melatonin, zofran, pain control, simethicone  - supplements - as above  - gi ppx - ppi  - vte ppx - holding pharmacologic vte ppx in setting of anemia  - diet - reg  - code status - full  - dispo - TBD pending clinical course -

## 2023-06-27 DIAGNOSIS — K59.00 CONSTIPATION, UNSPECIFIED: ICD-10-CM

## 2023-06-27 LAB
ALBUMIN SERPL ELPH-MCNC: 2.7 G/DL — LOW (ref 3.3–5)
ALP SERPL-CCNC: 73 U/L — SIGNIFICANT CHANGE UP (ref 40–120)
ALT FLD-CCNC: 56 U/L — SIGNIFICANT CHANGE UP (ref 12–78)
ANION GAP SERPL CALC-SCNC: 4 MMOL/L — LOW (ref 5–17)
AST SERPL-CCNC: 48 U/L — HIGH (ref 15–37)
BILIRUB SERPL-MCNC: 0.8 MG/DL — SIGNIFICANT CHANGE UP (ref 0.2–1.2)
BUN SERPL-MCNC: 12 MG/DL — SIGNIFICANT CHANGE UP (ref 7–23)
CALCIUM SERPL-MCNC: 8.7 MG/DL — SIGNIFICANT CHANGE UP (ref 8.5–10.1)
CHLORIDE SERPL-SCNC: 104 MMOL/L — SIGNIFICANT CHANGE UP (ref 96–108)
CO2 SERPL-SCNC: 29 MMOL/L — SIGNIFICANT CHANGE UP (ref 22–31)
CREAT SERPL-MCNC: 0.57 MG/DL — SIGNIFICANT CHANGE UP (ref 0.5–1.3)
EGFR: 122 ML/MIN/1.73M2 — SIGNIFICANT CHANGE UP
GLUCOSE SERPL-MCNC: 98 MG/DL — SIGNIFICANT CHANGE UP (ref 70–99)
HCT VFR BLD CALC: 30.8 % — LOW (ref 34.5–45)
HGB BLD-MCNC: 9.6 G/DL — LOW (ref 11.5–15.5)
MAGNESIUM SERPL-MCNC: 2.3 MG/DL — SIGNIFICANT CHANGE UP (ref 1.6–2.6)
MCHC RBC-ENTMCNC: 28.2 PG — SIGNIFICANT CHANGE UP (ref 27–34)
MCHC RBC-ENTMCNC: 31.2 GM/DL — LOW (ref 32–36)
MCV RBC AUTO: 90.3 FL — SIGNIFICANT CHANGE UP (ref 80–100)
NRBC # BLD: 0 /100 WBCS — SIGNIFICANT CHANGE UP (ref 0–0)
PHOSPHATE SERPL-MCNC: 3.8 MG/DL — SIGNIFICANT CHANGE UP (ref 2.5–4.5)
PLATELET # BLD AUTO: 365 K/UL — SIGNIFICANT CHANGE UP (ref 150–400)
POTASSIUM SERPL-MCNC: 4.1 MMOL/L — SIGNIFICANT CHANGE UP (ref 3.5–5.3)
POTASSIUM SERPL-SCNC: 4.1 MMOL/L — SIGNIFICANT CHANGE UP (ref 3.5–5.3)
PROT SERPL-MCNC: 6.8 G/DL — SIGNIFICANT CHANGE UP (ref 6–8.3)
RBC # BLD: 3.41 M/UL — LOW (ref 3.8–5.2)
RBC # FLD: 16.1 % — HIGH (ref 10.3–14.5)
SODIUM SERPL-SCNC: 137 MMOL/L — SIGNIFICANT CHANGE UP (ref 135–145)
WBC # BLD: 7.64 K/UL — SIGNIFICANT CHANGE UP (ref 3.8–10.5)
WBC # FLD AUTO: 7.64 K/UL — SIGNIFICANT CHANGE UP (ref 3.8–10.5)

## 2023-06-27 PROCEDURE — 99233 SBSQ HOSP IP/OBS HIGH 50: CPT | Mod: GC

## 2023-06-27 RX ORDER — ENOXAPARIN SODIUM 100 MG/ML
40 INJECTION SUBCUTANEOUS EVERY 24 HOURS
Refills: 0 | Status: DISCONTINUED | OUTPATIENT
Start: 2023-06-27 | End: 2023-06-28

## 2023-06-27 RX ORDER — NITROGLYCERIN 6.5 MG
0.25 CAPSULE, EXTENDED RELEASE ORAL
Refills: 0 | Status: DISCONTINUED | OUTPATIENT
Start: 2023-06-27 | End: 2023-06-28

## 2023-06-27 RX ORDER — MAGNESIUM HYDROXIDE 400 MG/1
30 TABLET, CHEWABLE ORAL DAILY
Refills: 0 | Status: DISCONTINUED | OUTPATIENT
Start: 2023-06-27 | End: 2023-06-28

## 2023-06-27 RX ORDER — NITROGLYCERIN 6.5 MG
6 CAPSULE, EXTENDED RELEASE ORAL
Refills: 0 | Status: DISCONTINUED | OUTPATIENT
Start: 2023-06-27 | End: 2023-06-27

## 2023-06-27 RX ORDER — LACTULOSE 10 G/15ML
20 SOLUTION ORAL ONCE
Refills: 0 | Status: COMPLETED | OUTPATIENT
Start: 2023-06-27 | End: 2023-06-27

## 2023-06-27 RX ORDER — PETROLATUM,WHITE
1 JELLY (GRAM) TOPICAL
Refills: 0 | Status: DISCONTINUED | OUTPATIENT
Start: 2023-06-27 | End: 2023-06-28

## 2023-06-27 RX ADMIN — Medication 3 MILLIGRAM(S): at 23:41

## 2023-06-27 RX ADMIN — Medication 500 MILLIGRAM(S): at 00:46

## 2023-06-27 RX ADMIN — LIDOCAINE 1 PATCH: 4 CREAM TOPICAL at 19:13

## 2023-06-27 RX ADMIN — Medication 3 MILLILITER(S): at 14:51

## 2023-06-27 RX ADMIN — OXYCODONE HYDROCHLORIDE 10 MILLIGRAM(S): 5 TABLET ORAL at 09:55

## 2023-06-27 RX ADMIN — SENNA PLUS 2 TABLET(S): 8.6 TABLET ORAL at 21:04

## 2023-06-27 RX ADMIN — Medication 500 MILLIGRAM(S): at 18:12

## 2023-06-27 RX ADMIN — Medication 5 MILLIGRAM(S): at 11:35

## 2023-06-27 RX ADMIN — Medication 0.25 MILLIGRAM(S): at 05:08

## 2023-06-27 RX ADMIN — Medication 500 MILLIGRAM(S): at 23:11

## 2023-06-27 RX ADMIN — OXYCODONE HYDROCHLORIDE 5 MILLIGRAM(S): 5 TABLET ORAL at 23:11

## 2023-06-27 RX ADMIN — MAGNESIUM HYDROXIDE 30 MILLILITER(S): 400 TABLET, CHEWABLE ORAL at 11:13

## 2023-06-27 RX ADMIN — LIDOCAINE 1 PATCH: 4 CREAM TOPICAL at 02:52

## 2023-06-27 RX ADMIN — Medication 1 APPLICATION(S): at 18:12

## 2023-06-27 RX ADMIN — Medication 500 MILLIGRAM(S): at 05:06

## 2023-06-27 RX ADMIN — Medication 3 MILLILITER(S): at 23:21

## 2023-06-27 RX ADMIN — GABAPENTIN 100 MILLIGRAM(S): 400 CAPSULE ORAL at 21:04

## 2023-06-27 RX ADMIN — POLYETHYLENE GLYCOL 3350 17 GRAM(S): 17 POWDER, FOR SOLUTION ORAL at 11:13

## 2023-06-27 RX ADMIN — Medication 1 APPLICATION(S): at 05:06

## 2023-06-27 RX ADMIN — OXYCODONE HYDROCHLORIDE 5 MILLIGRAM(S): 5 TABLET ORAL at 01:06

## 2023-06-27 RX ADMIN — OXYCODONE HYDROCHLORIDE 10 MILLIGRAM(S): 5 TABLET ORAL at 03:02

## 2023-06-27 RX ADMIN — Medication 5 MILLIGRAM(S): at 10:13

## 2023-06-27 RX ADMIN — GABAPENTIN 100 MILLIGRAM(S): 400 CAPSULE ORAL at 13:13

## 2023-06-27 RX ADMIN — OXYCODONE HYDROCHLORIDE 10 MILLIGRAM(S): 5 TABLET ORAL at 03:32

## 2023-06-27 RX ADMIN — Medication 325 MILLIGRAM(S): at 11:13

## 2023-06-27 RX ADMIN — Medication 0.25 INCH(S): at 18:12

## 2023-06-27 RX ADMIN — PANTOPRAZOLE SODIUM 40 MILLIGRAM(S): 20 TABLET, DELAYED RELEASE ORAL at 05:06

## 2023-06-27 RX ADMIN — LIDOCAINE 1 PATCH: 4 CREAM TOPICAL at 23:10

## 2023-06-27 RX ADMIN — Medication 500 MILLIGRAM(S): at 11:13

## 2023-06-27 RX ADMIN — OXYCODONE HYDROCHLORIDE 5 MILLIGRAM(S): 5 TABLET ORAL at 00:46

## 2023-06-27 RX ADMIN — Medication 1 ENEMA: at 21:03

## 2023-06-27 RX ADMIN — LACTULOSE 20 GRAM(S): 10 SOLUTION ORAL at 13:12

## 2023-06-27 RX ADMIN — OXYCODONE HYDROCHLORIDE 10 MILLIGRAM(S): 5 TABLET ORAL at 11:37

## 2023-06-27 RX ADMIN — OXYCODONE HYDROCHLORIDE 10 MILLIGRAM(S): 5 TABLET ORAL at 16:29

## 2023-06-27 RX ADMIN — Medication 3 MILLILITER(S): at 07:53

## 2023-06-27 RX ADMIN — LIDOCAINE 1 PATCH: 4 CREAM TOPICAL at 11:14

## 2023-06-27 RX ADMIN — GABAPENTIN 100 MILLIGRAM(S): 400 CAPSULE ORAL at 05:06

## 2023-06-27 NOTE — PROGRESS NOTE ADULT - PROBLEM SELECTOR PLAN 2
Pt p/w chest and back pain, ?pleuritic component  - troponin negative 6/24  - ekg 6/24 without acute ischemic changes  - CTA negative for pulmonary embolism on admission  - Repeat chest xray ordered overnight as patient complained of cp and sob  - Cxray showed clear lungs   - CT a/p ordered as well showed No acute pulmonary process. No acute visceral organ injury, given limitations of noncontrast exam.Postsurgical changes in and generalized body wall edema.  - patient educated on importance of scd compliance given she is post op  - pain regimen: tylenol, oxycodone 5mg po q6hrs mod pain, oxycodone 10mg po q6hrs severe pain  - add gabapentin 300mg x 1 now, gabapentin 100mg tid on 6/25/23 PM, standing order  - Nebs order per pulmonology   - Pulm Dr. Vivar consulted   - Pmnr Dr. Curtis consulted f/u recs   - Plastic surgery Dr. Ayala consulted f/u recs   - Lovenox and Nitrobid cream ordered per Dr. Torres's recs

## 2023-06-27 NOTE — PROGRESS NOTE ADULT - SUBJECTIVE AND OBJECTIVE BOX
Date/Time Patient Seen:  		  Referring MD:   Data Reviewed	       Patient is a 32y old  Female who presents with a chief complaint of symptomatic anemia (2023 17:08)      Subjective/HPI     PAST MEDICAL & SURGICAL HISTORY:  No pertinent past medical history    No pertinent past medical history    Hypertension in pregnancy, preeclampsia    Asthma  mild no intubations    Hypertension    Subclinical hyperthyroidism    Hematosalpinx    Anemia      1     History of dilation and curettage      No significant past surgical history    H/O abdominoplasty            Medication list         MEDICATIONS  (STANDING):  albuterol/ipratropium for Nebulization 3 milliLiter(s) Nebulizer every 8 hours  bacitracin   Ointment 1 Application(s) Topical two times a day  cephalexin 500 milliGRAM(s) Oral four times a day  ferrous    sulfate 325 milliGRAM(s) Oral daily  gabapentin 100 milliGRAM(s) Oral three times a day  lidocaine   4% Patch 1 Patch Transdermal daily  naloxone Injectable 0.4 milliGRAM(s) IV Push once  pantoprazole    Tablet 40 milliGRAM(s) Oral before breakfast  polyethylene glycol 3350 17 Gram(s) Oral daily  senna 2 Tablet(s) Oral at bedtime    MEDICATIONS  (PRN):  acetaminophen     Tablet .. 650 milliGRAM(s) Oral every 6 hours PRN Temp greater or equal to 38C (100.4F), Mild Pain (1 - 3)  albuterol    90 MICROgram(s) HFA Inhaler 2 Puff(s) Inhalation every 4 hours PRN Shortness of Breath and/or Wheezing  ALPRAZolam 0.25 milliGRAM(s) Oral every 8 hours PRN anxiety or spasm  aluminum hydroxide/magnesium hydroxide/simethicone Suspension 30 milliLiter(s) Oral every 4 hours PRN Dyspepsia  bisacodyl 5 milliGRAM(s) Oral daily PRN Constipation  melatonin 3 milliGRAM(s) Oral at bedtime PRN Insomnia  ondansetron Injectable 4 milliGRAM(s) IV Push every 8 hours PRN Nausea and/or Vomiting  oxyCODONE    IR 10 milliGRAM(s) Oral every 6 hours PRN Severe Pain (7 - 10)  oxyCODONE    IR 5 milliGRAM(s) Oral every 6 hours PRN Moderate Pain (4 - 6)  simethicone 80 milliGRAM(s) Chew five times a day PRN Gas         Vitals log        ICU Vital Signs Last 24 Hrs  T(C): 36.8 (2023 05:02), Max: 36.9 (2023 08:43)  T(F): 98.2 (2023 05:02), Max: 98.4 (2023 08:43)  HR: 72 (2023 05:02) (71 - 108)  BP: 128/86 (2023 05:02) (105/69 - 135/95)  BP(mean): --  ABP: --  ABP(mean): --  RR: 17 (2023 05:02) (17 - 18)  SpO2: 98% (2023 05:02) (93% - 99%)    O2 Parameters below as of :  Patient On (Oxygen Delivery Method): room air                 Input and Output:  I&O's Detail    2023 07:01  -  2023 07:00  --------------------------------------------------------  IN:    dextrose 5% + sodium chloride 0.45% w/ Additives: 75 mL  Total IN: 75 mL    OUT:    Bulb (mL): 15 mL    Voided (mL): 500 mL  Total OUT: 515 mL    Total NET: -440 mL          Lab Data                        9.1    7.05  )-----------( 325      ( 2023 07:05 )             28.8         139  |  106  |  9   ----------------------------<  98  3.9   |  30  |  0.58    Ca    8.5      2023 07:05    TPro  6.5  /  Alb  2.7<L>  /  TBili  1.0  /  DBili  x   /  AST  37  /  ALT  45  /  AlkPhos  62              Review of Systems	      Objective     Physical Examination    heart s1s2  lung dc BS  head nc      Pertinent Lab findings & Imaging      Pippa:  NO   Adequate UO     I&O's Detail    2023 07:01  -  2023 07:00  --------------------------------------------------------  IN:    dextrose 5% + sodium chloride 0.45% w/ Additives: 75 mL  Total IN: 75 mL    OUT:    Bulb (mL): 15 mL    Voided (mL): 500 mL  Total OUT: 515 mL    Total NET: -440 mL               Discussed with:     Cultures:	        Radiology

## 2023-06-27 NOTE — PROGRESS NOTE ADULT - SUBJECTIVE AND OBJECTIVE BOX
[facility]  Rigoberto Montanez PA-C  [address] [place] [address]  [phone]      INTERVAL HPI/OVERNIGHT EVENTS:  Pt s/e  Reports R flank pain  No BM since last Friday  Hgb noted  Otherwise no GI complaints    MEDICATIONS  (STANDING):  albuterol/ipratropium for Nebulization 3 milliLiter(s) Nebulizer every 8 hours  AQUAPHOR (petrolatum Ointment) 1 Application(s) Topical two times a day  bacitracin   Ointment 1 Application(s) Topical two times a day  bisacodyl 5 milliGRAM(s) Oral daily  cephalexin 500 milliGRAM(s) Oral four times a day  enoxaparin Injectable 40 milliGRAM(s) SubCutaneous every 24 hours  ferrous    sulfate 325 milliGRAM(s) Oral daily  gabapentin 100 milliGRAM(s) Oral three times a day  lactulose Syrup 20 Gram(s) Oral once  lidocaine   4% Patch 1 Patch Transdermal daily  magnesium hydroxide Suspension 30 milliLiter(s) Oral daily  naloxone Injectable 0.4 milliGRAM(s) IV Push once  nitroglycerin    2% Ointment 0.25 Inch(s) Transdermal two times a day  pantoprazole    Tablet 40 milliGRAM(s) Oral before breakfast  polyethylene glycol 3350 17 Gram(s) Oral daily  senna 2 Tablet(s) Oral at bedtime    MEDICATIONS  (PRN):  acetaminophen     Tablet .. 650 milliGRAM(s) Oral every 6 hours PRN Temp greater or equal to 38C (100.4F), Mild Pain (1 - 3)  albuterol    90 MICROgram(s) HFA Inhaler 2 Puff(s) Inhalation every 4 hours PRN Shortness of Breath and/or Wheezing  ALPRAZolam 0.25 milliGRAM(s) Oral every 8 hours PRN anxiety or spasm  aluminum hydroxide/magnesium hydroxide/simethicone Suspension 30 milliLiter(s) Oral every 4 hours PRN Dyspepsia  melatonin 3 milliGRAM(s) Oral at bedtime PRN Insomnia  ondansetron Injectable 4 milliGRAM(s) IV Push every 8 hours PRN Nausea and/or Vomiting  oxyCODONE    IR 10 milliGRAM(s) Oral every 6 hours PRN Severe Pain (7 - 10)  oxyCODONE    IR 5 milliGRAM(s) Oral every 6 hours PRN Moderate Pain (4 - 6)  simethicone 80 milliGRAM(s) Chew five times a day PRN Gas      Allergies    No Known Allergies    PHYSICAL EXAM:   Vital Signs:  Vital Signs Last 24 Hrs  T(C): 36.3 (27 Jun 2023 12:11), Max: 36.9 (26 Jun 2023 18:00)  T(F): 97.3 (27 Jun 2023 12:11), Max: 98.4 (26 Jun 2023 18:00)  HR: 89 (27 Jun 2023 12:11) (71 - 108)  BP: 142/90 (27 Jun 2023 12:11) (105/69 - 142/90)  BP(mean): --  RR: 17 (27 Jun 2023 12:11) (17 - 18)  SpO2: 96% (27 Jun 2023 12:11) (94% - 99%)    Parameters below as of 27 Jun 2023 12:11  Patient On (Oxygen Delivery Method): room air    GENERAL:  Appears stated age  HEENT:  NC/AT  CHEST:  Full & symmetric excursion  HEART:  Regular rhythm  ABDOMEN:  Soft, non-tender, non-distended  EXTEREMITIES:  no cyanosis  SKIN:  No rash  NEURO:  Alert      LABS:                        9.6    7.64  )-----------( 365      ( 27 Jun 2023 06:53 )             30.8     06-27    137  |  104  |  12  ----------------------------<  98  4.1   |  29  |  0.57    Ca    8.7      27 Jun 2023 06:53  Phos  3.8     06-27  Mg     2.3     06-27    TPro  6.8  /  Alb  2.7<L>  /  TBili  0.8  /  DBili  x   /  AST  48<H>  /  ALT  56  /  AlkPhos  73  06-27      Urinalysis Basic - ( 27 Jun 2023 06:53 )    Color: x / Appearance: x / SG: x / pH: x  Gluc: 98 mg/dL / Ketone: x  / Bili: x / Urobili: x   Blood: x / Protein: x / Nitrite: x   Leuk Esterase: x / RBC: x / WBC x   Sq Epi: x / Non Sq Epi: x / Bacteria: x

## 2023-06-27 NOTE — PROGRESS NOTE ADULT - PROBLEM SELECTOR PLAN 1
Likely iron deficiency anemia in the setting of blood loss s/p surgical procedure and now with vaginal bleeding in the setting of known hematosalpinx. Patient also admits to melena over the past few days  -s/p 3 units pRBC Hb 9.6 today   - hemodynamics acceptable this morning  - iron studies ordered, PO ferrous sulfate ordered   - endoscopy held given patients respiratory complaints   - FOBT ordered, f/u results

## 2023-06-27 NOTE — PROGRESS NOTE ADULT - PROBLEM SELECTOR PLAN 8
"Right lower lobe nodules measuring up to 1.2 cm. Follow-up chest CT in 3 months is recommended."  - outpatient follow-up

## 2023-06-27 NOTE — PROGRESS NOTE ADULT - SUBJECTIVE AND OBJECTIVE BOX
Patient is a 32y old  Female who presents with a chief complaint of symptomatic anemia (27 Jun 2023 12:24)      INTERVAL HPI/OVERNIGHT EVENTS:   Patient seen and examined at bedside. Continues to complain of cp, sob, R flank pain. States she has not BM since admission. Also reports that Dr. Stinson sent her a message on [employer] which was shown to me. According to the surgeon, patient should be sitting on a zero gravity chair instead of the bed, apply Nitrobid cream mixed with aquaphor on her b/l breast. Per my phone call with the surgeon yesterday, patient should also be on Lovenox. Encouraged patient to call Dr. Stinson office. Attempted to call plastic surgeons office at bedside however was placed on long hold without response. Patient stated that she will call again later today.     MEDICATIONS  (STANDING):  albuterol/ipratropium for Nebulization 3 milliLiter(s) Nebulizer every 8 hours  AQUAPHOR (petrolatum Ointment) 1 Application(s) Topical two times a day  bacitracin   Ointment 1 Application(s) Topical two times a day  bisacodyl 5 milliGRAM(s) Oral daily  cephalexin 500 milliGRAM(s) Oral four times a day  enoxaparin Injectable 40 milliGRAM(s) SubCutaneous every 24 hours  ferrous    sulfate 325 milliGRAM(s) Oral daily  gabapentin 100 milliGRAM(s) Oral three times a day  lactulose Syrup 20 Gram(s) Oral once  lidocaine   4% Patch 1 Patch Transdermal daily  magnesium hydroxide Suspension 30 milliLiter(s) Oral daily  naloxone Injectable 0.4 milliGRAM(s) IV Push once  nitroglycerin    2% Ointment 0.25 Inch(s) Transdermal two times a day  pantoprazole    Tablet 40 milliGRAM(s) Oral before breakfast  polyethylene glycol 3350 17 Gram(s) Oral daily  senna 2 Tablet(s) Oral at bedtime    MEDICATIONS  (PRN):  acetaminophen     Tablet .. 650 milliGRAM(s) Oral every 6 hours PRN Temp greater or equal to 38C (100.4F), Mild Pain (1 - 3)  albuterol    90 MICROgram(s) HFA Inhaler 2 Puff(s) Inhalation every 4 hours PRN Shortness of Breath and/or Wheezing  ALPRAZolam 0.25 milliGRAM(s) Oral every 8 hours PRN anxiety or spasm  aluminum hydroxide/magnesium hydroxide/simethicone Suspension 30 milliLiter(s) Oral every 4 hours PRN Dyspepsia  melatonin 3 milliGRAM(s) Oral at bedtime PRN Insomnia  ondansetron Injectable 4 milliGRAM(s) IV Push every 8 hours PRN Nausea and/or Vomiting  oxyCODONE    IR 10 milliGRAM(s) Oral every 6 hours PRN Severe Pain (7 - 10)  oxyCODONE    IR 5 milliGRAM(s) Oral every 6 hours PRN Moderate Pain (4 - 6)  simethicone 80 milliGRAM(s) Chew five times a day PRN Gas      Allergies    No Known Allergies    Intolerances        REVIEW OF SYSTEMS:  CONSTITUTIONAL: No fever or chills  HEENT:  No headache, no sore throat  RESPIRATORY: +sob  CARDIOVASCULAR: +chest pain   GASTROINTESTINAL: No abd pain, nausea, vomiting, or diarrhea  GENITOURINARY: +right flank pain   NEUROLOGICAL: no focal weakness or dizziness  MUSCULOSKELETAL: + chest pall pain    Vital Signs Last 24 Hrs  T(C): 36.3 (27 Jun 2023 12:11), Max: 36.9 (26 Jun 2023 18:00)  T(F): 97.3 (27 Jun 2023 12:11), Max: 98.4 (26 Jun 2023 18:00)  HR: 89 (27 Jun 2023 12:11) (71 - 108)  BP: 142/90 (27 Jun 2023 12:11) (105/69 - 142/90)  BP(mean): --  RR: 17 (27 Jun 2023 12:11) (17 - 18)  SpO2: 96% (27 Jun 2023 12:11) (94% - 99%)    Parameters below as of 27 Jun 2023 12:11  Patient On (Oxygen Delivery Method): room air        PHYSICAL EXAM:  GENERAL: uncomfortable appearing   HEENT:  anicteric, moist mucous membranes  CHEST/LUNG:  normal breath sounds   HEART:  RRR, S1, S2  ABDOMEN:  BS+, soft, nt, nd, incisions without inc erythema/drainage  EXTREMITIES: no edema or calf ttp  NERVOUS SYSTEM: answers questions and follows commands appropriately  PSYCH: flat affect       LABS:                        9.6    7.64  )-----------( 365      ( 27 Jun 2023 06:53 )             30.8     CBC Full  -  ( 27 Jun 2023 06:53 )  WBC Count : 7.64 K/uL  Hemoglobin : 9.6 g/dL  Hematocrit : 30.8 %  Platelet Count - Automated : 365 K/uL  Mean Cell Volume : 90.3 fl  Mean Cell Hemoglobin : 28.2 pg  Mean Cell Hemoglobin Concentration : 31.2 gm/dL  Auto Neutrophil # : x  Auto Lymphocyte # : x  Auto Monocyte # : x  Auto Eosinophil # : x  Auto Basophil # : x  Auto Neutrophil % : x  Auto Lymphocyte % : x  Auto Monocyte % : x  Auto Eosinophil % : x  Auto Basophil % : x    27 Jun 2023 06:53    137    |  104    |  12     ----------------------------<  98     4.1     |  29     |  0.57     Ca    8.7        27 Jun 2023 06:53  Phos  3.8       27 Jun 2023 06:53  Mg     2.3       27 Jun 2023 06:53    TPro  6.8    /  Alb  2.7    /  TBili  0.8    /  DBili  x      /  AST  48     /  ALT  56     /  AlkPhos  73     27 Jun 2023 06:53      Urinalysis Basic - ( 27 Jun 2023 06:53 )    Color: x / Appearance: x / SG: x / pH: x  Gluc: 98 mg/dL / Ketone: x  / Bili: x / Urobili: x   Blood: x / Protein: x / Nitrite: x   Leuk Esterase: x / RBC: x / WBC x   Sq Epi: x / Non Sq Epi: x / Bacteria: x      CAPILLARY BLOOD GLUCOSE            Culture - Urine (collected 06-24-23 @ 12:50)  Source: Clean Catch Clean Catch (Midstream)  Final Report (06-25-23 @ 22:29):    <10,000 CFU/mL Normal Urogenital Zoe    Culture - Blood (collected 06-23-23 @ 22:30)  Source: .Blood Blood  Preliminary Report (06-25-23 @ 04:02):    No growth to date.    Culture - Blood (collected 06-23-23 @ 22:20)  Source: .Blood Blood  Preliminary Report (06-25-23 @ 04:02):    No growth to date.        RADIOLOGY & ADDITIONAL TESTS:    Personally reviewed.     Consultant(s) Notes Reviewed:  [x] YES  [ ] NO

## 2023-06-27 NOTE — PROGRESS NOTE ADULT - PROBLEM SELECTOR PLAN 4
s/p liposuction, fat transfer on 6/20/23 in [place] with Dr. Stinson office number [phone]  - Contacted Dr. Stinson's office to discuss patient and side effects of procedure   - He stated patient did not present to any follow op appts and signed out AMA. During her procedure fat was transferred from abdomen to below the breast dermal skin. Patient was told to apply Nitrobid cream 2% to both breasts, take PO Keflex 500 mg four times a day for 10 days, inject 40 mg subQ Lovenox every other day, Percocet PRN for pain, and Zofran PRN for nausea. Patient also has a drain that is to be removed 7 days after procedure. He also stated that patient can call te office to discuss post op care over the phone.   - Informed patient to call Dr. Stinson's office and she stated she will call today   - chest wall and back pain management as above  - cont keflex 500mg qid for   - on Keflex 500 QID, last day 6/30/23  - Dr. Ayala consulted f/u recs  - Nitrobid cream ordered per Dr. Stinson's rec to prevent fat necrosis   - Lovenox ordered to prevent clotting per Dr. Stinson's recs

## 2023-06-27 NOTE — PROGRESS NOTE ADULT - ATTENDING COMMENTS
The patient was seen and evaluated independently by the attending physician.  - I have personally reviewed the pt's labs, imaging, micro data and consultant recommendations.     33 y/o F with pmhx HTN, subclinical hyperthyroidism (likely 2/2 ab negative Graves disease), anemia, thyroid nodules, hematosalpinx, known complex renal cysts presents with chest and back pain, admitted for symptomatic anemia.    - vital signs personally reviewed - HR dropped into 70’s for first time since admission today - pt clinically improving - improved pain - improved anxiety - overall general demeanor markedly improved - vitals reflective of this improvement  - lab results personally reviewed - h/h stabilized  - radiology images reviewed - reviewed CT C/A/P - no acute findings    #anemia, multifactorial  #chest pain, MSK  #rhabdo  #post-op care s/p cosmetic procedure  #HTN  #hematosalpinx  #pulmonary nodule    - modified bowel regimen today due to severely uncontrolled constipation - in addition to po options - added fleet prn which pt will attempt tonight - she was educated on the efficacy of its use  - pt requesting plastic surgery evaluation to help w/ suture removal and general assessment of surgical beds  - if pt moves bowels overnight and pt able to obtain improved pain relief would anticipate dispo plan for tomorrow  - for constipation will continue bisacodyl 5 milliGRAM(s) Oral daily  - for constipation will continue magnesium hydroxide Suspension 30 milliLiter(s) Oral daily  - for constipation will continue polyethylene glycol 3350 17 Gram(s) Oral daily  - for constipation will continue senna 2 Tablet(s) Oral at bedtime  - for back pain will continue lidocaine 4% Patch 1 Patch Transdermal daily  - for breast reconstructive surgery will continue AQUAPHOR (petrolatum Ointment) 1 Application(s) Topical two times a day - this is rec of plastic surgery  - for breast reconstructive surgery will continue bacitracin Ointment 1 Application(s) Topical two times a day - this is rec of plastic surgery  - for breast reconstructive surgery will continue nitroglycerin 2% Ointment 0.25 Inch(s) Transdermal two times a day - this is rec of plastic surgery  - for iron deficiency will continue ferrous sulfate 325 milliGRAM(s) Oral daily  - for neuropathy will continue gabapentin 100 milliGRAM(s) Oral three times a day  - for prophylactic abx will continue cephalexin 500 milliGRAM(s) Oral four times a day  - for vte ppx will continue enoxaparin Injectable 40 milliGRAM(s) SubCutaneous every 24 hours  - for wheezing will continue albuterol/ipratropium for Nebulization 3 milliLiter(s) Nebulizer every 8 hours  - oxycodone prn for severe pain  - aggressive bowl regimen - added fleet enema prn today  - supportive care - tylenol, albuterol, xanax, maalox, melatonion, simethicone, melatonin  - supplements - as above  - gi ppx - n/a  - vte ppx - added back lovenox per plastic surgery recs but pt declines - h/h is now stable and there are no hematomas on imaging - vte ppx was deemed safe today - pt declines pharmacologic vte ppx and she declined lovenox  - diet - reg  - code status - full  - dispo - possible dc tomorrow

## 2023-06-28 ENCOUNTER — TRANSCRIPTION ENCOUNTER (OUTPATIENT)
Age: 33
End: 2023-06-28

## 2023-06-28 VITALS
SYSTOLIC BLOOD PRESSURE: 137 MMHG | RESPIRATION RATE: 18 BRPM | DIASTOLIC BLOOD PRESSURE: 91 MMHG | HEART RATE: 86 BPM | TEMPERATURE: 98 F | OXYGEN SATURATION: 97 %

## 2023-06-28 LAB
ALBUMIN SERPL ELPH-MCNC: 2.8 G/DL — LOW (ref 3.3–5)
ALP SERPL-CCNC: 89 U/L — SIGNIFICANT CHANGE UP (ref 40–120)
ALT FLD-CCNC: 63 U/L — SIGNIFICANT CHANGE UP (ref 12–78)
ANION GAP SERPL CALC-SCNC: 5 MMOL/L — SIGNIFICANT CHANGE UP (ref 5–17)
AST SERPL-CCNC: 55 U/L — HIGH (ref 15–37)
BILIRUB SERPL-MCNC: 0.8 MG/DL — SIGNIFICANT CHANGE UP (ref 0.2–1.2)
BUN SERPL-MCNC: 12 MG/DL — SIGNIFICANT CHANGE UP (ref 7–23)
CALCIUM SERPL-MCNC: 9 MG/DL — SIGNIFICANT CHANGE UP (ref 8.5–10.1)
CHLORIDE SERPL-SCNC: 104 MMOL/L — SIGNIFICANT CHANGE UP (ref 96–108)
CO2 SERPL-SCNC: 28 MMOL/L — SIGNIFICANT CHANGE UP (ref 22–31)
CREAT SERPL-MCNC: 0.63 MG/DL — SIGNIFICANT CHANGE UP (ref 0.5–1.3)
EGFR: 119 ML/MIN/1.73M2 — SIGNIFICANT CHANGE UP
GLUCOSE SERPL-MCNC: 96 MG/DL — SIGNIFICANT CHANGE UP (ref 70–99)
HCT VFR BLD CALC: 28.5 % — LOW (ref 34.5–45)
HGB BLD-MCNC: 9.3 G/DL — LOW (ref 11.5–15.5)
MAGNESIUM SERPL-MCNC: 2.4 MG/DL — SIGNIFICANT CHANGE UP (ref 1.6–2.6)
MCHC RBC-ENTMCNC: 29.1 PG — SIGNIFICANT CHANGE UP (ref 27–34)
MCHC RBC-ENTMCNC: 32.6 GM/DL — SIGNIFICANT CHANGE UP (ref 32–36)
MCV RBC AUTO: 89.1 FL — SIGNIFICANT CHANGE UP (ref 80–100)
NRBC # BLD: 0 /100 WBCS — SIGNIFICANT CHANGE UP (ref 0–0)
PHOSPHATE SERPL-MCNC: 3.8 MG/DL — SIGNIFICANT CHANGE UP (ref 2.5–4.5)
PLATELET # BLD AUTO: 396 K/UL — SIGNIFICANT CHANGE UP (ref 150–400)
POTASSIUM SERPL-MCNC: 4.3 MMOL/L — SIGNIFICANT CHANGE UP (ref 3.5–5.3)
POTASSIUM SERPL-SCNC: 4.3 MMOL/L — SIGNIFICANT CHANGE UP (ref 3.5–5.3)
PROT SERPL-MCNC: 7.2 G/DL — SIGNIFICANT CHANGE UP (ref 6–8.3)
RBC # BLD: 3.2 M/UL — LOW (ref 3.8–5.2)
RBC # FLD: 16 % — HIGH (ref 10.3–14.5)
SODIUM SERPL-SCNC: 137 MMOL/L — SIGNIFICANT CHANGE UP (ref 135–145)
WBC # BLD: 7.91 K/UL — SIGNIFICANT CHANGE UP (ref 3.8–10.5)
WBC # FLD AUTO: 7.91 K/UL — SIGNIFICANT CHANGE UP (ref 3.8–10.5)

## 2023-06-28 PROCEDURE — 81001 URINALYSIS AUTO W/SCOPE: CPT

## 2023-06-28 PROCEDURE — 84466 ASSAY OF TRANSFERRIN: CPT

## 2023-06-28 PROCEDURE — 71045 X-RAY EXAM CHEST 1 VIEW: CPT

## 2023-06-28 PROCEDURE — 85025 COMPLETE CBC W/AUTO DIFF WBC: CPT

## 2023-06-28 PROCEDURE — 85610 PROTHROMBIN TIME: CPT

## 2023-06-28 PROCEDURE — 86850 RBC ANTIBODY SCREEN: CPT

## 2023-06-28 PROCEDURE — 74177 CT ABD & PELVIS W/CONTRAST: CPT | Mod: MA

## 2023-06-28 PROCEDURE — 94760 N-INVAS EAR/PLS OXIMETRY 1: CPT

## 2023-06-28 PROCEDURE — 82607 VITAMIN B-12: CPT

## 2023-06-28 PROCEDURE — 82550 ASSAY OF CK (CPK): CPT

## 2023-06-28 PROCEDURE — 96374 THER/PROPH/DIAG INJ IV PUSH: CPT | Mod: XU

## 2023-06-28 PROCEDURE — 71275 CT ANGIOGRAPHY CHEST: CPT | Mod: MA

## 2023-06-28 PROCEDURE — 74176 CT ABD & PELVIS W/O CONTRAST: CPT

## 2023-06-28 PROCEDURE — 80053 COMPREHEN METABOLIC PANEL: CPT

## 2023-06-28 PROCEDURE — 84443 ASSAY THYROID STIM HORMONE: CPT

## 2023-06-28 PROCEDURE — 84484 ASSAY OF TROPONIN QUANT: CPT

## 2023-06-28 PROCEDURE — 94640 AIRWAY INHALATION TREATMENT: CPT

## 2023-06-28 PROCEDURE — 85730 THROMBOPLASTIN TIME PARTIAL: CPT

## 2023-06-28 PROCEDURE — 99285 EMERGENCY DEPT VISIT HI MDM: CPT | Mod: 25

## 2023-06-28 PROCEDURE — 93005 ELECTROCARDIOGRAM TRACING: CPT

## 2023-06-28 PROCEDURE — 99239 HOSP IP/OBS DSCHRG MGMT >30: CPT | Mod: GC

## 2023-06-28 PROCEDURE — 96375 TX/PRO/DX INJ NEW DRUG ADDON: CPT

## 2023-06-28 PROCEDURE — 86901 BLOOD TYPING SEROLOGIC RH(D): CPT

## 2023-06-28 PROCEDURE — 82746 ASSAY OF FOLIC ACID SERUM: CPT

## 2023-06-28 PROCEDURE — 86923 COMPATIBILITY TEST ELECTRIC: CPT

## 2023-06-28 PROCEDURE — 36430 TRANSFUSION BLD/BLD COMPNT: CPT

## 2023-06-28 PROCEDURE — 83605 ASSAY OF LACTIC ACID: CPT

## 2023-06-28 PROCEDURE — 87040 BLOOD CULTURE FOR BACTERIA: CPT

## 2023-06-28 PROCEDURE — 83540 ASSAY OF IRON: CPT

## 2023-06-28 PROCEDURE — 85027 COMPLETE CBC AUTOMATED: CPT

## 2023-06-28 PROCEDURE — 83550 IRON BINDING TEST: CPT

## 2023-06-28 PROCEDURE — 36415 COLL VENOUS BLD VENIPUNCTURE: CPT

## 2023-06-28 PROCEDURE — 86900 BLOOD TYPING SEROLOGIC ABO: CPT

## 2023-06-28 PROCEDURE — 84100 ASSAY OF PHOSPHORUS: CPT

## 2023-06-28 PROCEDURE — 82728 ASSAY OF FERRITIN: CPT

## 2023-06-28 PROCEDURE — P9016: CPT

## 2023-06-28 PROCEDURE — 87086 URINE CULTURE/COLONY COUNT: CPT

## 2023-06-28 PROCEDURE — 82553 CREATINE MB FRACTION: CPT

## 2023-06-28 PROCEDURE — 84702 CHORIONIC GONADOTROPIN TEST: CPT

## 2023-06-28 PROCEDURE — 87635 SARS-COV-2 COVID-19 AMP PRB: CPT

## 2023-06-28 PROCEDURE — 83735 ASSAY OF MAGNESIUM: CPT

## 2023-06-28 PROCEDURE — 71250 CT THORAX DX C-: CPT

## 2023-06-28 RX ORDER — SIMETHICONE 80 MG/1
1 TABLET, CHEWABLE ORAL
Qty: 0 | Refills: 0 | DISCHARGE
Start: 2023-06-28

## 2023-06-28 RX ORDER — OXYCODONE HYDROCHLORIDE 5 MG/1
1 TABLET ORAL
Qty: 0 | Refills: 0 | DISCHARGE
Start: 2023-06-28

## 2023-06-28 RX ORDER — SENNA PLUS 8.6 MG/1
2 TABLET ORAL
Qty: 0 | Refills: 0 | DISCHARGE
Start: 2023-06-28

## 2023-06-28 RX ORDER — NITROGLYCERIN 6.5 MG
1 CAPSULE, EXTENDED RELEASE ORAL
Qty: 15 | Refills: 0
Start: 2023-06-28 | End: 2023-07-11

## 2023-06-28 RX ORDER — FERROUS SULFATE 325(65) MG
1 TABLET ORAL
Qty: 30 | Refills: 0
Start: 2023-06-28

## 2023-06-28 RX ORDER — OXYCODONE HYDROCHLORIDE 5 MG/1
1 TABLET ORAL
Qty: 10 | Refills: 0
Start: 2023-06-28

## 2023-06-28 RX ORDER — GABAPENTIN 400 MG/1
1 CAPSULE ORAL
Qty: 90 | Refills: 0
Start: 2023-06-28 | End: 2023-07-27

## 2023-06-28 RX ORDER — NALOXONE HYDROCHLORIDE 4 MG/.1ML
4 SPRAY NASAL
Qty: 1 | Refills: 0
Start: 2023-06-28

## 2023-06-28 RX ORDER — PANTOPRAZOLE SODIUM 20 MG/1
1 TABLET, DELAYED RELEASE ORAL
Qty: 30 | Refills: 0
Start: 2023-06-28

## 2023-06-28 RX ORDER — NITROGLYCERIN 6.5 MG
0.25 CAPSULE, EXTENDED RELEASE ORAL
Qty: 1 | Refills: 0
Start: 2023-06-28 | End: 2023-07-11

## 2023-06-28 RX ORDER — HYDRALAZINE HCL 50 MG
1 TABLET ORAL
Refills: 0 | DISCHARGE

## 2023-06-28 RX ORDER — BACITRACIN ZINC 500 UNIT/G
1 OINTMENT IN PACKET (EA) TOPICAL
Qty: 0 | Refills: 0 | DISCHARGE
Start: 2023-06-28

## 2023-06-28 RX ORDER — NITROGLYCERIN 6.5 MG
1 CAPSULE, EXTENDED RELEASE ORAL
Qty: 2 | Refills: 0
Start: 2023-06-28 | End: 2023-07-11

## 2023-06-28 RX ORDER — NITROGLYCERIN 6.5 MG
0 CAPSULE, EXTENDED RELEASE ORAL
Qty: 0 | Refills: 0 | DISCHARGE
Start: 2023-06-28

## 2023-06-28 RX ORDER — PANTOPRAZOLE SODIUM 20 MG/1
1 TABLET, DELAYED RELEASE ORAL
Qty: 0 | Refills: 0 | DISCHARGE
Start: 2023-06-28

## 2023-06-28 RX ADMIN — GABAPENTIN 100 MILLIGRAM(S): 400 CAPSULE ORAL at 06:15

## 2023-06-28 RX ADMIN — MAGNESIUM HYDROXIDE 30 MILLILITER(S): 400 TABLET, CHEWABLE ORAL at 11:02

## 2023-06-28 RX ADMIN — POLYETHYLENE GLYCOL 3350 17 GRAM(S): 17 POWDER, FOR SOLUTION ORAL at 11:02

## 2023-06-28 RX ADMIN — Medication 500 MILLIGRAM(S): at 06:15

## 2023-06-28 RX ADMIN — Medication 650 MILLIGRAM(S): at 04:18

## 2023-06-28 RX ADMIN — Medication 1 APPLICATION(S): at 06:16

## 2023-06-28 RX ADMIN — OXYCODONE HYDROCHLORIDE 5 MILLIGRAM(S): 5 TABLET ORAL at 00:11

## 2023-06-28 RX ADMIN — PANTOPRAZOLE SODIUM 40 MILLIGRAM(S): 20 TABLET, DELAYED RELEASE ORAL at 06:15

## 2023-06-28 RX ADMIN — OXYCODONE HYDROCHLORIDE 5 MILLIGRAM(S): 5 TABLET ORAL at 11:12

## 2023-06-28 RX ADMIN — OXYCODONE HYDROCHLORIDE 5 MILLIGRAM(S): 5 TABLET ORAL at 12:12

## 2023-06-28 RX ADMIN — LIDOCAINE 1 PATCH: 4 CREAM TOPICAL at 11:02

## 2023-06-28 RX ADMIN — Medication 0.25 INCH(S): at 06:34

## 2023-06-28 RX ADMIN — Medication 325 MILLIGRAM(S): at 11:04

## 2023-06-28 RX ADMIN — Medication 5 MILLIGRAM(S): at 11:04

## 2023-06-28 RX ADMIN — Medication 500 MILLIGRAM(S): at 11:04

## 2023-06-28 RX ADMIN — GABAPENTIN 100 MILLIGRAM(S): 400 CAPSULE ORAL at 14:21

## 2023-06-28 RX ADMIN — Medication 3 MILLILITER(S): at 08:15

## 2023-06-28 RX ADMIN — Medication 0.25 INCH(S): at 06:16

## 2023-06-28 RX ADMIN — Medication 650 MILLIGRAM(S): at 05:18

## 2023-06-28 NOTE — DISCHARGE NOTE NURSING/CASE MANAGEMENT/SOCIAL WORK - PATIENT PORTAL LINK FT
You can access the FollowMyHealth Patient Portal offered by [facility] by registering at the following website: [email or web address]. By joining [employer]’s FollowMyHealth portal, you will also be able to view your health information using other applications (apps) compatible with our system.

## 2023-06-28 NOTE — PROGRESS NOTE ADULT - ATTENDING COMMENTS
Patient seen at bedside  reports feeling better   would like to go home.   still with drain and three stitches in the back   comfortable on RA    Awaiting plastic's evaluation. ?drain removal.  symptomatic anemia. ? GIB. patient deferred inpatient EGD.   cont PPI. hgb stable   OOB as tolerated   outpatient follow up with CT chest Patient seen at bedside  reports feeling better   would like to go home.   still with drain and three stitches in the back   comfortable on RA    Awaiting plastic's evaluation. ?drain removal.  symptomatic anemia. ? GIB. patient deferred inpatient EGD.   cont PPI. hgb stable   OOB as tolerated   outpatient follow up with CT chest    discharge today.  drain removed with plastics today.

## 2023-06-28 NOTE — PROGRESS NOTE ADULT - PROBLEM SELECTOR PLAN 1
Likely iron deficiency anemia in the setting of blood loss s/p surgical procedure and now with vaginal bleeding in the setting of known hematosalpinx. Patient also admits to melena over the past few days  -s/p 3 units pRBC Hb 9.3 today   - hemodynamics acceptable this morning  - iron studies ordered, PO ferrous sulfate ordered   - endoscopy held given patients respiratory complaints   - FOBT ordered, f/u results

## 2023-06-28 NOTE — PROGRESS NOTE ADULT - PROBLEM SELECTOR PROBLEM 4
Status post cosmetic plastic surgery

## 2023-06-28 NOTE — PROGRESS NOTE ADULT - ASSESSMENT
32 year old female with history of HTN, subclinical hyperthyroidism (likely 2/2 ab negative Graves disease), anemia and thyroid nodules, hematosalpinx, known complex renal cysts presents with chest and back pain. Patient is s/p liposuction at [facility] w/ Dr. Stinson     pain  anemia  atelectasis  post op  plastic sx - abdominoplasty - breast surgery  thyroid disease hx  HTN  renal cysts  pregnancy induced Asthma Hx    NEBS  I [place]  dvt p  pain rx regimen - multimodal - Pain Consult - PMR  emotional support  wound care  assist with needs  ct reviewed - no acute path - atelectasis noted  Bowel rx regimen  Hgb trend noted  support and care in progress  discussed with med team and patient  TSH - wnl
anemia  gi bleed  multifactorial    plan  daily cbc   transfuse prn   consider hematology eval  check iron studies   ppi once a day  check stool occult blood  maalox 30cc po   NPO at midnight for Endoscopy tomorrow       Advanced care planning was discussed with patient and family.  Advanced care planning forms were reviewed and discussed.  Risks, benefits and alternatives of gastroenterologic procedures were discussed in detail and all questions were answered.    30 minutes spent.  
32 year old female with history of HTN, subclinical hyperthyroidism (likely 2/2 ab negative Graves disease), anemia and thyroid nodules, hematosalpinx, known complex renal cysts presents with chest and back pain. Patient is s/p liposuction at [facility] w/ Dr. Stinson     pain  anemia  atelectasis  post op  plastic sx - abdominoplasty - breast surgery  thyroid disease hx  HTN  renal cysts  pregnancy induced Asthma Hx    NEBS  I [place]  dvt p  pain rx regimen - multimodal - Pain Consult - PMR  emotional support  wound care  assist with needs  ct reviewed - no acute path - atelectasis noted  Bowel rx regimen  Hgb trend noted  support and care in progress  discussed with med team and patient  TSH - wnl
anemia  ?gi bleed  multifactorial    egd today cancelled due to respiratory symptoms  diet as tolerated  PPI  CT noted  pain control  bowel regimen, added lactulose  d/w pt and offered inpatient egd, she prefers to defer endoscopy for now, outpatient follow up with primary GI  will follow    I reviewed the overnight course of events on the unit, re-confirming the patient history. I discussed the care with the patient and their family  Differential diagnosis and plan of care discussed with patient after the evaluation  30 minutes spent on total encounter of which more than fifty percent of the encounter was spent counseling and/or coordinating care by the attending physician.  Advanced care planning was discussed with patient and family.  Advanced care planning forms were reviewed and discussed.  Risks, benefits and alternatives of gastroenterologic procedures were discussed in detail and all questions were answered.
anemia  ?gi bleed  multifactorial    egd today cancelled due to respiratory symptoms  diet as tolerated  PPI  CT noted  pain control  bowel regimen, added lactulose  d/w pt, who prefers to defer endoscopy for now, potentially can follow up as outpatient if hgb remains stable  will follow    I reviewed the overnight course of events on the unit, re-confirming the patient history. I discussed the care with the patient and their family  Differential diagnosis and plan of care discussed with patient after the evaluation  30 minutes spent on total encounter of which more than fifty percent of the encounter was spent counseling and/or coordinating care by the attending physician.  Advanced care planning was discussed with patient and family.  Advanced care planning forms were reviewed and discussed.  Risks, benefits and alternatives of gastroenterologic procedures were discussed in detail and all questions were answered.
anemia  ?gi bleed  multifactorial    egd today cancelled due to respiratory symptoms  diet as tolerated  PPI  follow up CT  may need egd once optimized if hgb drops  d/w pt   will follow    I reviewed the overnight course of events on the unit, re-confirming the patient history. I discussed the care with the patient and their family  Differential diagnosis and plan of care discussed with patient after the evaluation  30 minutes spent on total encounter of which more than fifty percent of the encounter was spent counseling and/or coordinating care by the attending physician.  Advanced care planning was discussed with patient and family.  Advanced care planning forms were reviewed and discussed.  Risks, benefits and alternatives of gastroenterologic procedures were discussed in detail and all questions were answered.  
33 y/o F with pmhx HTN, subclinical hyperthyroidism (likely 2/2 ab negative Graves disease), anemia, thyroid nodules, hematosalpinx, known complex renal cysts presents with chest and back pain, admitted for symptomatic anemia. 
31 y/o F with pmhx HTN, subclinical hyperthyroidism (likely 2/2 ab negative Graves disease), anemia, thyroid nodules, hematosalpinx, known complex renal cysts presents with chest and back pain, admitted for symptomatic anemia. 

## 2023-06-28 NOTE — CONSULT NOTE ADULT - SUBJECTIVE AND OBJECTIVE BOX
Chief Complaint:  Patient is a 32y old  Female who presents with a chief complaint of symptomatic anemia called to see pt for anemia  32 year old female with history of HTN, subclinical hyperthyroidism (likely 2/2 ab negative Graves disease), anemia and thyroid nodules, hematosalpinx, known complex renal cysts presents with chest and back pain. Patient is s/p liposuction at [facility] w/ Dr. Stinson with fat transfer to bilateral breasts on . She has been on a 10 day course of Keflex 500mg po QID since then for post-operative prophylaxis. She has been having some chest and back pain since the procedure but for the past one day she has been having severe right sided rib and back pain worse with deep breaths. She describes the pain as 20 out of 10 and that it feels like it "wiggles back and forth". Has been taking tylenol and oxycodone for pain at home. Patient states that she has a history of anemia in the past. Her Hgb was 12.5 before the surgery, and ~8.5 after the surgery, per pt. Patient notes that she has been having a days worth of abnormal vaginal bleeding, notes finding of hematosalpinx on recent MRI in may, and that she is to be scheduled for b/l salpingectomy. Feels that she may have had a fever. Had chest pain previously but is gone now. Denies abdominal pain, n/v/d, urinary changes, dizziness, headache.    ED course:  vitals - hr 98, bp 117/79, rr 20, t 99.2, spo2 98  labs - hgb 6.3  imaging - CTA / CTAP: No pulmonary embolism. Post surgical changes in the chest and abdominal wall. No drainable fluid collection identified. Right lower lobe nodules measuring up to 1.2 cm. Follow-up chest CT in 3 months is recommended.  EKG - sinus tach 114 no ischemic changes  meds given - NS bolus, zofran, morphine 4mg x1, prbc 2U       Review of Systems:  Review of Systems: CONSTITUTIONAL: +subjective fever, +chills, denies fatigue, weakness  HEENT: denies sore throat  SKIN: denies rashes  CARDIOVASCULAR: +chest pain, +palpitations, denies chest pressure  RESPIRATORY: denies shortness of breath, sputum production  GASTROINTESTINAL: denies nausea, vomiting, diarrhea, abdominal pain  GENITOURINARY: +abnormal vaginal bleeding, denies dysuria,  NEUROLOGICAL: denies numbness, headache, focal weakness  MUSCULOSKELETAL: denies new joint pain, muscle aches  HEMATOLOGIC: + vaginal bleeding  LYMPHATICS: denies extremity swelling    Allergies:  No Known Allergies      Medications:  acetaminophen     Tablet .. 650 milliGRAM(s) Oral every 6 hours PRN  aluminum hydroxide/magnesium hydroxide/simethicone Suspension 30 milliLiter(s) Oral every 4 hours PRN  bisacodyl 5 milliGRAM(s) Oral daily PRN  cephalexin 500 milliGRAM(s) Oral four times a day  hydrALAZINE 50 milliGRAM(s) Oral three times a day  HYDROmorphone  Injectable 0.25 milliGRAM(s) IV Push every 4 hours PRN  HYDROmorphone  Injectable 0.5 milliGRAM(s) IV Push every 4 hours PRN  lidocaine   4% Patch 1 Patch Transdermal daily  melatonin 3 milliGRAM(s) Oral at bedtime PRN  naloxone Injectable 0.4 milliGRAM(s) IV Push once  ondansetron Injectable 4 milliGRAM(s) IV Push every 8 hours PRN  pantoprazole    Tablet 40 milliGRAM(s) Oral before breakfast  polyethylene glycol 3350 17 Gram(s) Oral daily  senna 2 Tablet(s) Oral at bedtime      PMHX/PSHX:  No pertinent past medical history    No pertinent past medical history    Hypertension in pregnancy, preeclampsia    Asthma    Hypertension    Subclinical hyperthyroidism    Hematosalpinx    Anemia        History of dilation and curettage    No significant past surgical history    H/O abdominoplasty        Family history:  No pertinent family history in first degree relatives    Family history of myocardial infarction    FH: stroke        Social History:   no etoh no cigs no ivda    ROS:     General:  No wt loss, fevers, chills, night sweats, fatigue,   Eyes:  Good vision, no reported pain  ENT:  No sore throat, pain, runny nose, dysphagia  CV:  No pain, palpitations, hypo/hypertension  Resp:  No dyspnea, cough, tachypnea, wheezing  GI:  No pain, No nausea, No vomiting, No diarrhea, No constipation, No weight loss, No fever, No pruritis, No rectal bleeding, No tarry stools, No dysphagia,  :  No pain, bleeding, incontinence, nocturia  Muscle:  No pain, weakness  Neuro:  No weakness, tingling, memory problems  Psych:  No fatigue, insomnia, mood problems, depression  Endocrine:  No polyuria, polydipsia, cold/heat intolerance  Heme:  No petechiae, ecchymosis, easy bruisability  Skin:  No rash, tattoos, scars, edema      PHYSICAL EXAM:   Vital Signs:  Vital Signs Last 24 Hrs  T(C): 36.7 (2023 10:44), Max: 37.3 (2023 21:33)  T(F): 98 (2023 10:44), Max: 99.2 (2023 21:33)  HR: 94 (2023 10:44) (82 - 118)  BP: 104/71 (2023 10:44) (104/71 - 138/76)  BP(mean): --  RR: 18 (2023 10:44) (16 - 22)  SpO2: 98% (2023 10:44) (95% - 100%)    Parameters below as of 2023 10:44  Patient On (Oxygen Delivery Method): room air      Daily Height in cm: 160.02 (2023 21:33)    Daily     GENERAL:  Appears stated age, well-groomed, well-nourished, no distress  HEENT:  NC/AT,  conjunctivae clear and pink, no thyromegaly, nodules, adenopathy, no JVD, sclera -anicteric  CHEST:  Full & symmetric excursion, no increased effort, breath sounds clear  HEART:  Regular rhythm, S1, S2, no murmur/rub/S3/S4, no abdominal bruit, no edema  ABDOMEN:  Soft, non-tender, non-distended, normoactive bowel sounds,  no masses ,no hepato-splenomegaly, no signs of chronic liver disease  EXTEREMITIES:  no cyanosis,clubbing or edema  SKIN:  No rash/erythema/ecchymoses/petechiae/wounds/abscess/warm/dry  NEURO:  Alert, oriented, no asterixis, no tremor, no encephalopathy    LABS:                        6.3    7.94  )-----------( 230      ( 2023 22:30 )             20.1     06-23    139  |  107  |  8   ----------------------------<  111<H>  3.7   |  27  |  0.58    Ca    8.6      2023 22:30    TPro  6.4  /  Alb  2.9<L>  /  TBili  0.5  /  DBili  x   /  AST  95<H>  /  ALT  65  /  AlkPhos  63  06-23    LIVER FUNCTIONS - ( 2023 22:30 )  Alb: 2.9 g/dL / Pro: 6.4 g/dL / ALK PHOS: 63 U/L / ALT: 65 U/L / AST: 95 U/L / GGT: x           PT/INR - ( 2023 22:30 )   PT: 11.6 sec;   INR: 0.99 ratio         PTT - ( 2023 22:30 )  PTT:28.2 sec  Urinalysis Basic - ( 2023 22:30 )    Color: x / Appearance: x / SG: x / pH: x  Gluc: 111 mg/dL / Ketone: x  / Bili: x / Urobili: x   Blood: x / Protein: x / Nitrite: x   Leuk Esterase: x / RBC: x / WBC x   Sq Epi: x / Non Sq Epi: x / Bacteria: x          Imaging:          
Date/Time Patient Seen:  		  Referring MD:   Data Reviewed	       Patient is a 32y old  Female who presents with a chief complaint of symptomatic anemia (2023 15:40)      Subjective/HPI   32 year old female with history of HTN, subclinical hyperthyroidism (likely 2/2 ab negative Graves disease), anemia and thyroid nodules, hematosalpinx, known complex renal cysts presents with chest and back pain. Patient is s/p liposuction at [facility] w/ Dr. Stinson   PAST MEDICAL & SURGICAL HISTORY:  No pertinent past medical history    No pertinent past medical history    Hypertension in pregnancy, preeclampsia    Asthma  mild no intubations    Hypertension    Subclinical hyperthyroidism    Hematosalpinx    Anemia      1     History of dilation and curettage      No significant past surgical history    H/O abdominoplasty      PAST SURGICAL HISTORY:  H/O abdominoplasty     History of dilation and curettage .     FAMILY HISTORY:  Family history of myocardial infarction, Maternal grandfather  of MI  FH: stroke, Paternal grandmother.     Social History:  · Substance use	No  · Social History (marital status, living situation, occupation, and sexual history)	denies t/a/d  lives with  and 5 children     Tobacco Screening:  · Core Measure Site	Yes  · Has the patient used tobacco in the past 30 days?	No    Risk Assessment:    Present on Admission:  Deep Venous Thrombosis	no  Pulmonary Embolus	no     HIV Screening:  · In accordance with [place] State law, we offer every patient who comes to our ED an HIV test. Would you like to be tested today?	Opt out        Medication list         MEDICATIONS  (STANDING):  bacitracin   Ointment 1 Application(s) Topical two times a day  cephalexin 500 milliGRAM(s) Oral four times a day  ferrous    sulfate 325 milliGRAM(s) Oral daily  gabapentin 100 milliGRAM(s) Oral three times a day  lidocaine   4% Patch 1 Patch Transdermal daily  naloxone Injectable 0.4 milliGRAM(s) IV Push once  pantoprazole    Tablet 40 milliGRAM(s) Oral before breakfast  polyethylene glycol 3350 17 Gram(s) Oral daily  senna 2 Tablet(s) Oral at bedtime    MEDICATIONS  (PRN):  acetaminophen     Tablet .. 650 milliGRAM(s) Oral every 6 hours PRN Temp greater or equal to 38C (100.4F), Mild Pain (1 - 3)  aluminum hydroxide/magnesium hydroxide/simethicone Suspension 30 milliLiter(s) Oral every 4 hours PRN Dyspepsia  bisacodyl 5 milliGRAM(s) Oral daily PRN Constipation  melatonin 3 milliGRAM(s) Oral at bedtime PRN Insomnia  ondansetron Injectable 4 milliGRAM(s) IV Push every 8 hours PRN Nausea and/or Vomiting  oxyCODONE    IR 5 milliGRAM(s) Oral every 6 hours PRN Moderate Pain (4 - 6)  oxyCODONE    IR 10 milliGRAM(s) Oral every 6 hours PRN Severe Pain (7 - 10)  simethicone 80 milliGRAM(s) Chew five times a day PRN Gas         Vitals log        ICU Vital Signs Last 24 Hrs  T(C): 36.8 (2023 12:48), Max: 36.9 (2023 08:43)  T(F): 98.2 (2023 12:48), Max: 98.4 (2023 08:43)  HR: 71 (2023 12:48) (71 - 96)  BP: 105/69 (2023 12:48) (105/69 - 135/95)  BP(mean): --  ABP: --  ABP(mean): --  RR: 18 (2023 12:48) (17 - 18)  SpO2: 98% (2023 12:48) (93% - 98%)    O2 Parameters below as of 2023 12:48  Patient On (Oxygen Delivery Method): room air                 Input and Output:  I&O's Detail    2023 07:01  -  2023 07:00  --------------------------------------------------------  IN:    dextrose 5% + sodium chloride 0.45% w/ Additives: 75 mL  Total IN: 75 mL    OUT:    Bulb (mL): 15 mL    Voided (mL): 500 mL  Total OUT: 515 mL    Total NET: -440 mL          Lab Data                        9.1    7.05  )-----------( 325      ( 2023 07:05 )             28.8     06-    139  |  106  |  9   ----------------------------<  98  3.9   |  30  |  0.58    Ca    8.5      2023 07:05    TPro  6.5  /  Alb  2.7<L>  /  TBili  1.0  /  DBili  x   /  AST  37  /  ALT  45  /  AlkPhos  62              Review of Systems	    anxious  pain  on room air    Objective     Physical Examination    heart s1s2  lung dc BS  head nc  verbal  alert  cn grossly int  moves extr      Pertinent Lab findings & Imaging      Das:  NO   Adequate UO     I&O's Detail    2023 07:01  -  2023 07:00  --------------------------------------------------------  IN:    dextrose 5% + sodium chloride 0.45% w/ Additives: 75 mL  Total IN: 75 mL    OUT:    Bulb (mL): 15 mL    Voided (mL): 500 mL  Total OUT: 515 mL    Total NET: -440 mL               Discussed with:     Cultures:	        Radiology    ACC: [id number] EXAM:  CT ABDOMEN AND PELVIS   ORDERED BY: LUCY ALLEN     ACC: [id number] EXAM:  CT CHEST   ORDERED BY: LUCY ALLEN     PROCEDURE DATE:  2023          INTERPRETATION:  CLINICAL INFORMATION:  Right chest wall and right flank pain.  Postoperative abdominoplasty and liposuction 2023.  Anemia.    COMPARISON: CT scan chest abdomen pelvis 2023.    CONTRAST/COMPLICATIONS:  IV Contrast: NONE  Oral Contrast: NONE  Complications: None reported at time of study completion    PROCEDURE:  CT of the Chest, Abdomen and Pelvis was performed.  Sagittal and coronal reformats were performed.    FINDINGS:    CHEST:    LUNGS AND LARGE AIRWAYS: PLEURA:    Mild bibasilar dependent atelectasis.    No lobar lung consolidation, pleural effusion or pneumothorax.    The central airways are patent.    VESSELS: Within normal limits.    HEART: Heart size is normal. No pericardial effusion.    MEDIASTINUM AND CONY: No lymphadenopathy.    CHEST WALL AND LOWER NECK:  Small bilateral axillary lymph nodes, stable.    ABDOMEN AND PELVIS:    The evaluation of the solid organ parenchyma is limited without   intravenous contrast.    LIVER: Hepatomegaly.  BILE DUCTS: Normal caliber.  GALLBLADDER: Within normal limits.  SPLEEN: Within normal limits.  PANCREAS: Within normal limits.  ADRENALS: Within normal limits.  KIDNEYS/URETERS:  Right renal cyst.  No hydroureteronephrosis or obstructing ureteral calculus.    BLADDER: Within normal limits.  REPRODUCTIVE ORGANS: No pelvic mass.    BOWEL:  Small amount of high attenuation intraluminal content within the stomach.  Right-sided colonic fecal retention.   No bowel obstruction.   Appendix is normal.  PERITONEUM: No ascites.    VESSELS: Within normal limits.  RETROPERITONEUM/LYMPH NODES:   No lymphadenopathy.  No retroperitoneal hematoma.    ABDOMINAL WALL:  Postsurgical changes.  Persistent drainage catheter left lower anterior abdominal wall.  Generalized subcutaneous body wall edema posterior flank and buttocks.  No localized subcutaneous fluid collection.    BONES:  No acute osseous abnormality.    IMPRESSION:    No acute pulmonary process.    No acute visceral organ injury, given limitations of noncontrast exam.    Postsurgical changes in and generalized body wall edema.    Other findings as discussed above.    --- End of Report ---            CHAO CONDON MD; Attending Radiologist  This document has been electronically signed. 2023  2:37PM                          
SURGERY CONSULT NOTE:      HPI FROM ED:  HPI:  32 year old female with history of HTN, subclinical hyperthyroidism (likely 2/2 ab negative Graves disease), anemia and thyroid nodules, hematosalpinx, known complex renal cysts presents with chest and back pain. Patient is s/p liposuction at [facility] w/ Dr. Stinson with fat transfer to bilateral breasts on . She has been on a 10 day course of Keflex 500mg po QID since then for post-operative prophylaxis. She has been having some chest and back pain since the procedure but for the past one day she has been having severe right sided rib and back pain worse with deep breaths. She describes the pain as 20 out of 10 and that it feels like it "wiggles back and forth". Has been taking tylenol and oxycodone for pain at home. Patient states that she has a history of anemia in the past. Her Hgb was 12.5 before the surgery, and ~8.5 after the surgery, per pt. Patient notes that she has been having a days worth of abnormal vaginal bleeding, notes finding of hematosalpinx on recent MRI in may, and that she is to be scheduled for b/l salpingectomy. Feels that she may have had a fever. Had chest pain previously but is gone now. Consult was requested for MIKEY drain removal. Pt seen and examined at bedside. Notes she would like her drain and two liposuction site sutures removed. Reports having prior abdominal pain/ muscle spasms s/p liposuction in [place] that has since resolved. Denies any SOB, chest pain, abdominal pain, n/v/d.       ED course:  vitals - hr 98, bp 117/79, rr 20, t 99.2, spo2 98  labs - hgb 6.3  imaging - CTA / CTAP: No pulmonary embolism. Post surgical changes in the chest and abdominal wall. No drainable fluid collection identified. Right lower lobe nodules measuring up to 1.2 cm. Follow-up chest CT in 3 months is recommended.  EKG - sinus tach 114 no ischemic changes  meds given - NS bolus, zofran, morphine 4mg x1, prbc 2U (2023 02:42)      PAST MEDICAL HISTORY:  PAST MEDICAL & SURGICAL HISTORY:  Hypertension in pregnancy, preeclampsia      Asthma  mild no intubations      Hypertension      Subclinical hyperthyroidism      Hematosalpinx      Anemia      History of dilation and curettage        H/O abdominoplasty            PAST SURGICAL HISTORY:    REVIEW OF SYSTEMS:  General/Constitutional: No acute distress, no headache, weakness, fevers, or chills   HEENT: Denies auditory or visual changes/disturbances, no vertigo, no throat pain, no dysphagia    Neck: Denies neck pain/stiffness, denies swelling/lumps/hoarseness   Lymphatic: Denies lumps or swelling in the axillae, groin, or neck bilaterally   Respiratory: Denies cough/hemoptysis, denies wheezing/SOB/dyspnea  Cardiac: Denies chest pain, palpitations  Abdomen: Denies abdominal bloating/fullness, nausea or vomiting, denies abdominal pain  Extremities: Denies sores, swelling, discoloration bilat UE/LE  Genitourinary: Denies urinary issues or complaints, denies dysuria/hematuria  Neuro: Denies weakness, paraesthesias, paralysis, syncope, loss of vision  Skin: Denies pruritus, pain, rashes  Psych: Denies hallucinations, visual disturbances, or depression    MEDICATIONS:  Home Medications:  aluminum hydroxide-magnesium hydroxide 200 mg-200 mg/5 mL oral suspension: 30 milliliter(s) orally every 4 hours As needed Dyspepsia (2023 14:18)  bacitracin 500 units/g topical ointment: 1 Apply topically to affected area 2 times a day (2023 14:18)  cephalexin 500 mg oral capsule: 1 cap(s) orally 4 times a day (2023 03:17)  senna leaf extract oral tablet: 2 tab(s) orally once a day (at bedtime) (2023 14:18)  simethicone 80 mg oral tablet, chewable: 1 tab(s) orally 5 times a day As needed Gas (2023 14:18)    MEDICATIONS  (STANDING):  albuterol/ipratropium for Nebulization 3 milliLiter(s) Nebulizer every 8 hours  AQUAPHOR (petrolatum Ointment) 1 Application(s) Topical two times a day  bacitracin   Ointment 1 Application(s) Topical two times a day  bisacodyl 5 milliGRAM(s) Oral daily  cephalexin 500 milliGRAM(s) Oral four times a day  enoxaparin Injectable 40 milliGRAM(s) SubCutaneous every 24 hours  ferrous    sulfate 325 milliGRAM(s) Oral daily  gabapentin 100 milliGRAM(s) Oral three times a day  lidocaine   4% Patch 1 Patch Transdermal daily  magnesium hydroxide Suspension 30 milliLiter(s) Oral daily  naloxone Injectable 0.4 milliGRAM(s) IV Push once  nitroglycerin    2% Ointment 0.25 Inch(s) Transdermal two times a day  pantoprazole    Tablet 40 milliGRAM(s) Oral before breakfast  polyethylene glycol 3350 17 Gram(s) Oral daily  senna 2 Tablet(s) Oral at bedtime    MEDICATIONS  (PRN):  acetaminophen     Tablet .. 650 milliGRAM(s) Oral every 6 hours PRN Temp greater or equal to 38C (100.4F), Mild Pain (1 - 3)  albuterol    90 MICROgram(s) HFA Inhaler 2 Puff(s) Inhalation every 4 hours PRN Shortness of Breath and/or Wheezing  ALPRAZolam 0.25 milliGRAM(s) Oral every 8 hours PRN anxiety or spasm  aluminum hydroxide/magnesium hydroxide/simethicone Suspension 30 milliLiter(s) Oral every 4 hours PRN Dyspepsia  melatonin 3 milliGRAM(s) Oral at bedtime PRN Insomnia  ondansetron Injectable 4 milliGRAM(s) IV Push every 8 hours PRN Nausea and/or Vomiting  oxyCODONE    IR 5 milliGRAM(s) Oral every 6 hours PRN Moderate Pain (4 - 6)  oxyCODONE    IR 10 milliGRAM(s) Oral every 6 hours PRN Severe Pain (7 - 10)  saline laxative (FLEET) Rectal Enema 1 Enema Rectal daily PRN constipation  simethicone 80 milliGRAM(s) Chew five times a day PRN Gas      ALLERGIES:  Allergies    No Known Allergies    Intolerances        SOCIAL HISTORY:  Social History:  denies t/a/d  lives with  and 5 children (2023 02:42)    Smoking: Yes [ ]  No [ ]   ______pk yrs  ETOH  Yes [ ]  No [ ]  Social [ ]  DRUGS:  Yes [ ]  No [ ]  if so what______________    FAMILY HISTORY:  FAMILY HISTORY:  Family history of myocardial infarction  Maternal grandfather  of MI    FH: stroke  Paternal grandmother        VITAL SIGNS:  Vital Signs Last 24 Hrs  T(C): 36.7 (2023 12:55), Max: 37.4 (2023 20:38)  T(F): 98.1 (2023 12:55), Max: 99.3 (2023 20:38)  HR: 86 (2023 12:55) (68 - 105)  BP: 137/91 (2023 12:55) (126/83 - 146/100)  BP(mean): --  RR: 18 (2023 12:55) (17 - 18)  SpO2: 97% (2023 12:55) (94% - 98%)    Parameters below as of 2023 12:55  Patient On (Oxygen Delivery Method): room air        PHYSICAL EXAM:  General: No acute distress, appears comfortable, well-groomed, appears stated age  Chest: Large, pendulous breasts with a small, liposuction incision site in B/L IMFs, C/D/I  Abdomen: lidocaine patch in RUQ, MIKEY drain (with ~5cc serosanguinous fluid) in right suprapubic region palpated into central upper quadrant, abdomen soft, nontender, no rebound guarding  Back: ecchymosis, predominantly over lower back/upper buttock region with 2 non-absorbable, clear, simple interrupted sutures in place         LABS:                        9.3    7.91  )-----------( 396      ( 2023 06:50 )             28.5         137  |  104  |  12  ----------------------------<  96  4.3   |  28  |  0.63    Ca    9.0      2023 06:50  Phos  3.8       Mg     2.4         TPro  7.2  /  Alb  2.8<L>  /  TBili  0.8  /  DBili  x   /  AST  55<H>  /  ALT  63  /  AlkPhos  89        Urinalysis Basic - ( 2023 06:50 )    Color: x / Appearance: x / SG: x / pH: x  Gluc: 96 mg/dL / Ketone: x  / Bili: x / Urobili: x   Blood: x / Protein: x / Nitrite: x   Leuk Esterase: x / RBC: x / WBC x   Sq Epi: x / Non Sq Epi: x / Bacteria: x      LIVER FUNCTIONS - ( 2023 06:50 )  Alb: 2.8 g/dL / Pro: 7.2 g/dL / ALK PHOS: 89 U/L / ALT: 63 U/L / AST: 55 U/L / GGT: x

## 2023-06-28 NOTE — PROGRESS NOTE ADULT - PROBLEM SELECTOR PLAN 7
MRI Pelvis 5/15/23: L hematosalpinx, suspect contributory to current presentation   - currently menstruating, monitor for worsening of baseline vaginal bleeding  - anemia and transfusion management as above
"Right lower lobe nodules measuring up to 1.2 cm. Follow-up chest CT in 3 months is recommended."  - outpatient follow-up
"Right lower lobe nodules measuring up to 1.2 cm. Follow-up chest CT in 3 months is recommended."  - outpatient follow-up
MRI Pelvis 5/15/23: L hematosalpinx, suspect contributory to current presentation   - currently menstruating, monitor for worsening of baseline vaginal bleeding  - anemia and transfusion management as above

## 2023-06-28 NOTE — PROGRESS NOTE ADULT - SUBJECTIVE AND OBJECTIVE BOX
Date/Time Patient Seen:  		  Referring MD:   Data Reviewed	       Patient is a 32y old  Female who presents with a chief complaint of symptomatic anemia (2023 12:34)      Subjective/HPI     PAST MEDICAL & SURGICAL HISTORY:  No pertinent past medical history    No pertinent past medical history    Hypertension in pregnancy, preeclampsia    Asthma  mild no intubations    Hypertension    Subclinical hyperthyroidism    Hematosalpinx    Anemia      1     History of dilation and curettage      No significant past surgical history    H/O abdominoplasty            Medication list         MEDICATIONS  (STANDING):  albuterol/ipratropium for Nebulization 3 milliLiter(s) Nebulizer every 8 hours  AQUAPHOR (petrolatum Ointment) 1 Application(s) Topical two times a day  bacitracin   Ointment 1 Application(s) Topical two times a day  bisacodyl 5 milliGRAM(s) Oral daily  cephalexin 500 milliGRAM(s) Oral four times a day  enoxaparin Injectable 40 milliGRAM(s) SubCutaneous every 24 hours  ferrous    sulfate 325 milliGRAM(s) Oral daily  gabapentin 100 milliGRAM(s) Oral three times a day  lidocaine   4% Patch 1 Patch Transdermal daily  magnesium hydroxide Suspension 30 milliLiter(s) Oral daily  naloxone Injectable 0.4 milliGRAM(s) IV Push once  nitroglycerin    2% Ointment 0.25 Inch(s) Transdermal two times a day  pantoprazole    Tablet 40 milliGRAM(s) Oral before breakfast  polyethylene glycol 3350 17 Gram(s) Oral daily  senna 2 Tablet(s) Oral at bedtime    MEDICATIONS  (PRN):  acetaminophen     Tablet .. 650 milliGRAM(s) Oral every 6 hours PRN Temp greater or equal to 38C (100.4F), Mild Pain (1 - 3)  albuterol    90 MICROgram(s) HFA Inhaler 2 Puff(s) Inhalation every 4 hours PRN Shortness of Breath and/or Wheezing  ALPRAZolam 0.25 milliGRAM(s) Oral every 8 hours PRN anxiety or spasm  aluminum hydroxide/magnesium hydroxide/simethicone Suspension 30 milliLiter(s) Oral every 4 hours PRN Dyspepsia  melatonin 3 milliGRAM(s) Oral at bedtime PRN Insomnia  ondansetron Injectable 4 milliGRAM(s) IV Push every 8 hours PRN Nausea and/or Vomiting  oxyCODONE    IR 5 milliGRAM(s) Oral every 6 hours PRN Moderate Pain (4 - 6)  oxyCODONE    IR 10 milliGRAM(s) Oral every 6 hours PRN Severe Pain (7 - 10)  saline laxative (FLEET) Rectal Enema 1 Enema Rectal daily PRN constipation  simethicone 80 milliGRAM(s) Chew five times a day PRN Gas         Vitals log        ICU Vital Signs Last 24 Hrs  T(C): 36.9 (2023 04:44), Max: 37.4 (2023 20:38)  T(F): 98.4 (2023 04:44), Max: 99.3 (2023 20:38)  HR: 95 (2023 04:44) (68 - 105)  BP: 126/83 (2023 04:44) (126/83 - 146/100)  BP(mean): --  ABP: --  ABP(mean): --  RR: 17 (2023 04:44) (17 - 17)  SpO2: 97% (:44) (94% - 98%)    O2 Parameters below as of 2023 04:44  Patient On (Oxygen Delivery Method): room air                 Input and Output:  I&O's Detail    2023 07:01  -  2023 07:00  --------------------------------------------------------  IN:  Total IN: 0 mL    OUT:    Bulb (mL): 50 mL  Total OUT: 50 mL    Total NET: -50 mL          Lab Data                        9.6    7.64  )-----------( 365      ( 2023 06:53 )             30.8     06-27    137  |  104  |  12  ----------------------------<  98  4.1   |  29  |  0.57    Ca    8.7      2023 06:53  Phos  3.8     06-27  Mg     2.3     0627    TPro  6.8  /  Alb  2.7<L>  /  TBili  0.8  /  DBili  x   /  AST  48<H>  /  ALT  56  /  AlkPhos  73              Review of Systems	      Objective     Physical Examination    head nc  head at  verbal  alert  cn grossly int  no wheeze      Pertinent Lab findings & Imaging      Pippa:  NO   Adequate UO     I&O's Detail    2023 07:01  -  2023 07:00  --------------------------------------------------------  IN:  Total IN: 0 mL    OUT:    Bulb (mL): 50 mL  Total OUT: 50 mL    Total NET: -50 mL               Discussed with:     Cultures:	        Radiology

## 2023-06-28 NOTE — PROGRESS NOTE ADULT - SUBJECTIVE AND OBJECTIVE BOX
Patient is a 32y old  Female who presents with a chief complaint of symptomatic anemia (28 Jun 2023 05:09)      INTERVAL HPI/OVERNIGHT EVENTS:   Patient seen and examined at bedside. No overnight events occurred. Patient has no complaints at this time, states pain is improved. States she had a BM after the fleet enema. States she is ready to go home, requests removal of her back stitches and prescription for pain/anxiety medication.  Denies fevers, chills, headache, lightheadedness, chest pain, dyspnea, abdominal pain, n/v/d/c.    MEDICATIONS  (STANDING):  albuterol/ipratropium for Nebulization 3 milliLiter(s) Nebulizer every 8 hours  AQUAPHOR (petrolatum Ointment) 1 Application(s) Topical two times a day  bacitracin   Ointment 1 Application(s) Topical two times a day  bisacodyl 5 milliGRAM(s) Oral daily  cephalexin 500 milliGRAM(s) Oral four times a day  enoxaparin Injectable 40 milliGRAM(s) SubCutaneous every 24 hours  ferrous    sulfate 325 milliGRAM(s) Oral daily  gabapentin 100 milliGRAM(s) Oral three times a day  lidocaine   4% Patch 1 Patch Transdermal daily  magnesium hydroxide Suspension 30 milliLiter(s) Oral daily  naloxone Injectable 0.4 milliGRAM(s) IV Push once  nitroglycerin    2% Ointment 0.25 Inch(s) Transdermal two times a day  pantoprazole    Tablet 40 milliGRAM(s) Oral before breakfast  polyethylene glycol 3350 17 Gram(s) Oral daily  senna 2 Tablet(s) Oral at bedtime    MEDICATIONS  (PRN):  acetaminophen     Tablet .. 650 milliGRAM(s) Oral every 6 hours PRN Temp greater or equal to 38C (100.4F), Mild Pain (1 - 3)  albuterol    90 MICROgram(s) HFA Inhaler 2 Puff(s) Inhalation every 4 hours PRN Shortness of Breath and/or Wheezing  ALPRAZolam 0.25 milliGRAM(s) Oral every 8 hours PRN anxiety or spasm  aluminum hydroxide/magnesium hydroxide/simethicone Suspension 30 milliLiter(s) Oral every 4 hours PRN Dyspepsia  melatonin 3 milliGRAM(s) Oral at bedtime PRN Insomnia  ondansetron Injectable 4 milliGRAM(s) IV Push every 8 hours PRN Nausea and/or Vomiting  oxyCODONE    IR 5 milliGRAM(s) Oral every 6 hours PRN Moderate Pain (4 - 6)  oxyCODONE    IR 10 milliGRAM(s) Oral every 6 hours PRN Severe Pain (7 - 10)  saline laxative (FLEET) Rectal Enema 1 Enema Rectal daily PRN constipation  simethicone 80 milliGRAM(s) Chew five times a day PRN Gas      Allergies    No Known Allergies    Intolerances        REVIEW OF SYSTEMS:  CONSTITUTIONAL: No fever or chills  HEENT:  No headache, no sore throat  RESPIRATORY: No cough, wheezing, or shortness of breath  CARDIOVASCULAR: No chest pain, palpitations  GASTROINTESTINAL: No abd pain, nausea, vomiting, or diarrhea  GENITOURINARY: No dysuria, frequency, or hematuria  NEUROLOGICAL: no focal weakness or dizziness  MUSCULOSKELETAL: +muscle spasms     Vital Signs Last 24 Hrs  T(C): 36.9 (28 Jun 2023 04:44), Max: 37.4 (27 Jun 2023 20:38)  T(F): 98.4 (28 Jun 2023 04:44), Max: 99.3 (27 Jun 2023 20:38)  HR: 82 (28 Jun 2023 08:24) (68 - 105)  BP: 139/84 (28 Jun 2023 06:53) (126/83 - 146/100)  BP(mean): --  RR: 17 (28 Jun 2023 04:44) (17 - 17)  SpO2: 98% (28 Jun 2023 08:24) (94% - 98%)    Parameters below as of 28 Jun 2023 08:24  Patient On (Oxygen Delivery Method): room air        PHYSICAL EXAM:  GENERAL: NAD  HEENT:  anicteric, moist mucous membranes  CHEST/LUNG:  normal breath sounds   HEART:  RRR, S1, S2  ABDOMEN:  BS+, soft, nt, nd, incisions without inc erythema/drainage  EXTREMITIES: no edema or calf ttp  NERVOUS SYSTEM: answers questions and follows commands appropriately      LABS:                        9.3    7.91  )-----------( 396      ( 28 Jun 2023 06:50 )             28.5     CBC Full  -  ( 28 Jun 2023 06:50 )  WBC Count : 7.91 K/uL  Hemoglobin : 9.3 g/dL  Hematocrit : 28.5 %  Platelet Count - Automated : 396 K/uL  Mean Cell Volume : 89.1 fl  Mean Cell Hemoglobin : 29.1 pg  Mean Cell Hemoglobin Concentration : 32.6 gm/dL  Auto Neutrophil # : x  Auto Lymphocyte # : x  Auto Monocyte # : x  Auto Eosinophil # : x  Auto Basophil # : x  Auto Neutrophil % : x  Auto Lymphocyte % : x  Auto Monocyte % : x  Auto Eosinophil % : x  Auto Basophil % : x    28 Jun 2023 06:50    137    |  104    |  12     ----------------------------<  96     4.3     |  28     |  0.63     Ca    9.0        28 Jun 2023 06:50  Phos  3.8       28 Jun 2023 06:50  Mg     2.4       28 Jun 2023 06:50    TPro  7.2    /  Alb  2.8    /  TBili  0.8    /  DBili  x      /  AST  55     /  ALT  63     /  AlkPhos  89     28 Jun 2023 06:50      Urinalysis Basic - ( 28 Jun 2023 06:50 )    Color: x / Appearance: x / SG: x / pH: x  Gluc: 96 mg/dL / Ketone: x  / Bili: x / Urobili: x   Blood: x / Protein: x / Nitrite: x   Leuk Esterase: x / RBC: x / WBC x   Sq Epi: x / Non Sq Epi: x / Bacteria: x      CAPILLARY BLOOD GLUCOSE            Culture - Urine (collected 06-24-23 @ 12:50)  Source: Clean Catch Clean Catch (Midstream)  Final Report (06-25-23 @ 22:29):    <10,000 CFU/mL Normal Urogenital Zoe    Culture - Blood (collected 06-23-23 @ 22:30)  Source: .Blood Blood  Preliminary Report (06-28-23 @ 05:00):    No growth at 4 days    Culture - Blood (collected 06-23-23 @ 22:20)  Source: .Blood Blood  Preliminary Report (06-28-23 @ 05:00):    No growth at 4 days        RADIOLOGY & ADDITIONAL TESTS:    Personally reviewed.     Consultant(s) Notes Reviewed:  [x] YES  [ ] NO

## 2023-06-28 NOTE — DISCHARGE NOTE NURSING/CASE MANAGEMENT/SOCIAL WORK - NSDCPEFALRISK_GEN_ALL_CORE
For information on Fall & Injury Prevention, visit: [email or web address] OR  [email or web address] OR  https://www.cdc.gov/steadi/patient.html

## 2023-06-28 NOTE — PROGRESS NOTE ADULT - PROBLEM SELECTOR PLAN 4
s/p liposuction, fat transfer on 6/20/23 in [place] with Dr. Stinson office number [phone]  - Contacted Dr. Stinson's office to discuss patient and side effects of procedure   - He stated patient did not present to any follow op appts and signed out AMA. During her procedure fat was transferred from abdomen to below the breast dermal skin. Patient was told to apply Nitrobid cream 2% to both breasts, take PO Keflex 500 mg four times a day for 10 days, inject 40 mg subQ Lovenox every other day, Percocet PRN for pain, and Zofran PRN for nausea. Patient also has a drain that is to be removed 7 days after procedure. He also stated that patient can call te office to discuss post op care over the phone.   - Informed patient to call Dr. Stinson's office and she stated she will call today   - chest wall and back pain management as above  - cont keflex 500mg qid for   - on Keflex 500 QID, last day 6/30/23  - Dr. Ayala consulted f/u recs  - Nitrobid cream ordered per Dr. Stinson's rec to prevent fat necrosis   - Lovenox ordered to prevent clotting per Dr. Stinson's recs

## 2023-06-28 NOTE — PROGRESS NOTE ADULT - PROBLEM SELECTOR PLAN 9
Lovenox ordered  Although patient reported that she signed out AMA post op bc the Lovenox she was administered cause her dizziness
Lovenox ordered--> patient refusing   Patient reported that she signed out AMA post op bc the Lovenox she was administered cause her dizziness

## 2023-06-28 NOTE — PROGRESS NOTE ADULT - SUBJECTIVE AND OBJECTIVE BOX
[facility]  Rigoberto Montanez PA-C  [address] [place] [address]  [phone]      INTERVAL HPI/OVERNIGHT EVENTS:  Pt s/e  Reports feeling improved, flank pain improving  Tolerating diet  No overt GI bleeding    MEDICATIONS  (STANDING):  albuterol/ipratropium for Nebulization 3 milliLiter(s) Nebulizer every 8 hours  AQUAPHOR (petrolatum Ointment) 1 Application(s) Topical two times a day  bacitracin   Ointment 1 Application(s) Topical two times a day  bisacodyl 5 milliGRAM(s) Oral daily  cephalexin 500 milliGRAM(s) Oral four times a day  enoxaparin Injectable 40 milliGRAM(s) SubCutaneous every 24 hours  ferrous    sulfate 325 milliGRAM(s) Oral daily  gabapentin 100 milliGRAM(s) Oral three times a day  lidocaine   4% Patch 1 Patch Transdermal daily  magnesium hydroxide Suspension 30 milliLiter(s) Oral daily  naloxone Injectable 0.4 milliGRAM(s) IV Push once  nitroglycerin    2% Ointment 0.25 Inch(s) Transdermal two times a day  pantoprazole    Tablet 40 milliGRAM(s) Oral before breakfast  polyethylene glycol 3350 17 Gram(s) Oral daily  senna 2 Tablet(s) Oral at bedtime    MEDICATIONS  (PRN):  acetaminophen     Tablet .. 650 milliGRAM(s) Oral every 6 hours PRN Temp greater or equal to 38C (100.4F), Mild Pain (1 - 3)  albuterol    90 MICROgram(s) HFA Inhaler 2 Puff(s) Inhalation every 4 hours PRN Shortness of Breath and/or Wheezing  ALPRAZolam 0.25 milliGRAM(s) Oral every 8 hours PRN anxiety or spasm  aluminum hydroxide/magnesium hydroxide/simethicone Suspension 30 milliLiter(s) Oral every 4 hours PRN Dyspepsia  melatonin 3 milliGRAM(s) Oral at bedtime PRN Insomnia  ondansetron Injectable 4 milliGRAM(s) IV Push every 8 hours PRN Nausea and/or Vomiting  oxyCODONE    IR 5 milliGRAM(s) Oral every 6 hours PRN Moderate Pain (4 - 6)  oxyCODONE    IR 10 milliGRAM(s) Oral every 6 hours PRN Severe Pain (7 - 10)  saline laxative (FLEET) Rectal Enema 1 Enema Rectal daily PRN constipation  simethicone 80 milliGRAM(s) Chew five times a day PRN Gas      Allergies    No Known Allergies      PHYSICAL EXAM:   Vital Signs:  Vital Signs Last 24 Hrs  T(C): 36.9 (2023 04:44), Max: 37.4 (2023 20:38)  T(F): 98.4 (2023 04:44), Max: 99.3 (2023 20:38)  HR: 82 (2023 08:24) (68 - 105)  BP: 139/84 (2023 06:53) (126/83 - 146/100)  BP(mean): --  RR: 17 (2023 04:44) (17 - 17)  SpO2: 98% (2023 08:24) (94% - 98%)    Parameters below as of 2023 08:24  Patient On (Oxygen Delivery Method): room air      Daily     Daily Weight in k.6 (2023 04:44)    GENERAL:  Appears stated age  HEENT:  NC/AT  CHEST:  Full & symmetric excursion  HEART:  Regular rhythm  ABDOMEN:  Soft, non-tender, non-distended  EXTEREMITIES:  no cyanosis  SKIN:  No rash  NEURO:  Alert      LABS:                        9.3    7.91  )-----------( 396      ( 2023 06:50 )             28.5     06-28    137  |  104  |  12  ----------------------------<  96  4.3   |  28  |  0.63    Ca    9.0      2023 06:50  Phos  3.8     06-  Mg     2.4     -28    TPro  7.2  /  Alb  2.8<L>  /  TBili  0.8  /  DBili  x   /  AST  55<H>  /  ALT  63  /  AlkPhos  89  06-28      Urinalysis Basic - ( 2023 06:50 )    Color: x / Appearance: x / SG: x / pH: x  Gluc: 96 mg/dL / Ketone: x  / Bili: x / Urobili: x   Blood: x / Protein: x / Nitrite: x   Leuk Esterase: x / RBC: x / WBC x   Sq Epi: x / Non Sq Epi: x / Bacteria: x

## 2023-06-28 NOTE — PROGRESS NOTE ADULT - PROBLEM SELECTOR PLAN 3
- CK 2800 -> 2400, likely component of post operative myositis, no evidence of renal injury

## 2023-06-28 NOTE — PROGRESS NOTE ADULT - PROVIDER SPECIALTY LIST ADULT
Gastroenterology
Pulmonology
Pulmonology
Gastroenterology
Hospitalist

## 2023-06-28 NOTE — CONSULT NOTE ADULT - ASSESSMENT
32 year old female with history of HTN, subclinical hyperthyroidism (likely 2/2 ab negative Graves disease), anemia and thyroid nodules, hematosalpinx, known complex renal cysts presents with chest and back pain. Patient is s/p liposuction at [facility] w/ Dr. Stinson     pain  anemia  atelectasis  post op  plastic sx - abdominoplasty - breast surgery  thyroid disease hx  HTN  renal cysts  pregnancy induced Asthma Hx    NEBS  I francheska  dvt p  pain rx regimen - multimodal - Pain Consult - PMR  emotional support  wound care  assist with needs  ct reviewed - no acute path - atelectasis noted  Bowel rx regimen  Hgb trend noted  support and care in progress  discussed with med team and patient  TSH - wnl  
A/P:    32 year old female with recent hx of liposuction at [facility] with fat transfer to B/L breasts on 06/20/23, requesting plastics consult for MIKEY drain removal.   Pt has been draining 25cc/24 hours.  MIKEY drain removed. Tolerated well. No complications.   Two liposuction site, simple-interrupted sutures in the back are not ready to be removed yet. Instructed pt to follow up in our office to have sutures removed next Thursday, 07/06/23.   Pt verbalized understanding and is agreeable with the plan.  Dispo per primary team 
anemia  gi bleed  multifactorial    plan  daily cbc   transfuse prn   consider hematology eval  check iron studies   ppi once a day  check stool occult blood  further recommendations pending above  maalox 30cc po     Advanced care planning was discussed with patient and family.  Advanced care planning forms were reviewed and discussed.  Risks, benefits and alternatives of gastroenterologic procedures were discussed in detail and all questions were answered.    30 minutes spent.

## 2023-06-29 LAB
CULTURE RESULTS: SIGNIFICANT CHANGE UP
CULTURE RESULTS: SIGNIFICANT CHANGE UP
SPECIMEN SOURCE: SIGNIFICANT CHANGE UP
SPECIMEN SOURCE: SIGNIFICANT CHANGE UP

## 2023-07-13 NOTE — OB RN PATIENT PROFILE - NS_PRENATALCARE_OBGYN_ALL_OB
Outreach attempt was made to schedule a Medicare Wellness Visit. This was the second attempt. Contact was not made, left message.     Yes

## 2023-09-19 PROBLEM — E05.90 THYROTOXICOSIS, UNSPECIFIED WITHOUT THYROTOXIC CRISIS OR STORM: Chronic | Status: ACTIVE | Noted: 2023-06-24

## 2023-09-19 PROBLEM — D64.9 ANEMIA, UNSPECIFIED: Chronic | Status: ACTIVE | Noted: 2023-06-24

## 2023-09-19 PROBLEM — N83.6 HEMATOSALPINX: Chronic | Status: ACTIVE | Noted: 2023-06-24

## 2023-09-20 ENCOUNTER — APPOINTMENT (OUTPATIENT)
Dept: ENDOCRINOLOGY | Facility: CLINIC | Age: 33
End: 2023-09-20

## 2023-09-22 ENCOUNTER — NON-APPOINTMENT (OUTPATIENT)
Age: 33
End: 2023-09-22

## 2023-09-26 NOTE — ED PROVIDER NOTE - NSICDXPASTSURGICALHX_GEN_ALL_CORE_FT
Hx: pt s/p renal transplant approx 2wks ago with ongoing dysuria since transplant but noted high blood sugars daily for several days.  Denies any fever, cp, sob, ab pain (does have some R lower ab discomfort postoperative, not increased or worsening).  Pt states he's compliant with meds including 2 oral agents for DM and insulin (unsure which type) 10units TID (not on SSI).  Pt also noted on records to be on antirejection meds, antibiotics, and prednisone 5mg.     PE: well appearing, nontoxic, no respiratory distress.  Neuro nonfocal.  Skin intact. Psych normal mood.  Drain tube noted RLQ with 5cc of blood tinged fluid.   Mild td RLQ, no guarding or rebound  Ambulatory.  Clear lungs, RRR.    MDM: postoperative state R kidney transplant with hyperglycemia, persistent dysuria.  check cbc r/o anemia or leukocytosis, check bmp to r/o metabolic derangement and lyte imbalance, urinalysis, transplant consult, consider infection vs need for medication adjustment
PAST SURGICAL HISTORY:  H/O abdominoplasty 2020    History of dilation and curettage 2014

## 2023-10-06 ENCOUNTER — APPOINTMENT (OUTPATIENT)
Dept: OBGYN | Facility: CLINIC | Age: 33
End: 2023-10-06

## 2023-11-20 ENCOUNTER — APPOINTMENT (OUTPATIENT)
Dept: ENDOCRINOLOGY | Facility: CLINIC | Age: 33
End: 2023-11-20

## 2023-11-27 ENCOUNTER — NON-APPOINTMENT (OUTPATIENT)
Age: 33
End: 2023-11-27

## 2024-02-05 ENCOUNTER — NON-APPOINTMENT (OUTPATIENT)
Age: 34
End: 2024-02-05

## 2024-02-12 ENCOUNTER — APPOINTMENT (OUTPATIENT)
Dept: CT IMAGING | Facility: CLINIC | Age: 34
End: 2024-02-12
Payer: COMMERCIAL

## 2024-02-12 PROCEDURE — 71250 CT THORAX DX C-: CPT

## 2024-02-26 ENCOUNTER — APPOINTMENT (OUTPATIENT)
Dept: ENDOCRINOLOGY | Facility: CLINIC | Age: 34
End: 2024-02-26
Payer: COMMERCIAL

## 2024-02-26 VITALS
HEIGHT: 63 IN | DIASTOLIC BLOOD PRESSURE: 84 MMHG | OXYGEN SATURATION: 98 % | BODY MASS INDEX: 34.73 KG/M2 | SYSTOLIC BLOOD PRESSURE: 120 MMHG | HEART RATE: 77 BPM | WEIGHT: 196 LBS

## 2024-02-26 DIAGNOSIS — E04.2 NONTOXIC MULTINODULAR GOITER: ICD-10-CM

## 2024-02-26 DIAGNOSIS — E05.00 THYROTOXICOSIS WITH DIFFUSE GOITER W/OUT THYROTOXIC CRISIS OR STORM: ICD-10-CM

## 2024-02-26 DIAGNOSIS — E03.8 OTHER SPECIFIED HYPOTHYROIDISM: ICD-10-CM

## 2024-02-26 PROCEDURE — 99215 OFFICE O/P EST HI 40 MIN: CPT

## 2024-02-26 RX ORDER — LEVOTHYROXINE SODIUM 0.03 MG/1
25 TABLET ORAL
Qty: 90 | Refills: 2 | Status: ACTIVE | COMMUNITY
Start: 2024-02-26 | End: 1900-01-01

## 2024-02-26 NOTE — PATIENT PROFILE ADULT - FALLEN IN THE PAST
February 26, 2024    King Botello  [address]  [facility] [address]             [facility]  [address] [facility]  [place] [address]  Phone: [phone]                                  [facility]   [address] [facility]   [place] [address]  [phone]         [facility]   [address] [place] [address] [phone]         [facility]    [address]  [place] [address] [phone]   Re: King Botello  Clinic number: [id number]    Dear Ms. Botello:    You were recently admitted to an [facility] facility for cardiac (heart) problem.  Your physician has referred you to [facility].  Cardiac Rehab Phase 2 is an educational and exercise program, conducted in a outpatient setting, proven to help reduce your risk for recurrent heart events.    Cardiac rehab has two major parts:    1. Exercise training to help you achieve cardiovascular fitness while learning how to exercise safely and improve muscle strength and endurance.  Your exercise prescription will be based on the results of the cardiopulmonary stress test (CPX) which will be done before entering the program and at completion.  2. Education, counseling and training to help you understand your heart condition and find ways to reduce your risk of future heart problems.  The cardiac rehab team will help you learn how to cope with the stress of adjusting to a new lifestyle and to deal with your fears about the future.    Phase 2 is a 36-session program, meeting 3 times a week for 12 weeks.  Each session consists of an hour of exercise and half-hour dedicated to the educational topic of the day.  Class days vary per location.  Please contact your nearest facility for details.    Through cardiac rehab you will learn:  About your heart condition, medical therapies, and medication  Risk factors in y our lifestyle contributing to  heart disease  New strategies to modify your risk factors  About a healthy diet that can lower your blood cholesterol, control weight, help prevent or control high blood pressure, and diabetes  How to stop smoking  How to manage stress    If you are interested in getting started, call the [facility] of your choosing.     Sincerely,     [facility]          no

## 2024-02-27 LAB
ESTIMATED AVERAGE GLUCOSE: 111 MG/DL
HBA1C MFR BLD HPLC: 5.5 %

## 2024-03-21 ENCOUNTER — NON-APPOINTMENT (OUTPATIENT)
Age: 34
End: 2024-03-21

## 2024-04-09 NOTE — ED ADULT NURSE NOTE - CAS TRG GEN SKIN CONDITION
Speech-Language Pathology    SLP Adult IRF CCR Dysphagia Treatment     Patient Name: Trinity Hernandez  MRN: [id number]  Today's Date: 4/9/2024  Time Calculation  Start Time: 1110  Stop Time: 1155  Time Calculation (min): 45 min     3/4sw    Current Problem:   Ischemic stroke, right body involvement (left brain)     SLP Assessment:  SLP TX Intervention Outcome: Making Progress Towards Goals  SLP Assessment Results: Other (Comment) (dysphagia)  Prognosis: Good  Treatment Provided: Yes   Treatment Tolerance: Patient limited by fatigue  Medical Staff Made Aware: Yes  Strengths: Family/Caregiver Suppport  Barriers: Comorbidities     Plan:  Inpatient/Swing Bed or Outpatient: Inpatient  Treatment/Interventions: Other (Comment) (dysphagia)  SLP TX Plan: Continue Plan of Care  SLP Plan: Skilled SLP  SLP Frequency: 4x per week  Duration: Current admission  SLP Discharge Recommendations: Continue skilled SLP services at the next level of care  Next Treatment Priority: diet tolerance, strat's, OPEs  Discussed POC: Patient, Nursing  Discussed Risks/Benefits: Yes, Patient, Nursing  Patient/Caregiver Agreeable: Yes      Subjective   Pt seen upright in w/c. 2L O2, baseline per pt.     General Visit Information:   Reason for Referral: f/u dysphagia tx  Referred By: Dr. Cruz  Past Medical History Relevant to Rehab: COPD, CAD s/p PCI 10/4/23, NSVT, HTN, HLD, rheumatoid arthritis, and hypothyroidism, CHF, Afib (not on AC), AAA, asthma, COPD, chronic respiratory failure 2/2 pulmonary sarcoidosis, Migraines, Hiatal Hernia, and Diverticulosis.  Prior to Session Communication: Bedside nurse      Pain Assessment:   Pain Score: 4  Pain Location: Generalized      Objective   DYSPHAGIA GOALS initiated 3/29, anticipated end 4/19:  Pt will tolerate tsp trials of pureed textures with SLP only to 90% without overt s/s aspiration.  PROGRESS: RN cleared pt for p.o. trials with SLP. 3 ice chips and approx. 1-2 oz of applesauce via tsp after oral  care. 4/8 - GOAL MET  STATUS: Pt c/o pain during swallow d/t dobhoff. RN aware. Breathing treatment provided some relief. Pt was able to self-feed after instruction for smaller amounts. No overt s/s aspiration were noted. 4/8 - GOAL MET  2. Pt will utilize chin-tuck, triple effortful swallow with SLP only trials to 100%.              PROGRESS: 90% - 100% with cues, with SLP trials. 4/8 - GOAL MET  STATUS: Pt is affected by pain during swallow. Less facial grimacing today. Able to complete chin-tuck followed by 3 swallows after SLP provides cues to initiate and continue through trials. 4/8 - GOAL MET  Pt will increase swallow reflex via OPEs to 80% accuracy.  PROGRESS: 75% with reduced cues.   STATUS: CTAR x20. Pitch glides X15. Phonation with increased consistency 3-6 sec. . However, phonation decreases as task progresses. BOT elevation x15. Tongue Pops x15. Cues for breath support, pacing. Ongoing education to monitor breath support. Ami, x3. Supraglottic sequence x4.  Pt will improve swallow reflex via OMEs to 80% accuracy.              PROGRESS: 80% with min cues 4/8: GOAL MET  STATUS: Able to complete sets of x10 labial protrusion/retraction, lingual lateralization,  buccal protrusion/retraction. Reduced cues for pacing, full ROM. 4/8: GOAL MET  Ongoing assessment via MBSS when indicated to reassess swallow function.  PROGRESS: Discussed MBSS results with pt, spouse, and Mae JOSÉ RN. Notified Dr. Cruz via [employer] Secure Chat of MBSS results.  STATUS: 4/5 - GOAL MET  6.  Pt will tolerate pureed and moderately-thick/honey-thick textures to 90% without overt s/s aspiration.              PROGRESS: Established 4/5, anticipated end 4/19. 4/8: 80%  STATUS: No reported s/s aspiration. Pt seen with moderately-thick orange juice during tx session. No overt s/s aspiration. Provided education RE: labels. Explained that moderately-thick is aka honey-thick (I.e. yellow label vs pink).   7. Pt will use safe swallow  strategies to 90% accuracy with cues during meals and/or snacks.              PROGRESS: Established 4/5, anticipated end 4/19 4/8: 80%-85%   STATUS: Pt utilizes slow rate. Recalls upright position and to remain upright after p.o. intake. Occasional cues for subsequent swallow.     Therapeutic Swallow:  Therapeutic Swallow Intervention : Compensatory Strategies, PO Trials, Pharyngeal Strengthening Techniques  Pharyngeal Strengthening Techniques: Effortful Swallow, Chin Tuck Against Resistance, Supraglottic Swallow, Pitch Glides, Ami Maneuver  Solid Diet Recommendations: Pureed/extremely thick  (IDDSI Level 4)  Liquid Diet Recommendations: Honey thick/liquidised/moderately thick (IDDS Level 3)  Swallow Comments: med's crushed in pureed      Inpatient Education:  Adult Inpatient Education  Individual(s) Educated: Patient  Written Education : reviewed handout for swallowing exercises  Verbal Education : goals, progress, monitor breat support  Risk and Benefits Discussed with Patient/Caregiver/Other: yes  Patient/Caregiver Demonstrated Understanding: yes  Plan of Care Discussed and Agreed Upon: yes  Patient Response to Education: Patient/Caregiver Verbalized Understanding of Information                      Warm

## 2024-04-11 ENCOUNTER — APPOINTMENT (OUTPATIENT)
Dept: ULTRASOUND IMAGING | Facility: CLINIC | Age: 34
End: 2024-04-11
Payer: COMMERCIAL

## 2024-04-11 ENCOUNTER — OUTPATIENT (OUTPATIENT)
Dept: OUTPATIENT SERVICES | Facility: HOSPITAL | Age: 34
LOS: 1 days | End: 2024-04-11
Payer: COMMERCIAL

## 2024-04-11 ENCOUNTER — APPOINTMENT (OUTPATIENT)
Dept: ULTRASOUND IMAGING | Facility: CLINIC | Age: 34
End: 2024-04-11

## 2024-04-11 ENCOUNTER — APPOINTMENT (OUTPATIENT)
Dept: MAMMOGRAPHY | Facility: CLINIC | Age: 34
End: 2024-04-11

## 2024-04-11 DIAGNOSIS — E04.2 NONTOXIC MULTINODULAR GOITER: ICD-10-CM

## 2024-04-11 DIAGNOSIS — Z98.89 OTHER SPECIFIED POSTPROCEDURAL STATES: Chronic | ICD-10-CM

## 2024-04-11 DIAGNOSIS — Z98.890 OTHER SPECIFIED POSTPROCEDURAL STATES: Chronic | ICD-10-CM

## 2024-04-11 PROCEDURE — 77062 BREAST TOMOSYNTHESIS BI: CPT | Mod: 26

## 2024-04-11 PROCEDURE — 76536 US EXAM OF HEAD AND NECK: CPT

## 2024-04-11 PROCEDURE — 76641 ULTRASOUND BREAST COMPLETE: CPT | Mod: 26,50

## 2024-04-11 PROCEDURE — 77066 DX MAMMO INCL CAD BI: CPT

## 2024-04-11 PROCEDURE — 76536 US EXAM OF HEAD AND NECK: CPT | Mod: 26

## 2024-04-11 PROCEDURE — 76641 ULTRASOUND BREAST COMPLETE: CPT

## 2024-04-11 PROCEDURE — G0279: CPT

## 2024-04-11 PROCEDURE — 77066 DX MAMMO INCL CAD BI: CPT | Mod: 26

## 2024-05-16 NOTE — ED ADULT NURSE NOTE - NS PRO PASSIVE SMOKE EXP
2:38 PM called patient to check on symptoms. He is very dizzy and weak. Does not want to go to ER. Does not have a way to check his BP. He also reports lower back pain and HA keeping him up at night. Will get ESR, CRP, MRI brain and L Spine, UA and will order glucometer for home use. Will collaborate with PCP as soon as she is available this afternoon. -ALEE BURKETT     
Attempted to call pt -- no answer left message to call back at earliest convenience   
Scheduled patient for labs and mri on today.   
No

## 2024-06-26 ENCOUNTER — APPOINTMENT (OUTPATIENT)
Dept: OBGYN | Facility: CLINIC | Age: 34
End: 2024-06-26
Payer: COMMERCIAL

## 2024-06-26 VITALS
BODY MASS INDEX: 33.66 KG/M2 | SYSTOLIC BLOOD PRESSURE: 110 MMHG | WEIGHT: 190 LBS | HEIGHT: 63 IN | DIASTOLIC BLOOD PRESSURE: 68 MMHG

## 2024-06-26 DIAGNOSIS — N64.4 MASTODYNIA: ICD-10-CM

## 2024-06-26 DIAGNOSIS — N60.19 DIFFUSE CYSTIC MASTOPATHY OF UNSPECIFIED BREAST: ICD-10-CM

## 2024-06-26 PROCEDURE — 99213 OFFICE O/P EST LOW 20 MIN: CPT

## 2024-06-26 RX ORDER — NAPROXEN 500 MG/1
500 TABLET ORAL
Qty: 60 | Refills: 3 | Status: ACTIVE | COMMUNITY
Start: 2024-06-26 | End: 1900-01-01

## 2024-07-05 ENCOUNTER — NON-APPOINTMENT (OUTPATIENT)
Age: 34
End: 2024-07-05

## 2024-07-15 NOTE — OB PROVIDER H&P - NS_FHRVARIABILITY_OBGYN_ALL_OB
Patient is alert and oriented times 4, able to communicate needs. No changes during this shift, patient tolerate medications and care well, no distress.   Moderate Variability

## 2024-07-19 NOTE — ED PROVIDER NOTE - MEDICAL DECISION MAKING DETAILS
Continued Stay SW/CM Assessment/Plan of Care Note     Progress note:  CM reviewed patient record, patient had R chest tube inserted yesterday. DCP is to return home on discharge, CM will continue to follow for DC needs.    See SW/CM flowsheets for other objective data.     First trimester vaginal bleeding - hemodynamically stable & well appearing (other DDx: most likely threatened , other , implantation bleeding vs less likely torsion or molar pregnancy)  Plan: 1) LABS/UA/HCG/T&S.  2) Transvaginal US.  3) reassess.  4) OBGYN consult if needed.

## 2024-07-31 ENCOUNTER — APPOINTMENT (OUTPATIENT)
Dept: OBGYN | Facility: CLINIC | Age: 34
End: 2024-07-31
Payer: COMMERCIAL

## 2024-07-31 ENCOUNTER — LABORATORY RESULT (OUTPATIENT)
Age: 34
End: 2024-07-31

## 2024-07-31 ENCOUNTER — APPOINTMENT (OUTPATIENT)
Dept: UROLOGY | Facility: CLINIC | Age: 34
End: 2024-07-31
Payer: COMMERCIAL

## 2024-07-31 VITALS
BODY MASS INDEX: 32.43 KG/M2 | WEIGHT: 183 LBS | SYSTOLIC BLOOD PRESSURE: 130 MMHG | HEIGHT: 63 IN | DIASTOLIC BLOOD PRESSURE: 80 MMHG

## 2024-07-31 DIAGNOSIS — N60.19 DIFFUSE CYSTIC MASTOPATHY OF UNSPECIFIED BREAST: ICD-10-CM

## 2024-07-31 DIAGNOSIS — N83.6 HEMATOSALPINX: ICD-10-CM

## 2024-07-31 DIAGNOSIS — Z11.3 ENCOUNTER FOR SCREENING FOR INFECTIONS WITH A PREDOMINANTLY SEXUAL MODE OF TRANSMISSION: ICD-10-CM

## 2024-07-31 DIAGNOSIS — R31.21 ASYMPTOMATIC MICROSCOPIC HEMATURIA: ICD-10-CM

## 2024-07-31 DIAGNOSIS — Z01.411 ENCOUNTER FOR GYNECOLOGICAL EXAMINATION (GENERAL) (ROUTINE) WITH ABNORMAL FINDINGS: ICD-10-CM

## 2024-07-31 DIAGNOSIS — N28.1 CYST OF KIDNEY, ACQUIRED: ICD-10-CM

## 2024-07-31 PROCEDURE — 99395 PREV VISIT EST AGE 18-39: CPT

## 2024-07-31 PROCEDURE — 99459 PELVIC EXAMINATION: CPT

## 2024-07-31 PROCEDURE — 99213 OFFICE O/P EST LOW 20 MIN: CPT

## 2024-07-31 PROCEDURE — 36415 COLL VENOUS BLD VENIPUNCTURE: CPT

## 2024-07-31 PROCEDURE — G2211 COMPLEX E/M VISIT ADD ON: CPT

## 2024-07-31 NOTE — PHYSICAL EXAM
[Chaperone Present] : A chaperone was present in the examining room during all aspects of the physical examination [[id number]] : A chaperone was present during the pelvic exam. [FreeTextEntry2] : ABIGAIL You [Appropriately responsive] : appropriately responsive [Alert] : alert [No Acute Distress] : no acute distress [Soft] : soft [Non-tender] : non-tender [Non-distended] : non-distended [No HSM] : No HSM [No Lesions] : no lesions [No Mass] : no mass [Oriented x3] : oriented x3 [Examination Of The Breasts] : a normal appearance [Breast Palpation Diffuse Fibrous Tissue Bilateral] : fibrocystic changes [No Masses] : no breast masses were palpable [Labia Majora] : normal [Labia Minora] : normal [Normal] : normal [Uterine Adnexae] : normal
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
## 2024-07-31 NOTE — DISCUSSION/SUMMARY
[FreeTextEntry1] : - Pap/HPV obtained today - STI testing offered; done - Mammo/Sono done this year; for b/l breast biopsy  - Pelvic sono referral rendered for hematosalpinx surveillance

## 2024-07-31 NOTE — LETTER BODY
[Dear  ___] : Dear  [unfilled], [Consult Letter:] : I had the pleasure of evaluating your patient, [unfilled]. [Consult Closing:] : Thank you very much for allowing me to participate in the care of this patient.  If you have any questions, please do not hesitate to contact me. [FreeTextEntry1] : [facility]\par  [address] \[place]  [place], [place], [address]  \par  [phone] \par

## 2024-07-31 NOTE — PHYSICAL EXAM
[General Appearance - Well Developed] : well developed [General Appearance - Well Nourished] : well nourished [Normal Appearance] : normal appearance [Well Groomed] : well groomed [] : no respiratory distress [Exaggerated Use Of Accessory Muscles For Inspiration] : no accessory muscle use [Oriented To Time, Place, And Person] : oriented to person, place, and time [Affect] : the affect was normal [Mood] : the mood was normal [Not Anxious] : not anxious

## 2024-07-31 NOTE — HISTORY OF PRESENT ILLNESS
[FreeTextEntry1] : 32yo P5 LMP 24 here for annual exam.  Has not yet gone for breast biopsy.   x 5 [No] : Patient does not have concerns regarding sex [Currently Active] : currently active [FreeTextEntry3] : Partner Vasectomy

## 2024-07-31 NOTE — ASSESSMENT
[FreeTextEntry1] : In follow-up to renal cyst, she will undergo renal ultrasound.  We will discuss the results on the phone.  Also urinalysis will be repeated.  Her workup with cystoscopy was done in 2022.  Pending results, she can follow-up in 6 months to 1 year.  Matty Pierre MD, FACS [facility]  of Urology  [address], [address] [place] [address]  Tel: [phone] Fax: [phone]

## 2024-08-01 LAB
APPEARANCE: CLEAR
BACTERIA: NEGATIVE /HPF
BILIRUBIN URINE: NEGATIVE
BLOOD URINE: ABNORMAL
CAST: 0 /LPF
COLOR: YELLOW
EPITHELIAL CELLS: 3 /HPF
GLUCOSE QUALITATIVE U: NEGATIVE MG/DL
KETONES URINE: NEGATIVE MG/DL
LEUKOCYTE ESTERASE URINE: NEGATIVE
MICROSCOPIC-UA: NORMAL
NITRITE URINE: NEGATIVE
PH URINE: 7.5
PROTEIN URINE: NEGATIVE MG/DL
RED BLOOD CELLS URINE: 2 /HPF
SPECIFIC GRAVITY URINE: 1.01
UROBILINOGEN URINE: 0.2 MG/DL
WHITE BLOOD CELLS URINE: 0 /HPF

## 2024-08-04 LAB
C TRACH RRNA SPEC QL NAA+PROBE: NOT DETECTED
HBV SURFACE AG SER QL: NONREACTIVE
HCV AB SER QL: NONREACTIVE
HCV S/CO RATIO: 0.15 S/CO
HIV1+2 AB SPEC QL IA.RAPID: NONREACTIVE
HPV HIGH+LOW RISK DNA PNL CVX: NOT DETECTED
N GONORRHOEA RRNA SPEC QL NAA+PROBE: NOT DETECTED
SOURCE TP AMPLIFICATION: NORMAL
T PALLIDUM AB SER QL IA: NEGATIVE

## 2024-08-05 ENCOUNTER — APPOINTMENT (OUTPATIENT)
Dept: ENDOCRINOLOGY | Facility: CLINIC | Age: 34
End: 2024-08-05

## 2024-08-05 PROCEDURE — 99214 OFFICE O/P EST MOD 30 MIN: CPT

## 2024-08-08 ENCOUNTER — NON-APPOINTMENT (OUTPATIENT)
Age: 34
End: 2024-08-08

## 2024-08-08 ENCOUNTER — APPOINTMENT (OUTPATIENT)
Dept: CARDIOLOGY | Facility: CLINIC | Age: 34
End: 2024-08-08

## 2024-08-08 PROBLEM — R06.00 DYSPNEA, UNSPECIFIED TYPE: Status: ACTIVE | Noted: 2024-08-08

## 2024-08-08 PROBLEM — I10 HTN (HYPERTENSION): Status: ACTIVE | Noted: 2024-08-08

## 2024-08-08 PROBLEM — E66.9 OBESITY (BMI 30-39.9): Status: ACTIVE | Noted: 2024-08-05

## 2024-08-08 PROCEDURE — 99214 OFFICE O/P EST MOD 30 MIN: CPT | Mod: 25

## 2024-08-08 PROCEDURE — G2211 COMPLEX E/M VISIT ADD ON: CPT | Mod: NC

## 2024-08-08 PROCEDURE — 93000 ELECTROCARDIOGRAM COMPLETE: CPT

## 2024-08-08 NOTE — CARDIOLOGY SUMMARY
[de-identified] : 8/8/24- Sinus 80, normal axis, no ST changes, QTc 415 [de-identified] : 2/18/22- LV EF 66%

## 2024-08-08 NOTE — HISTORY OF PRESENT ILLNESS
[FreeTextEntry1] : 33F h/o obesity (BMI 32), hyperthyroidism s/o MMI,  with preeclampsia in  was on labetalol and nifedipine, previously followed by Dr. Roger in  since switched to hydralazine 50mg BID, metoprolol 25mg and Lasix 20mg PRN since discontinued, recent visit with PCP told with abnormal EKG and intermittent dyspnea, refer for cardiology evaluation.   PCP Dr. Khanh Casper [phone]  Patient presents with her toddler daughter for the visit  Reports dyspnea randomly at rest, denies chest pain, no routine exercise, has loss about 13 lbs on semaglutide. Only taking metoprolol with home -140 and DBP 80s.   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

## 2024-08-08 NOTE — HISTORY OF PRESENT ILLNESS
[FreeTextEntry1] : 33F h/o obesity (BMI 32), hyperthyroidism s/o MMI,  with preeclampsia in  was on labetalol and nifedipine, previously followed by Dr. Roger in  since switched to hydralazine 50mg BID, metoprolol 25mg and Lasix 20mg PRN since discontinued, recent visit with PCP told with abnormal EKG and intermittent dyspnea, refer for cardiology evaluation.   PCP Dr. Khanh Casper [phone]  Patient presents with her toddler daughter for the visit  Reports dyspnea randomly at rest, denies chest pain, no routine exercise, has loss about 13 lbs on semaglutide. Only taking metoprolol with home -140 and DBP 80s.   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

## 2024-08-08 NOTE — DISCUSSION/SUMMARY
[FreeTextEntry1] : 33F h/o obesity (BMI 32), hyperthyroidism s/o MMI,  with preeclampsia in  was on labetalol and nifedipine, previously followed by Dr. Roger in  since switched to hydralazine 50mg BID, metoprolol 25mg and Lasix 20mg PRN since discontinued, recent visit with PCP told with abnormal EKG and intermittent dyspnea, refer for cardiology evaluation.   Intermittent nonexertional dyspnea EKG and cardiac exam normal likely related to deconditioning, will repeat Echocardiogram to reassess structural heart function. No anginal complains and low cardiac risk factors defer stress testing.   HTN- BP at goal on low dose metoprolol, can consider 1st line antihypertensive if becomes uncontrolled. Need weight loss, exercise and low salt diet.    Follow up as needed if Echo is normal.  [EKG obtained to assist in diagnosis and management of assessed problem(s)] : EKG obtained to assist in diagnosis and management of assessed problem(s)

## 2024-08-08 NOTE — CARDIOLOGY SUMMARY
[de-identified] : 8/8/24- Sinus 80, normal axis, no ST changes, QTc 415 [de-identified] : 2/18/22- LV EF 66%

## 2024-08-08 NOTE — HISTORY OF PRESENT ILLNESS
Progress Note:      Resting comfortably in bed, feels much better, minimal elft flank discomfort, no dysurias/hematuria    PAST MEDICAL & SURGICAL HISTORY:  Gastroesophageal reflux disease, esophagitis presence not specified  Hypertension  No significant past surgical history      MEDICATIONS  (STANDING):  ciprofloxacin   IVPB 400 milliGRAM(s) IV Intermittent every 12 hours  lactated ringers 1000 milliLiter(s) (75 mL/Hr) IV Continuous <Continuous>  lactobacillus acidophilus 1 Tablet(s) Oral two times a day with meals  pantoprazole    Tablet 40 milliGRAM(s) Oral before breakfast  phenazopyridine 200 milliGRAM(s) Oral three times a day    MEDICATIONS  (PRN):  acetaminophen   Tablet .. 650 milliGRAM(s) Oral every 6 hours PRN Temp greater or equal to 38C (100.4F), Mild Pain (1 - 3)  acetaminophen   Tablet .. 1000 milliGRAM(s) Oral every 6 hours PRN Moderate Pain (4 - 6)  traMADol 25 milliGRAM(s) Oral three times a day PRN Severe Pain (7 - 10)      Allergies    No Known Allergies    Intolerances            FAMILY HISTORY:  FH: type 2 diabetes: daughter      Vital Signs Last 24 Hrs  T(C): 36.7 (06 Oct 2019 08:01), Max: 37.1 (06 Oct 2019 00:41)  T(F): 98 (06 Oct 2019 08:01), Max: 98.8 (06 Oct 2019 00:41)  HR: 70 (06 Oct 2019 08:01) (56 - 70)  BP: 126/79 (06 Oct 2019 08:01) (125/86 - 156/74)  BP(mean): --  RR: 19 (06 Oct 2019 08:01) (18 - 19)  SpO2: 95% (06 Oct 2019 08:01) (95% - 97%)    PHYSICAL EXAM:    Constitutional: NAD, well-developed  Abd: BS+, soft, NT/ND, minimal Lt CVAT  Extremities: No peripheral edema      LABS:                        12.4   12.31 )-----------( 156      ( 06 Oct 2019 07:26 )             37.0     10-06    140  |  104  |  7   ----------------------------<  125<H>  3.6   |  28  |  0.65    Ca    9.3      06 Oct 2019 07:26  Mg     1.9     10-06          Urine Culture:   Hemoglobin: 12.4 g/dL (10-06 @ 07:26)  Hematocrit: 37.0 % (10-06 @ 07:26)  Hemoglobin: 12.2 g/dL (10-05 @ 06:33)  Hematocrit: 36.4 % (10-05 @ 06:33)      RADIOLOGY & ADDITIONAL STUDIES: [FreeTextEntry1] : 33F h/o obesity (BMI 32), hyperthyroidism s/o MMI,  with preeclampsia in  was on labetalol and nifedipine, previously followed by Dr. Roger in  since switched to hydralazine 50mg BID, metoprolol 25mg and Lasix 20mg PRN since discontinued, recent visit with PCP told with abnormal EKG and intermittent dyspnea, refer for cardiology evaluation.   PCP Dr. Khanh Casper [phone]  Patient presents with her toddler daughter for the visit  Reports dyspnea randomly at rest, denies chest pain, no routine exercise, has loss about 13 lbs on semaglutide. Only taking metoprolol with home -140 and DBP 80s.   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  Undermining was performed circumfirentially around the surgical defect.  A purse string suture was then placed and tightened. Wound tension only allowed a partial closure of the circular defect.

## 2024-08-08 NOTE — REVIEW OF SYSTEMS
Medical Necessity Information: LCD Guidelines vary from payer to payer. Please check with your payer's policy to determine medical necessity. Many payers require at least 1 Class A indication, 2 Class B indications or 1 Class B and 2 Class C to qualify for insurance payment. Medical Necessity Clause: This procedure was medically necessary due to being a painful and enlarging lesion that has required multiple treatments and has not improved. Paring Method: 11 blade scalpel [SOB] : shortness of breath [Negative] : Heme/Lymph

## 2024-08-08 NOTE — CARDIOLOGY SUMMARY
[de-identified] : 8/8/24- Sinus 80, normal axis, no ST changes, QTc 415 [de-identified] : 2/18/22- LV EF 66%

## 2024-08-09 NOTE — PHYSICAL EXAM
[No Acute Distress] : no acute distress [Normal S1, S2] : normal s1, s2 [No Resp Distress] : no resp distress [Clear to Auscultation Bilaterally] : clear to auscultation bilaterally [[id number]]

## 2024-08-11 LAB — CYTOLOGY CVX/VAG DOC THIN PREP: NORMAL

## 2024-08-29 NOTE — DISCHARGE NOTE OB - LAUNCH MEDICATION RECONCILIATION
Have pt come in on Monday?  
Patient was told to call she wasn't feeling any better. Please call patient. Thanks  
Pt is currently admitted since 8/22  
<<-----Click here for Discharge Medication Review

## 2024-08-30 ENCOUNTER — OUTPATIENT (OUTPATIENT)
Dept: OUTPATIENT SERVICES | Facility: HOSPITAL | Age: 34
LOS: 1 days | End: 2024-08-30

## 2024-08-30 ENCOUNTER — APPOINTMENT (OUTPATIENT)
Dept: CARDIOLOGY | Facility: CLINIC | Age: 34
End: 2024-08-30

## 2024-08-30 DIAGNOSIS — Z98.89 OTHER SPECIFIED POSTPROCEDURAL STATES: Chronic | ICD-10-CM

## 2024-08-30 DIAGNOSIS — Z98.890 OTHER SPECIFIED POSTPROCEDURAL STATES: Chronic | ICD-10-CM

## 2024-08-30 DIAGNOSIS — Z00.8 ENCOUNTER FOR OTHER GENERAL EXAMINATION: ICD-10-CM

## 2024-09-03 ENCOUNTER — NON-APPOINTMENT (OUTPATIENT)
Age: 34
End: 2024-09-03

## 2024-09-04 ENCOUNTER — NON-APPOINTMENT (OUTPATIENT)
Age: 34
End: 2024-09-04

## 2024-09-09 NOTE — OB RN PATIENT PROFILE - AS HEIGHT TYPE
The patient is Stable - Low risk of patient condition declining or worsening    Shift Goals  Clinical Goals: monitor O2 status  Patient Goals: go home  Family Goals: updates    Progress made toward(s) clinical / shift goals:    Problem: Knowledge Deficit - Standard  Goal: Patient and family/care givers will demonstrate understanding of plan of care, disease process/condition, diagnostic tests and medications  Outcome: Not Progressing     Problem: Knowledge Deficit - COPD  Goal: Patient/significant other demonstrates understanding of disease process, utilization of the Action Plan, medications and discharge instruction  Outcome: Not Progressing     Problem: Risk for Infection - COPD  Goal: Patient will remain free from signs and symptoms of infection  Outcome: Not Progressing     Problem: Nutrition - Advanced  Goal: Patient will display progressive weight gain toward goal have adequate food and fluid intake  Outcome: Not Progressing     Problem: Ineffective Airway Clearance  Goal: Patient will maintain patent airway with clear/clearing breath sounds  Outcome: Not Progressing     Problem: Impaired Gas Exchange  Goal: Patient will demonstrate improved ventilation and adequate oxygenation and participate in treatment regimen within the level of ability/situation.  Outcome: Not Progressing     Problem: Self Care  Goal: Patient will have the ability to perform ADLs independently or with assistance (bathe, groom, dress, toilet and feed)  Outcome: Not Progressing       Patient is not progressing towards the following goals:      Problem: Knowledge Deficit - Standard  Goal: Patient and family/care givers will demonstrate understanding of plan of care, disease process/condition, diagnostic tests and medications  Outcome: Not Progressing     Problem: Knowledge Deficit - COPD  Goal: Patient/significant other demonstrates understanding of disease process, utilization of the Action Plan, medications and discharge  instruction  Outcome: Not Progressing     Problem: Risk for Infection - COPD  Goal: Patient will remain free from signs and symptoms of infection  Outcome: Not Progressing     Problem: Nutrition - Advanced  Goal: Patient will display progressive weight gain toward goal have adequate food and fluid intake  Outcome: Not Progressing     Problem: Ineffective Airway Clearance  Goal: Patient will maintain patent airway with clear/clearing breath sounds  Outcome: Not Progressing     Problem: Impaired Gas Exchange  Goal: Patient will demonstrate improved ventilation and adequate oxygenation and participate in treatment regimen within the level of ability/situation.  Outcome: Not Progressing     Problem: Self Care  Goal: Patient will have the ability to perform ADLs independently or with assistance (bathe, groom, dress, toilet and feed)  Outcome: Not Progressing      stated

## 2024-09-17 ENCOUNTER — RESULT REVIEW (OUTPATIENT)
Age: 34
End: 2024-09-17

## 2024-09-17 ENCOUNTER — APPOINTMENT (OUTPATIENT)
Dept: ULTRASOUND IMAGING | Facility: IMAGING CENTER | Age: 34
End: 2024-09-17
Payer: COMMERCIAL

## 2024-09-17 ENCOUNTER — OUTPATIENT (OUTPATIENT)
Dept: OUTPATIENT SERVICES | Facility: HOSPITAL | Age: 34
LOS: 1 days | End: 2024-09-17
Payer: COMMERCIAL

## 2024-09-17 DIAGNOSIS — Z98.89 OTHER SPECIFIED POSTPROCEDURAL STATES: Chronic | ICD-10-CM

## 2024-09-17 DIAGNOSIS — Z98.890 OTHER SPECIFIED POSTPROCEDURAL STATES: Chronic | ICD-10-CM

## 2024-09-17 PROCEDURE — 77065 DX MAMMO INCL CAD UNI: CPT | Mod: 26,RT

## 2024-09-17 PROCEDURE — 88305 TISSUE EXAM BY PATHOLOGIST: CPT | Mod: 26

## 2024-09-17 PROCEDURE — 88305 TISSUE EXAM BY PATHOLOGIST: CPT

## 2024-09-17 PROCEDURE — 19084 BX BREAST ADD LESION US IMAG: CPT | Mod: RT

## 2024-09-17 PROCEDURE — 19084 BX BREAST ADD LESION US IMAG: CPT

## 2024-09-17 PROCEDURE — 77065 DX MAMMO INCL CAD UNI: CPT | Mod: 26,LT

## 2024-09-17 PROCEDURE — A4648: CPT

## 2024-09-17 PROCEDURE — 19083 BX BREAST 1ST LESION US IMAG: CPT | Mod: LT

## 2024-09-17 PROCEDURE — 19083 BX BREAST 1ST LESION US IMAG: CPT

## 2024-09-17 PROCEDURE — 77065 DX MAMMO INCL CAD UNI: CPT
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
## 2025-01-08 ENCOUNTER — APPOINTMENT (OUTPATIENT)
Dept: ULTRASOUND IMAGING | Facility: CLINIC | Age: 35
End: 2025-01-08
Payer: COMMERCIAL

## 2025-01-08 PROCEDURE — 76775 US EXAM ABDO BACK WALL LIM: CPT

## 2025-01-08 PROCEDURE — 76856 US EXAM PELVIC COMPLETE: CPT

## 2025-01-08 PROCEDURE — 76830 TRANSVAGINAL US NON-OB: CPT

## 2025-01-14 ENCOUNTER — APPOINTMENT (OUTPATIENT)
Dept: UROLOGY | Facility: CLINIC | Age: 35
End: 2025-01-14
Payer: COMMERCIAL

## 2025-01-14 DIAGNOSIS — N28.1 CYST OF KIDNEY, ACQUIRED: ICD-10-CM

## 2025-01-14 DIAGNOSIS — R31.21 ASYMPTOMATIC MICROSCOPIC HEMATURIA: ICD-10-CM

## 2025-01-14 PROCEDURE — 99213 OFFICE O/P EST LOW 20 MIN: CPT

## 2025-01-15 ENCOUNTER — NON-APPOINTMENT (OUTPATIENT)
Age: 35
End: 2025-01-15

## 2025-01-15 DIAGNOSIS — N83.6 HEMATOSALPINX: ICD-10-CM

## 2025-03-04 NOTE — ED PROVIDER NOTE - NSICDXPASTMEDICALHX_GEN_ALL_CORE_FT
chest wall non-tender, breathing is unlabored without accessory muscle use, normal breath sounds
PAST MEDICAL HISTORY:  Asthma mild no intubations    Hypertension     Hypertension in pregnancy, preeclampsia

## 2025-04-09 NOTE — PROGRESS NOTE ADULT - PROBLEM/PLAN-5
April 11, 2025     Patient: Tracy Carrington   YOB: 2005   Date of Visit: 4/9/2025       To Whom It May Concern:    It is my medical opinion that Tracy Carrington  may return to work on 4/12/2025.           Sincerely,        No name on file    CC: No Recipients  
DISPLAY PLAN FREE TEXT

## 2025-05-12 NOTE — ED ADULT NURSE NOTE - ALCOHOL PRE SCREEN (AUDIT - C)
[facility]  SANDEEP WrightO. Ms.Ed        Patient Name:  Marissa Mayo  :  1991     CHIEF C/O:    Chief Complaint   Patient presents with    Follow-up     6mo cerumen        HISTORY OBTAINED FROM:  patient    HISTORY OF PRESENT ILLNESS:       Marissa is a 33 y.o. year old female, here today for follow up of:       Here for routine cerumen impaction removal as well as a known history of chronic sinusitis with history of sinus surgery. Has been consistently on Flonase, allegra and Singulair and doing well with sinus complaints. Had a cold last week with some right ear pain last week but this is now improved. Denies sinus infections.           Past Medical History:   Diagnosis Date    Asthma     follows with Dr Nicholson    Bronchiectasis ([facility])     Nasal polyp     Osteoporosis     Osteoporosis 2014    Premature baby     Scoliosis 3/9/2015    Seasonal allergies     TMJ (temporomandibular joint disorder)     Vitamin D deficiency     Vitamin D deficiency 2014     Past Surgical History:   Procedure Laterality Date    PFT-LAB  2014    SINUS ENDOSCOPY N/A 2021    REVISION FUNCTIONAL ENDOSCOPIC SINUS SURGERY performed by Kaiden Aguirre DO at [facility] OR    SINUS SURGERY         Current Outpatient Medications:     mupirocin (BACTROBAN) 2 % ointment, Mix with nasal saline as instructed by office and use as nasal spray 2 sprays each nostril 2 times daily for 14 days, Disp: 22 g, Rfl: 0    fluticasone-salmeterol (ADVAIR) 500-50 MCG/ACT AEPB diskus inhaler, Inhale 1 puff into the lungs in the morning and 1 puff in the evening., Disp: 60 each, Rfl: 3    hydrOXYzine pamoate (VISTARIL) 25 MG capsule, TAKE 1 CAPSULE BY MOUTH THREE TIMES DAILY AS NEEDED FOR ANXIETY, Disp: 90 capsule, Rfl: 0    fluticasone (FLONASE) 50 MCG/ACT nasal spray, 2 sprays by Nasal route daily 2 sprays each nostril once daily, Disp: 1 each, Rfl: 3    sertraline (ZOLOFT) 25 MG tablet, Take 1 tablet by mouth daily,  Statement Selected

## 2025-06-05 NOTE — ED PROVIDER NOTE - DR. NAME
Initiate Treatment: Sofdra Continue Regimen: Glycoporralate Detail Level: Simple Samples Given: Qbrexza 2.4 % towelette Prasad Marucs Continue Regimen: Elidel Initiate Treatment: ketoconazole 2 % shampoo